# Patient Record
Sex: FEMALE | Race: BLACK OR AFRICAN AMERICAN | NOT HISPANIC OR LATINO | Employment: OTHER | URBAN - NONMETROPOLITAN AREA
[De-identification: names, ages, dates, MRNs, and addresses within clinical notes are randomized per-mention and may not be internally consistent; named-entity substitution may affect disease eponyms.]

---

## 2017-01-04 ENCOUNTER — HOSPITAL ENCOUNTER (OUTPATIENT)
Dept: OTHER | Facility: HOSPITAL | Age: 42
Setting detail: RADIATION/ONCOLOGY SERIES
Discharge: HOME | End: 2017-01-20
Attending: HOSPITALIST | Admitting: HOSPITALIST

## 2017-01-11 ENCOUNTER — OFFICE VISIT (OUTPATIENT)
Dept: FAMILY MEDICINE CLINIC | Facility: CLINIC | Age: 42
End: 2017-01-11

## 2017-01-11 VITALS
TEMPERATURE: 98.6 F | HEART RATE: 84 BPM | HEIGHT: 66 IN | DIASTOLIC BLOOD PRESSURE: 64 MMHG | BODY MASS INDEX: 43.87 KG/M2 | OXYGEN SATURATION: 97 % | SYSTOLIC BLOOD PRESSURE: 101 MMHG | WEIGHT: 273 LBS

## 2017-01-11 DIAGNOSIS — J06.9 ACUTE URI: ICD-10-CM

## 2017-01-11 DIAGNOSIS — R30.0 BURNING WITH URINATION: Primary | ICD-10-CM

## 2017-01-11 DIAGNOSIS — B37.0 CANDIDA, ORAL: ICD-10-CM

## 2017-01-11 DIAGNOSIS — R11.2 NAUSEA AND VOMITING, INTRACTABILITY OF VOMITING NOT SPECIFIED, UNSPECIFIED VOMITING TYPE: ICD-10-CM

## 2017-01-11 DIAGNOSIS — B37.31 CANDIDA VAGINITIS: ICD-10-CM

## 2017-01-11 LAB
BILIRUB BLD-MCNC: NORMAL MG/DL
CLARITY, POC: NORMAL
COLOR UR: NORMAL
GLUCOSE UR STRIP-MCNC: NEGATIVE MG/DL
KETONES UR QL: NORMAL
LEUKOCYTE EST, POC: NORMAL
NITRITE UR-MCNC: NEGATIVE MG/ML
PH UR: 6 [PH] (ref 5–8)
PROT UR STRIP-MCNC: NORMAL MG/DL
RBC # UR STRIP: NEGATIVE /UL
SP GR UR: 1.02 (ref 1–1.03)
UROBILINOGEN UR QL: NORMAL

## 2017-01-11 PROCEDURE — 81002 URINALYSIS NONAUTO W/O SCOPE: CPT | Performed by: NURSE PRACTITIONER

## 2017-01-11 PROCEDURE — 99214 OFFICE O/P EST MOD 30 MIN: CPT | Performed by: NURSE PRACTITIONER

## 2017-01-11 RX ORDER — ERGOCALCIFEROL 1.25 MG/1
50000 CAPSULE ORAL WEEKLY
COMMUNITY
End: 2019-02-25

## 2017-01-11 RX ORDER — CHOLECALCIFEROL (VITAMIN D3) 125 MCG
5 CAPSULE ORAL
COMMUNITY

## 2017-01-11 RX ORDER — AZITHROMYCIN 250 MG/1
TABLET, FILM COATED ORAL
Qty: 6 TABLET | Refills: 0 | Status: SHIPPED | OUTPATIENT
Start: 2017-01-11 | End: 2017-11-30

## 2017-01-11 RX ORDER — PROMETHAZINE HYDROCHLORIDE 25 MG/1
25 TABLET ORAL EVERY 6 HOURS PRN
Qty: 10 TABLET | Refills: 0 | Status: SHIPPED | OUTPATIENT
Start: 2017-01-11 | End: 2019-02-25

## 2017-01-11 RX ORDER — FLUCONAZOLE 150 MG/1
150 TABLET ORAL DAILY
Qty: 2 TABLET | Refills: 0 | Status: SHIPPED | OUTPATIENT
Start: 2017-01-11 | End: 2017-01-13

## 2017-01-11 NOTE — MR AVS SNAPSHOT
Jenn Good   1/11/2017 11:00 AM   Office Visit    Dept Phone:  [phone]   Encounter #:  [id number]    Provider:  JASON Vega   Department:  [facility] FAMILY [facility]IC[facility] [place]                Your Full Care Plan              Today's Medication Changes          These changes are accurate as of: 1/11/17 11:40 AM.  If you have any questions, ask your nurse or doctor.               New Medication(s)Ordered:     azithromycin 250 MG tablet   Commonly known as:  ZITHROMAX Z-TYSON   Take 2 tablets the first day, then 1 tablet daily for 4 days.   Started by:  JASON Vega       fluconazole 150 MG tablet   Commonly known as:  DIFLUCAN   Take 1 tablet by mouth Daily for 2 doses. One tab now repeat in 5 days   Started by:  JASON Vega       nystatin [id number] UNIT/ML suspension   Commonly known as:  MYCOSTATIN   Swish and swallow 5 mL 4 (Four) Times a Day for 7 days. Can swish and spit   Started by:  JASON Vega       promethazine 25 MG tablet   Commonly known as:  PHENERGAN   Take 1 tablet by mouth Every 6 (Six) Hours As Needed for nausea or vomiting.   Started by:  JASON Vega       terconazole 0.8 % vaginal cream   Commonly known as:  TERAZOL 3   Insert 1 applicator into the vagina Every Night for 3 days.   Started by:  JASON Vega            Where to Get Your Medications      These medications were sent to [facility] [address] [place] [address] [phone] [facility] [phone] [address] [address]     Phone:  [phone]     azithromycin 250 MG tablet    fluconazole 150 MG tablet    nystatin [id number] UNIT/ML suspension    promethazine 25 MG tablet    terconazole 0.8 % vaginal cream                  Your Updated Medication List          This list is accurate as of: 1/11/17 11:40 AM.  Always use your most recent med list.                amphetamine-dextroamphetamine XR  10 MG 24 hr capsule   Commonly known as:  ADDERALL XR       azithromycin 250 MG tablet   Commonly known as:  ZITHROMAX Z-TYSON   Take 2 tablets the first day, then 1 tablet daily for 4 days.       * baclofen 10 MG tablet   Commonly known as:  LIORESAL       * baclofen 20 MG tablet   Commonly known as:  LIORESAL       clotrimazole 1 % external solution   Commonly known as:  LOTRIMIN   Apply  topically 2 (Two) Times a Day.       diclofenac 75 MG EC tablet   Commonly known as:  VOLTAREN   Take 1 tablet by mouth 2 (Two) Times a Day.       diltiaZEM  MG 24 hr capsule   Commonly known as:  CARTIA XT   Take 1 capsule by mouth Daily.       doxepin 50 MG capsule   Commonly known as:  SINEquan       estradiol 2 MG tablet   Commonly known as:  ESTRACE   Take 1 tablet by mouth Daily.       fluconazole 150 MG tablet   Commonly known as:  DIFLUCAN   Take 1 tablet by mouth Daily for 2 doses. One tab now repeat in 5 days       melatonin 5 MG tablet tablet       miconazole 2 % vaginal cream   Commonly known as:  MICOTIN   Insert 1 applicator into the vagina Every Night.       natalizumab 300 MG/15ML injection   Commonly known as:  TYSABRI       nystatin [id number] UNIT/ML suspension   Commonly known as:  MYCOSTATIN   Swish and swallow 5 mL 4 (Four) Times a Day for 7 days. Can swish and spit       omeprazole 20 MG capsule   Commonly known as:  PRILOSEC   Take 1 capsule by mouth Daily.       pregabalin 100 MG capsule   Commonly known as:  LYRICA       promethazine 25 MG tablet   Commonly known as:  PHENERGAN   Take 1 tablet by mouth Every 6 (Six) Hours As Needed for nausea or vomiting.       SUMAtriptan 50 MG tablet   Commonly known as:  IMITREX       terconazole 0.8 % vaginal cream   Commonly known as:  TERAZOL 3   Insert 1 applicator into the vagina Every Night for 3 days.       tiZANidine 4 MG tablet   Commonly known as:  ZANAFLEX       traZODone 150 MG tablet   Commonly known as:  DESYREL   Take 1 tablet by mouth Every Night.        venlafaxine  MG 24 hr capsule   Commonly known as:  EFFEXOR-XR       vitamin D 18882 UNITS capsule capsule   Commonly known as:  ERGOCALCIFEROL       * Notice:  This list has 2 medication(s) that are the same as other medications prescribed for you. Read the directions carefully, and ask your doctor or other care provider to review them with you.            We Performed the Following     POC Urinalysis Dipstick     Urinalysis, Microscopic Only       You Were Diagnosed With        Codes Comments    Burning with urination    -  Primary ICD-10-CM: R30.0  ICD-9-CM: 788.1     Candida, oral     ICD-10-CM: B37.0  ICD-9-CM: 112.0     Candida vaginitis     ICD-10-CM: B37.3  ICD-9-CM: 112.1     Nausea and vomiting, intractability of vomiting not specified, unspecified vomiting type     ICD-10-CM: R11.2  ICD-9-CM: 787.01     Acute URI     ICD-10-CM: J06.9  ICD-9-CM: 465.9       Instructions     None    Patient Instructions History      Upcoming Appointments     Visit Type Date Time Department    SAME DAY 1/11/2017 11:00 AM [facility]    OFFICE VISIT 3/20/2017 10:40 AM [facility]      MyChart Signup     Our records indicate that you have declined [employer] signup. If you would like to sign up for [employer], please email [email or web address] or call [phone] to obtain an activation code.             Other Info from Your Visit           Your Appointments     Mar 20, 2017 10:40 AM CDT   Office Visit with JASON Curry   [facility] (--)    [address] [place] [address]  [facility] [address] [phone]           Arrive 15 minutes prior to appointment.              Allergies     Lisinopril  Cough      Reason for Visit     Urinary Tract Infection     Vomiting     Sinusitis     Headache           Vital Signs     Blood Pressure Pulse Temperature Height Weight Oxygen Saturation  "   101/64 84 98.6 °F (37 °C) 66\" (167.6 cm) 273 lb (124 kg) 97%    Body Mass Index Smoking Status                44.06 kg/m2 Never Smoker          Problems and Diagnoses Noted     Burning with urination    -  Primary    Candida, oral        Vaginal yeast infection        Nausea and vomiting        Acute upper respiratory infection            "

## 2017-01-11 NOTE — PROGRESS NOTES
Subjective   Jenn Good is a 41 y.o. female.     HPI Comments: She is here with sore mouth, N/V for over a week, sinus congestion and vag discomfort, feels like she might have a UTI.  Has had to take rounds of steroids for exacerbation of MS, still in process.      Urinary Tract Infection    Associated symptoms include nausea and vomiting.   Vomiting    This is a new problem. The current episode started 1 to 4 weeks ago. The problem occurs intermittently. The problem has been unchanged. The emesis has an appearance of bile and stomach contents. There has been no fever. Associated symptoms include abdominal pain, dizziness and headaches. Pertinent negatives include no chest pain or diarrhea. She has tried nothing for the symptoms. The treatment provided no relief.   Sinusitis   This is a new problem. The current episode started in the past 7 days. The problem has been gradually worsening since onset. There has been no fever. Her pain is at a severity of 0/10. The pain is mild. Associated symptoms include congestion, headaches, sinus pressure and a sore throat. Pertinent negatives include no neck pain or shortness of breath. (Mouth is sore with white patches) Past treatments include nothing. The treatment provided no relief.   Headache    Associated symptoms include abdominal pain, dizziness, nausea, sinus pressure, a sore throat and vomiting. Pertinent negatives include no neck pain.        The following portions of the patient's history were reviewed and updated as appropriate: allergies, current medications, past family history, past medical history, past social history, past surgical history and problem list.    Review of Systems   Constitutional: Positive for appetite change and fatigue.   HENT: Positive for congestion, mouth sores, postnasal drip, sinus pressure and sore throat.         Mouth is sore with white patches   Respiratory: Negative for chest tightness and shortness of breath.    Cardiovascular:  Negative for chest pain and palpitations.   Gastrointestinal: Positive for abdominal pain, nausea and vomiting. Negative for diarrhea.   Genitourinary: Positive for decreased urine volume and difficulty urinating.        Vaginal itching   Musculoskeletal: Negative for neck pain.   Skin: Negative.    Neurological: Positive for dizziness and headaches.       Objective   Physical Exam   Constitutional: She is oriented to person, place, and time. She appears well-developed and well-nourished.   HENT:   Head: Normocephalic and atraumatic.   Right Ear: Hearing, tympanic membrane, external ear and ear canal normal.   Left Ear: Hearing, tympanic membrane, external ear and ear canal normal.   Nose: Mucosal edema and rhinorrhea present. Right sinus exhibits maxillary sinus tenderness and frontal sinus tenderness. Left sinus exhibits maxillary sinus tenderness and frontal sinus tenderness.   Mouth/Throat: Uvula is midline. Mucous membranes are dry. Oropharyngeal exudate and posterior oropharyngeal erythema present.   Oral mucosa is very dry with white stuck on exudate tongue and throat   Eyes: Conjunctivae are normal.   Neck: Normal range of motion. Neck supple.   Cardiovascular: Normal rate and regular rhythm.    Pulmonary/Chest: Effort normal and breath sounds normal.   Abdominal: Soft. There is tenderness. There is no guarding.   Epigastric tenderness,    Genitourinary: Vaginal discharge found.   Genitourinary Comments: States noticed vag itching and a white discharge   Musculoskeletal: Normal range of motion.   Neurological: She is alert and oriented to person, place, and time.   Skin: Skin is warm and dry.   Psychiatric: She has a normal mood and affect.   Nursing note and vitals reviewed.      Assessment/Plan   Jenn was seen today for urinary tract infection, vomiting, sinusitis and headache.    Diagnoses and all orders for this visit:    Burning with urination  -     POC Urinalysis Dipstick  -     Urinalysis,  Microscopic Only    Candida, oral  -     nystatin (MYCOSTATIN) [id number] UNIT/ML suspension; Swish and swallow 5 mL 4 (Four) Times a Day for 7 days. Can swish and spit    Candida vaginitis  -     fluconazole (DIFLUCAN) 150 MG tablet; Take 1 tablet by mouth Daily for 2 doses. One tab now repeat in 5 days  -     terconazole (TERAZOL 3) 0.8 % vaginal cream; Insert 1 applicator into the vagina Every Night for 3 days.    Nausea and vomiting, intractability of vomiting not specified, unspecified vomiting type  -     promethazine (PHENERGAN) 25 MG tablet; Take 1 tablet by mouth Every 6 (Six) Hours As Needed for nausea or vomiting.    Acute URI  -     azithromycin (ZITHROMAX Z-TYSON) 250 MG tablet; Take 2 tablets the first day, then 1 tablet daily for 4 days.

## 2017-01-12 LAB
CONV BACTERIA IN URINE MICRO: ABNORMAL /HPF
CRYSTALS FLD MICRO: ABNORMAL /HPF
MUCOUS THREADS URNS QL MICRO: ABNORMAL
RBC #/AREA URNS HPF: ABNORMAL /HPF
SQUAMOUS SPT QL MICRO: ABNORMAL /HPF
WBC #/AREA URNS HPF: ABNORMAL /HPF

## 2017-02-11 PROBLEM — G35 MULTIPLE SCLEROSIS (HCC): Status: ACTIVE | Noted: 2017-02-11

## 2017-02-11 RX ORDER — ACETAMINOPHEN 500 MG
1000 TABLET ORAL AS NEEDED
Status: CANCELLED | OUTPATIENT
Start: 2017-02-13

## 2017-02-11 RX ORDER — ONDANSETRON 2 MG/ML
4 INJECTION INTRAMUSCULAR; INTRAVENOUS AS NEEDED
Status: CANCELLED
Start: 2017-02-13

## 2017-02-11 RX ORDER — SODIUM CHLORIDE 9 MG/ML
250 INJECTION, SOLUTION INTRAVENOUS ONCE
Status: CANCELLED | OUTPATIENT
Start: 2017-02-13

## 2017-02-11 RX ORDER — DIPHENHYDRAMINE HYDROCHLORIDE 50 MG/ML
25 INJECTION INTRAMUSCULAR; INTRAVENOUS AS NEEDED
Status: CANCELLED
Start: 2017-02-13

## 2017-02-13 ENCOUNTER — INFUSION (OUTPATIENT)
Dept: ONCOLOGY | Facility: HOSPITAL | Age: 42
End: 2017-02-13

## 2017-02-13 VITALS
SYSTOLIC BLOOD PRESSURE: 107 MMHG | RESPIRATION RATE: 20 BRPM | OXYGEN SATURATION: 99 % | TEMPERATURE: 97.1 F | DIASTOLIC BLOOD PRESSURE: 65 MMHG | HEART RATE: 73 BPM

## 2017-02-13 DIAGNOSIS — G35 MULTIPLE SCLEROSIS (HCC): Primary | ICD-10-CM

## 2017-02-13 LAB
ALBUMIN SERPL-MCNC: 3.5 G/DL (ref 3.4–4.8)
ALP SERPL-CCNC: 83 U/L (ref 38–126)
ALT SERPL W P-5'-P-CCNC: 16 U/L (ref 9–52)
AST SERPL-CCNC: 18 U/L (ref 14–36)
BILIRUB CONJ SERPL-MCNC: 0 MG/DL (ref 0–0.3)
BILIRUB INDIRECT SERPL-MCNC: 0 MG/DL (ref 0–1.1)
BILIRUB SERPL-MCNC: 0.3 MG/DL (ref 0.2–1.3)
PROT SERPL-MCNC: 6.1 G/DL (ref 6.3–8.6)

## 2017-02-13 PROCEDURE — 25010000002 NATALIZUMAB PER 1 MG: Performed by: PSYCHIATRY & NEUROLOGY

## 2017-02-13 PROCEDURE — 96413 CHEMO IV INFUSION 1 HR: CPT | Performed by: HOSPITALIST

## 2017-02-13 PROCEDURE — 36415 COLL VENOUS BLD VENIPUNCTURE: CPT | Performed by: HOSPITALIST

## 2017-02-13 RX ORDER — SODIUM CHLORIDE 9 MG/ML
250 INJECTION, SOLUTION INTRAVENOUS ONCE
Status: CANCELLED | OUTPATIENT
Start: 2017-03-13

## 2017-02-13 RX ORDER — DIPHENHYDRAMINE HYDROCHLORIDE 50 MG/ML
25 INJECTION INTRAMUSCULAR; INTRAVENOUS AS NEEDED
Status: CANCELLED
Start: 2017-02-13

## 2017-02-13 RX ORDER — DIPHENHYDRAMINE HYDROCHLORIDE 50 MG/ML
25 INJECTION INTRAMUSCULAR; INTRAVENOUS AS NEEDED
Start: 2018-02-01

## 2017-02-13 RX ORDER — ONDANSETRON 2 MG/ML
4 INJECTION INTRAMUSCULAR; INTRAVENOUS AS NEEDED
Status: CANCELLED
Start: 2017-02-13

## 2017-02-13 RX ORDER — ACETAMINOPHEN 500 MG
1000 TABLET ORAL AS NEEDED
OUTPATIENT
Start: 2018-02-01

## 2017-02-13 RX ORDER — ACETAMINOPHEN 500 MG
1000 TABLET ORAL AS NEEDED
Status: CANCELLED | OUTPATIENT
Start: 2017-02-13

## 2017-02-13 RX ORDER — ONDANSETRON 2 MG/ML
4 INJECTION INTRAMUSCULAR; INTRAVENOUS AS NEEDED
Start: 2018-02-01

## 2017-02-13 RX ORDER — SODIUM CHLORIDE 0.9 % (FLUSH) 0.9 %
SYRINGE (ML) INJECTION
Status: COMPLETED
Start: 2017-02-13 | End: 2017-02-13

## 2017-02-13 RX ORDER — SODIUM CHLORIDE 9 MG/ML
250 INJECTION, SOLUTION INTRAVENOUS ONCE
Status: COMPLETED | OUTPATIENT
Start: 2017-02-13 | End: 2017-02-13

## 2017-02-13 RX ADMIN — Medication 10 ML: at 09:30

## 2017-02-13 RX ADMIN — NATALIZUMAB 300 MG: 300 INJECTION INTRAVENOUS at 10:07

## 2017-02-13 RX ADMIN — SODIUM CHLORIDE 250 ML: 9 INJECTION, SOLUTION INTRAVENOUS at 09:38

## 2017-03-13 ENCOUNTER — INFUSION (OUTPATIENT)
Dept: ONCOLOGY | Facility: HOSPITAL | Age: 42
End: 2017-03-13

## 2017-03-13 VITALS
DIASTOLIC BLOOD PRESSURE: 89 MMHG | TEMPERATURE: 97.2 F | SYSTOLIC BLOOD PRESSURE: 155 MMHG | RESPIRATION RATE: 18 BRPM | HEART RATE: 86 BPM

## 2017-03-13 DIAGNOSIS — G35 MULTIPLE SCLEROSIS (HCC): Primary | ICD-10-CM

## 2017-03-13 PROCEDURE — 25010000002 NATALIZUMAB PER 1 MG: Performed by: PSYCHIATRY & NEUROLOGY

## 2017-03-13 PROCEDURE — 96413 CHEMO IV INFUSION 1 HR: CPT | Performed by: HOSPITALIST

## 2017-03-13 RX ORDER — ONDANSETRON 2 MG/ML
4 INJECTION INTRAMUSCULAR; INTRAVENOUS AS NEEDED
Status: CANCELLED
Start: 2017-04-10

## 2017-03-13 RX ORDER — SODIUM CHLORIDE 9 MG/ML
250 INJECTION, SOLUTION INTRAVENOUS ONCE
Status: CANCELLED | OUTPATIENT
Start: 2017-04-10

## 2017-03-13 RX ORDER — SODIUM CHLORIDE 9 MG/ML
250 INJECTION, SOLUTION INTRAVENOUS ONCE
Status: COMPLETED | OUTPATIENT
Start: 2017-03-13 | End: 2017-03-13

## 2017-03-13 RX ORDER — ACETAMINOPHEN 500 MG
1000 TABLET ORAL AS NEEDED
Status: CANCELLED
Start: 2017-04-10

## 2017-03-13 RX ORDER — DIPHENHYDRAMINE HYDROCHLORIDE 50 MG/ML
25 INJECTION INTRAMUSCULAR; INTRAVENOUS ONCE
Status: CANCELLED
Start: 2017-04-10 | End: 2017-04-10

## 2017-03-13 RX ADMIN — SODIUM CHLORIDE 250 ML: 9 INJECTION, SOLUTION INTRAVENOUS at 09:28

## 2017-03-13 RX ADMIN — NATALIZUMAB 300 MG: 300 INJECTION INTRAVENOUS at 09:45

## 2017-03-31 ENCOUNTER — OFFICE VISIT (OUTPATIENT)
Dept: FAMILY MEDICINE CLINIC | Facility: CLINIC | Age: 42
End: 2017-03-31

## 2017-03-31 VITALS
HEIGHT: 66 IN | OXYGEN SATURATION: 97 % | TEMPERATURE: 98 F | RESPIRATION RATE: 16 BRPM | WEIGHT: 262.4 LBS | HEART RATE: 79 BPM | DIASTOLIC BLOOD PRESSURE: 90 MMHG | SYSTOLIC BLOOD PRESSURE: 144 MMHG | BODY MASS INDEX: 42.17 KG/M2

## 2017-03-31 DIAGNOSIS — M54.41 LOW BACK PAIN WITH RIGHT-SIDED SCIATICA, UNSPECIFIED BACK PAIN LATERALITY, UNSPECIFIED CHRONICITY: Primary | ICD-10-CM

## 2017-03-31 PROCEDURE — 99213 OFFICE O/P EST LOW 20 MIN: CPT | Performed by: NURSE PRACTITIONER

## 2017-03-31 NOTE — PROGRESS NOTES
Subjective   Jenn Good is a 41 y.o. female.     HPI Comments: She cont to have back pain that runs into her right leg.  Has had physical therapy about 3 or 4 months ago and did not help.  Has seen the chiropractor and the benefits have been minimal.     Back Pain   This is a chronic problem. The current episode started more than 1 year ago. The problem occurs constantly. The problem has been waxing and waning since onset. The pain is present in the lumbar spine. The pain radiates to the right thigh. The pain is at a severity of 7/10. The pain is moderate. The pain is the same all the time. The symptoms are aggravated by standing. Stiffness is present in the morning. Associated symptoms include leg pain (right leg). Pertinent negatives include no abdominal pain, bladder incontinence, bowel incontinence, chest pain, dysuria, fever, headaches, numbness, paresis, paresthesias, pelvic pain, perianal numbness, tingling, weakness or weight loss. Risk factors include obesity. She has tried chiropractic manipulation and muscle relaxant for the symptoms. The treatment provided mild relief.        The following portions of the patient's history were reviewed and updated as appropriate: allergies, current medications, past family history, past medical history, past social history, past surgical history and problem list.    Review of Systems   Constitutional: Negative.  Negative for fever and weight loss.   HENT: Negative.    Respiratory: Negative.    Cardiovascular: Negative.  Negative for chest pain.   Gastrointestinal: Negative for abdominal pain and bowel incontinence.   Genitourinary: Negative for bladder incontinence, dysuria and pelvic pain.   Musculoskeletal: Positive for back pain.   Neurological: Negative.  Negative for tingling, weakness, numbness, headaches and paresthesias.   Psychiatric/Behavioral: Negative.        Objective   Physical Exam   Constitutional: She is oriented to person, place, and time. She  appears well-developed and well-nourished. No distress.   HENT:   Head: Normocephalic.   Eyes: Pupils are equal, round, and reactive to light.   Neck: Normal range of motion. Neck supple. No thyromegaly present.   Cardiovascular: Normal rate, regular rhythm and normal heart sounds.  Exam reveals no friction rub.    No murmur heard.  Pulmonary/Chest: Effort normal and breath sounds normal. No respiratory distress. She has no wheezes. She has no rales.   Abdominal: Soft.   Musculoskeletal:        Lumbar back: She exhibits decreased range of motion, pain and spasm.   Prev xray of lumbar spine is reviewed.  Minimal disc space narrowing is noted.   Neurological: She is alert and oriented to person, place, and time.   Skin: Skin is warm and dry.   Psychiatric: She has a normal mood and affect. Thought content normal.   Nursing note and vitals reviewed.      Assessment/Plan   Jenn was seen today for follow-up.    Diagnoses and all orders for this visit:    Low back pain with right-sided sciatica, unspecified back pain laterality, unspecified chronicity      She has had physical therapy and has not seen significant improvement.  Will try to get mri approved.  No other changes in meds at this time.

## 2017-04-10 ENCOUNTER — APPOINTMENT (OUTPATIENT)
Dept: ONCOLOGY | Facility: HOSPITAL | Age: 42
End: 2017-04-10

## 2017-04-11 ENCOUNTER — INFUSION (OUTPATIENT)
Dept: ONCOLOGY | Facility: HOSPITAL | Age: 42
End: 2017-04-11

## 2017-04-11 VITALS
RESPIRATION RATE: 16 BRPM | DIASTOLIC BLOOD PRESSURE: 64 MMHG | WEIGHT: 261.7 LBS | SYSTOLIC BLOOD PRESSURE: 128 MMHG | TEMPERATURE: 98 F | BODY MASS INDEX: 42.24 KG/M2 | HEART RATE: 84 BPM

## 2017-04-11 DIAGNOSIS — E67.3 HYPERVITAMINOSIS D: ICD-10-CM

## 2017-04-11 DIAGNOSIS — R21 RASH: ICD-10-CM

## 2017-04-11 DIAGNOSIS — K21.9 GASTROESOPHAGEAL REFLUX DISEASE WITHOUT ESOPHAGITIS: ICD-10-CM

## 2017-04-11 DIAGNOSIS — G35 MULTIPLE SCLEROSIS (HCC): Primary | ICD-10-CM

## 2017-04-11 DIAGNOSIS — Z79.899 DRUG THERAPY: ICD-10-CM

## 2017-04-11 DIAGNOSIS — Z79.890 HORMONE REPLACEMENT THERAPY: ICD-10-CM

## 2017-04-11 DIAGNOSIS — I10 ESSENTIAL HYPERTENSION: ICD-10-CM

## 2017-04-11 LAB
25(OH)D3 SERPL-MCNC: 55.2 NG/ML (ref 30–100)
ALBUMIN SERPL-MCNC: 3.6 G/DL (ref 3.4–4.8)
ALP SERPL-CCNC: 126 U/L (ref 38–126)
ALT SERPL W P-5'-P-CCNC: 23 U/L (ref 9–52)
AST SERPL-CCNC: 27 U/L (ref 14–36)
BASOPHILS # BLD AUTO: 0.04 10*3/MM3 (ref 0–0.2)
BASOPHILS NFR BLD AUTO: 0.4 % (ref 0–2)
BILIRUB CONJ SERPL-MCNC: 0 MG/DL (ref 0–0.3)
BILIRUB INDIRECT SERPL-MCNC: 0 MG/DL (ref 0–1.1)
BILIRUB SERPL-MCNC: 0.4 MG/DL (ref 0.2–1.3)
DEPRECATED RDW RBC AUTO: 46.2 FL (ref 36.4–46.3)
EOSINOPHIL # BLD AUTO: 0.24 10*3/MM3 (ref 0–0.7)
EOSINOPHIL NFR BLD AUTO: 2.2 % (ref 0–7)
ERYTHROCYTE [DISTWIDTH] IN BLOOD BY AUTOMATED COUNT: 16.8 % (ref 11.5–14.5)
HCT VFR BLD AUTO: 29.9 % (ref 35–45)
HGB BLD-MCNC: 9.6 G/DL (ref 12–15.5)
IMM GRANULOCYTES # BLD: 0.06 10*3/MM3 (ref 0–0.02)
IMM GRANULOCYTES NFR BLD: 0.5 % (ref 0–0.5)
LYMPHOCYTES # BLD AUTO: 3.52 10*3/MM3 (ref 0.6–4.2)
LYMPHOCYTES NFR BLD AUTO: 32.1 % (ref 10–50)
MCH RBC QN AUTO: 23.9 PG (ref 26.5–34)
MCHC RBC AUTO-ENTMCNC: 32.1 G/DL (ref 31.4–36)
MCV RBC AUTO: 74.4 FL (ref 80–98)
MONOCYTES # BLD AUTO: 0.58 10*3/MM3 (ref 0–0.9)
MONOCYTES NFR BLD AUTO: 5.3 % (ref 0–12)
NEUTROPHILS # BLD AUTO: 6.51 10*3/MM3 (ref 2–8.6)
NEUTROPHILS NFR BLD AUTO: 59.5 % (ref 37–80)
PLATELET # BLD AUTO: 295 10*3/MM3 (ref 150–450)
PMV BLD AUTO: 10 FL (ref 8–12)
PROT SERPL-MCNC: 6.2 G/DL (ref 6.3–8.6)
RBC # BLD AUTO: 4.02 10*6/MM3 (ref 3.77–5.16)
WBC NRBC COR # BLD: 10.95 10*3/MM3 (ref 3.2–9.8)

## 2017-04-11 PROCEDURE — 82306 VITAMIN D 25 HYDROXY: CPT | Performed by: HOSPITALIST

## 2017-04-11 PROCEDURE — 80076 HEPATIC FUNCTION PANEL: CPT | Performed by: HOSPITALIST

## 2017-04-11 PROCEDURE — 36415 COLL VENOUS BLD VENIPUNCTURE: CPT | Performed by: INTERNAL MEDICINE

## 2017-04-11 PROCEDURE — 96365 THER/PROPH/DIAG IV INF INIT: CPT | Performed by: INTERNAL MEDICINE

## 2017-04-11 PROCEDURE — 86711 JOHN CUNNINGHAM ANTIBODY: CPT | Performed by: HOSPITALIST

## 2017-04-11 PROCEDURE — 36415 COLL VENOUS BLD VENIPUNCTURE: CPT | Performed by: HOSPITALIST

## 2017-04-11 PROCEDURE — 25010000002 NATALIZUMAB PER 1 MG: Performed by: PSYCHIATRY & NEUROLOGY

## 2017-04-11 PROCEDURE — 85025 COMPLETE CBC W/AUTO DIFF WBC: CPT | Performed by: HOSPITALIST

## 2017-04-11 RX ORDER — DIPHENHYDRAMINE HYDROCHLORIDE 50 MG/ML
25 INJECTION INTRAMUSCULAR; INTRAVENOUS ONCE
Status: CANCELLED
Start: 2017-05-08 | End: 2017-05-08

## 2017-04-11 RX ORDER — ONDANSETRON 2 MG/ML
4 INJECTION INTRAMUSCULAR; INTRAVENOUS AS NEEDED
Status: CANCELLED
Start: 2017-05-08

## 2017-04-11 RX ORDER — SODIUM CHLORIDE 9 MG/ML
250 INJECTION, SOLUTION INTRAVENOUS ONCE
Status: COMPLETED | OUTPATIENT
Start: 2017-04-11 | End: 2017-04-11

## 2017-04-11 RX ORDER — ACETAMINOPHEN 500 MG
1000 TABLET ORAL AS NEEDED
Status: CANCELLED
Start: 2017-05-08

## 2017-04-11 RX ORDER — SODIUM CHLORIDE 9 MG/ML
250 INJECTION, SOLUTION INTRAVENOUS ONCE
Status: CANCELLED | OUTPATIENT
Start: 2017-05-08

## 2017-04-11 RX ADMIN — NATALIZUMAB 300 MG: 300 INJECTION INTRAVENOUS at 15:05

## 2017-04-11 RX ADMIN — SODIUM CHLORIDE 250 ML: 9 INJECTION, SOLUTION INTRAVENOUS at 14:35

## 2017-04-26 LAB — REF LAB TEST RESULTS: NORMAL

## 2017-05-02 ENCOUNTER — OFFICE VISIT (OUTPATIENT)
Dept: FAMILY MEDICINE CLINIC | Facility: CLINIC | Age: 42
End: 2017-05-02

## 2017-05-02 VITALS
HEIGHT: 66 IN | WEIGHT: 253.6 LBS | DIASTOLIC BLOOD PRESSURE: 70 MMHG | SYSTOLIC BLOOD PRESSURE: 128 MMHG | OXYGEN SATURATION: 97 % | HEART RATE: 91 BPM | RESPIRATION RATE: 16 BRPM | BODY MASS INDEX: 40.76 KG/M2 | TEMPERATURE: 96.5 F

## 2017-05-02 DIAGNOSIS — M54.5 CHRONIC LOW BACK PAIN, UNSPECIFIED BACK PAIN LATERALITY, WITH SCIATICA PRESENCE UNSPECIFIED: Primary | ICD-10-CM

## 2017-05-02 DIAGNOSIS — H61.21 IMPACTED CERUMEN OF RIGHT EAR: ICD-10-CM

## 2017-05-02 DIAGNOSIS — G89.29 CHRONIC LOW BACK PAIN, UNSPECIFIED BACK PAIN LATERALITY, WITH SCIATICA PRESENCE UNSPECIFIED: Primary | ICD-10-CM

## 2017-05-02 PROBLEM — M54.50 CHRONIC LOW BACK PAIN: Status: ACTIVE | Noted: 2017-05-02

## 2017-05-02 PROCEDURE — 99213 OFFICE O/P EST LOW 20 MIN: CPT | Performed by: NURSE PRACTITIONER

## 2017-05-02 PROCEDURE — 96372 THER/PROPH/DIAG INJ SC/IM: CPT | Performed by: NURSE PRACTITIONER

## 2017-05-02 RX ORDER — BETAMETHASONE SODIUM PHOSPHATE AND BETAMETHASONE ACETATE 3; 3 MG/ML; MG/ML
12 INJECTION, SUSPENSION INTRA-ARTICULAR; INTRALESIONAL; INTRAMUSCULAR; SOFT TISSUE EVERY 24 HOURS
Status: DISCONTINUED | OUTPATIENT
Start: 2017-05-02 | End: 2017-05-02

## 2017-05-02 RX ORDER — BETAMETHASONE SODIUM PHOSPHATE AND BETAMETHASONE ACETATE 3; 3 MG/ML; MG/ML
12 INJECTION, SUSPENSION INTRA-ARTICULAR; INTRALESIONAL; INTRAMUSCULAR; SOFT TISSUE EVERY 24 HOURS
Status: SHIPPED | OUTPATIENT
Start: 2017-05-02 | End: 2017-05-04

## 2017-05-02 RX ADMIN — BETAMETHASONE SODIUM PHOSPHATE AND BETAMETHASONE ACETATE 12 MG: 3; 3 INJECTION, SUSPENSION INTRA-ARTICULAR; INTRALESIONAL; INTRAMUSCULAR; SOFT TISSUE at 12:06

## 2017-05-09 ENCOUNTER — INFUSION (OUTPATIENT)
Dept: ONCOLOGY | Facility: HOSPITAL | Age: 42
End: 2017-05-09

## 2017-05-09 VITALS
RESPIRATION RATE: 18 BRPM | HEART RATE: 100 BPM | DIASTOLIC BLOOD PRESSURE: 61 MMHG | TEMPERATURE: 96.5 F | SYSTOLIC BLOOD PRESSURE: 111 MMHG

## 2017-05-09 DIAGNOSIS — G35 MULTIPLE SCLEROSIS (HCC): Primary | ICD-10-CM

## 2017-05-09 PROCEDURE — 25010000002 NATALIZUMAB PER 1 MG: Performed by: PSYCHIATRY & NEUROLOGY

## 2017-05-09 PROCEDURE — 96365 THER/PROPH/DIAG IV INF INIT: CPT | Performed by: HOSPITALIST

## 2017-05-09 RX ORDER — DIPHENHYDRAMINE HYDROCHLORIDE 50 MG/ML
25 INJECTION INTRAMUSCULAR; INTRAVENOUS ONCE
Status: CANCELLED
Start: 2017-06-06 | End: 2017-06-06

## 2017-05-09 RX ORDER — ONDANSETRON 2 MG/ML
4 INJECTION INTRAMUSCULAR; INTRAVENOUS AS NEEDED
Status: CANCELLED
Start: 2017-06-06

## 2017-05-09 RX ORDER — SODIUM CHLORIDE 9 MG/ML
250 INJECTION, SOLUTION INTRAVENOUS ONCE
Status: COMPLETED | OUTPATIENT
Start: 2017-05-09 | End: 2017-05-09

## 2017-05-09 RX ORDER — SODIUM CHLORIDE 9 MG/ML
250 INJECTION, SOLUTION INTRAVENOUS ONCE
Status: CANCELLED | OUTPATIENT
Start: 2017-06-06

## 2017-05-09 RX ORDER — ACETAMINOPHEN 500 MG
1000 TABLET ORAL AS NEEDED
Status: CANCELLED
Start: 2017-06-06

## 2017-05-09 RX ADMIN — SODIUM CHLORIDE 250 ML: 9 INJECTION, SOLUTION INTRAVENOUS at 15:04

## 2017-05-09 RX ADMIN — NATALIZUMAB 300 MG: 300 INJECTION INTRAVENOUS at 15:16

## 2017-06-06 ENCOUNTER — INFUSION (OUTPATIENT)
Dept: ONCOLOGY | Facility: HOSPITAL | Age: 42
End: 2017-06-06

## 2017-06-06 VITALS
HEART RATE: 86 BPM | DIASTOLIC BLOOD PRESSURE: 97 MMHG | SYSTOLIC BLOOD PRESSURE: 154 MMHG | TEMPERATURE: 97 F | RESPIRATION RATE: 18 BRPM

## 2017-06-06 DIAGNOSIS — G35 MULTIPLE SCLEROSIS (HCC): Primary | ICD-10-CM

## 2017-06-06 PROCEDURE — 96365 THER/PROPH/DIAG IV INF INIT: CPT | Performed by: HOSPITALIST

## 2017-06-06 PROCEDURE — 25010000002 NATALIZUMAB PER 1 MG: Performed by: PSYCHIATRY & NEUROLOGY

## 2017-06-06 RX ORDER — SODIUM CHLORIDE 9 MG/ML
250 INJECTION, SOLUTION INTRAVENOUS ONCE
Status: COMPLETED | OUTPATIENT
Start: 2017-06-06 | End: 2017-06-06

## 2017-06-06 RX ORDER — DIPHENHYDRAMINE HYDROCHLORIDE 50 MG/ML
25 INJECTION INTRAMUSCULAR; INTRAVENOUS ONCE
Status: CANCELLED
Start: 2017-06-06 | End: 2017-06-06

## 2017-06-06 RX ORDER — ACETAMINOPHEN 500 MG
1000 TABLET ORAL AS NEEDED
Status: CANCELLED
Start: 2017-06-06

## 2017-06-06 RX ORDER — ONDANSETRON 2 MG/ML
4 INJECTION INTRAMUSCULAR; INTRAVENOUS AS NEEDED
Status: CANCELLED
Start: 2017-06-06

## 2017-06-06 RX ORDER — SODIUM CHLORIDE 9 MG/ML
250 INJECTION, SOLUTION INTRAVENOUS ONCE
Status: CANCELLED | OUTPATIENT
Start: 2017-06-06

## 2017-06-06 RX ADMIN — NATALIZUMAB 300 MG: 300 INJECTION INTRAVENOUS at 14:08

## 2017-06-06 RX ADMIN — SODIUM CHLORIDE 250 ML: 9 INJECTION, SOLUTION INTRAVENOUS at 13:40

## 2017-06-21 RX ORDER — ACETAMINOPHEN 500 MG
1000 TABLET ORAL AS NEEDED
Status: CANCELLED
Start: 2017-07-04

## 2017-06-21 RX ORDER — SODIUM CHLORIDE 9 MG/ML
250 INJECTION, SOLUTION INTRAVENOUS ONCE
Status: CANCELLED | OUTPATIENT
Start: 2017-07-04

## 2017-06-21 RX ORDER — ONDANSETRON 2 MG/ML
4 INJECTION INTRAMUSCULAR; INTRAVENOUS AS NEEDED
Status: CANCELLED
Start: 2017-07-04

## 2017-06-21 RX ORDER — DIPHENHYDRAMINE HYDROCHLORIDE 50 MG/ML
25 INJECTION INTRAMUSCULAR; INTRAVENOUS ONCE
Status: CANCELLED
Start: 2017-07-04 | End: 2017-07-04

## 2017-07-06 ENCOUNTER — INFUSION (OUTPATIENT)
Dept: ONCOLOGY | Facility: HOSPITAL | Age: 42
End: 2017-07-06

## 2017-07-06 VITALS — SYSTOLIC BLOOD PRESSURE: 143 MMHG | DIASTOLIC BLOOD PRESSURE: 88 MMHG | HEART RATE: 86 BPM | TEMPERATURE: 97.1 F

## 2017-07-06 DIAGNOSIS — G35 MULTIPLE SCLEROSIS (HCC): Primary | ICD-10-CM

## 2017-07-06 PROCEDURE — 25010000002 NATALIZUMAB PER 1 MG: Performed by: PSYCHIATRY & NEUROLOGY

## 2017-07-06 PROCEDURE — 96365 THER/PROPH/DIAG IV INF INIT: CPT | Performed by: INTERNAL MEDICINE

## 2017-07-06 RX ORDER — SODIUM CHLORIDE 9 MG/ML
250 INJECTION, SOLUTION INTRAVENOUS ONCE
Status: CANCELLED | OUTPATIENT
Start: 2017-07-06

## 2017-07-06 RX ORDER — ONDANSETRON 2 MG/ML
4 INJECTION INTRAMUSCULAR; INTRAVENOUS AS NEEDED
Status: CANCELLED
Start: 2017-07-06

## 2017-07-06 RX ORDER — SODIUM CHLORIDE 9 MG/ML
250 INJECTION, SOLUTION INTRAVENOUS ONCE
Status: COMPLETED | OUTPATIENT
Start: 2017-07-06 | End: 2017-07-06

## 2017-07-06 RX ORDER — ACETAMINOPHEN 500 MG
1000 TABLET ORAL AS NEEDED
Status: CANCELLED
Start: 2017-07-06

## 2017-07-06 RX ORDER — DIPHENHYDRAMINE HYDROCHLORIDE 50 MG/ML
25 INJECTION INTRAMUSCULAR; INTRAVENOUS ONCE
Status: CANCELLED
Start: 2017-07-06 | End: 2017-07-06

## 2017-07-06 RX ADMIN — SODIUM CHLORIDE 250 ML: 9 INJECTION, SOLUTION INTRAVENOUS at 14:54

## 2017-07-06 RX ADMIN — NATALIZUMAB 300 MG: 300 INJECTION INTRAVENOUS at 14:54

## 2017-08-03 ENCOUNTER — INFUSION (OUTPATIENT)
Dept: ONCOLOGY | Facility: HOSPITAL | Age: 42
End: 2017-08-03

## 2017-08-03 VITALS
SYSTOLIC BLOOD PRESSURE: 176 MMHG | DIASTOLIC BLOOD PRESSURE: 86 MMHG | RESPIRATION RATE: 20 BRPM | HEART RATE: 80 BPM | TEMPERATURE: 98 F

## 2017-08-03 DIAGNOSIS — G35 MULTIPLE SCLEROSIS (HCC): Primary | ICD-10-CM

## 2017-08-03 LAB
ALBUMIN SERPL-MCNC: 3.9 G/DL (ref 3.4–4.8)
ALP SERPL-CCNC: 147 U/L (ref 38–126)
ALT SERPL W P-5'-P-CCNC: 52 U/L (ref 9–52)
AST SERPL-CCNC: 41 U/L (ref 14–36)
BILIRUB CONJ SERPL-MCNC: 0 MG/DL (ref 0–0.3)
BILIRUB INDIRECT SERPL-MCNC: 0.2 MG/DL (ref 0–1.1)
BILIRUB SERPL-MCNC: 0.3 MG/DL (ref 0.2–1.3)
PROT SERPL-MCNC: 6.8 G/DL (ref 6.3–8.6)

## 2017-08-03 PROCEDURE — 80076 HEPATIC FUNCTION PANEL: CPT

## 2017-08-03 PROCEDURE — 36415 COLL VENOUS BLD VENIPUNCTURE: CPT | Performed by: INTERNAL MEDICINE

## 2017-08-03 PROCEDURE — 25010000002 NATALIZUMAB PER 1 MG: Performed by: PSYCHIATRY & NEUROLOGY

## 2017-08-03 PROCEDURE — 96365 THER/PROPH/DIAG IV INF INIT: CPT | Performed by: INTERNAL MEDICINE

## 2017-08-03 RX ORDER — ONDANSETRON 2 MG/ML
4 INJECTION INTRAMUSCULAR; INTRAVENOUS AS NEEDED
Status: CANCELLED
Start: 2017-08-03

## 2017-08-03 RX ORDER — SODIUM CHLORIDE 9 MG/ML
250 INJECTION, SOLUTION INTRAVENOUS ONCE
Status: CANCELLED | OUTPATIENT
Start: 2017-08-03

## 2017-08-03 RX ORDER — DIPHENHYDRAMINE HYDROCHLORIDE 50 MG/ML
25 INJECTION INTRAMUSCULAR; INTRAVENOUS ONCE
Status: CANCELLED
Start: 2017-08-03 | End: 2017-08-03

## 2017-08-03 RX ORDER — ACETAMINOPHEN 500 MG
1000 TABLET ORAL AS NEEDED
Status: CANCELLED
Start: 2017-08-03

## 2017-08-03 RX ORDER — SODIUM CHLORIDE 9 MG/ML
250 INJECTION, SOLUTION INTRAVENOUS ONCE
Status: COMPLETED | OUTPATIENT
Start: 2017-08-03 | End: 2017-08-03

## 2017-08-03 RX ADMIN — NATALIZUMAB 300 MG: 300 INJECTION INTRAVENOUS at 10:12

## 2017-08-03 RX ADMIN — SODIUM CHLORIDE 250 ML: 9 INJECTION, SOLUTION INTRAVENOUS at 10:12

## 2017-08-31 ENCOUNTER — INFUSION (OUTPATIENT)
Dept: ONCOLOGY | Facility: HOSPITAL | Age: 42
End: 2017-08-31

## 2017-08-31 VITALS
RESPIRATION RATE: 16 BRPM | DIASTOLIC BLOOD PRESSURE: 80 MMHG | SYSTOLIC BLOOD PRESSURE: 151 MMHG | HEART RATE: 93 BPM | TEMPERATURE: 97.6 F

## 2017-08-31 DIAGNOSIS — G35 MULTIPLE SCLEROSIS (HCC): Primary | ICD-10-CM

## 2017-08-31 PROCEDURE — 96365 THER/PROPH/DIAG IV INF INIT: CPT | Performed by: INTERNAL MEDICINE

## 2017-08-31 PROCEDURE — 25010000002 NATALIZUMAB PER 1 MG: Performed by: PSYCHIATRY & NEUROLOGY

## 2017-08-31 RX ORDER — ONDANSETRON 2 MG/ML
4 INJECTION INTRAMUSCULAR; INTRAVENOUS AS NEEDED
Status: CANCELLED
Start: 2017-08-31

## 2017-08-31 RX ORDER — DIPHENHYDRAMINE HYDROCHLORIDE 50 MG/ML
25 INJECTION INTRAMUSCULAR; INTRAVENOUS ONCE
Status: CANCELLED
Start: 2017-08-31 | End: 2017-08-31

## 2017-08-31 RX ORDER — SODIUM CHLORIDE 9 MG/ML
250 INJECTION, SOLUTION INTRAVENOUS ONCE
Status: COMPLETED | OUTPATIENT
Start: 2017-08-31 | End: 2017-08-31

## 2017-08-31 RX ORDER — SODIUM CHLORIDE 9 MG/ML
250 INJECTION, SOLUTION INTRAVENOUS ONCE
Status: CANCELLED | OUTPATIENT
Start: 2017-08-31

## 2017-08-31 RX ORDER — ACETAMINOPHEN 500 MG
1000 TABLET ORAL AS NEEDED
Status: CANCELLED
Start: 2017-08-31

## 2017-08-31 RX ADMIN — SODIUM CHLORIDE 250 ML: 9 INJECTION, SOLUTION INTRAVENOUS at 10:15

## 2017-08-31 RX ADMIN — NATALIZUMAB 300 MG: 300 INJECTION INTRAVENOUS at 10:38

## 2017-09-19 DIAGNOSIS — I10 ESSENTIAL HYPERTENSION: ICD-10-CM

## 2017-09-19 DIAGNOSIS — Z79.890 HORMONE REPLACEMENT THERAPY: ICD-10-CM

## 2017-09-20 RX ORDER — DILTIAZEM HYDROCHLORIDE 240 MG/1
CAPSULE, EXTENDED RELEASE ORAL
Qty: 30 CAPSULE | Refills: 2 | Status: SHIPPED | OUTPATIENT
Start: 2017-09-20 | End: 2020-08-20 | Stop reason: SDUPTHER

## 2017-09-20 RX ORDER — ESTRADIOL 2 MG/1
TABLET ORAL
Qty: 30 TABLET | Refills: 2 | Status: SHIPPED | OUTPATIENT
Start: 2017-09-20 | End: 2020-08-20 | Stop reason: SDUPTHER

## 2017-09-28 ENCOUNTER — INFUSION (OUTPATIENT)
Dept: ONCOLOGY | Facility: HOSPITAL | Age: 42
End: 2017-09-28

## 2017-09-28 VITALS
RESPIRATION RATE: 18 BRPM | DIASTOLIC BLOOD PRESSURE: 96 MMHG | HEART RATE: 84 BPM | SYSTOLIC BLOOD PRESSURE: 169 MMHG | TEMPERATURE: 98 F

## 2017-09-28 DIAGNOSIS — G35 MULTIPLE SCLEROSIS (HCC): Primary | ICD-10-CM

## 2017-09-28 PROCEDURE — 25010000002 NATALIZUMAB PER 1 MG: Performed by: PSYCHIATRY & NEUROLOGY

## 2017-09-28 PROCEDURE — 96365 THER/PROPH/DIAG IV INF INIT: CPT | Performed by: HOSPITALIST

## 2017-09-28 RX ORDER — SODIUM CHLORIDE 9 MG/ML
250 INJECTION, SOLUTION INTRAVENOUS ONCE
Status: CANCELLED | OUTPATIENT
Start: 2017-09-28

## 2017-09-28 RX ORDER — DIPHENHYDRAMINE HYDROCHLORIDE 50 MG/ML
25 INJECTION INTRAMUSCULAR; INTRAVENOUS ONCE
Status: CANCELLED
Start: 2017-09-28 | End: 2017-09-28

## 2017-09-28 RX ORDER — ACETAMINOPHEN 500 MG
1000 TABLET ORAL AS NEEDED
Status: CANCELLED
Start: 2017-09-28

## 2017-09-28 RX ORDER — SODIUM CHLORIDE 9 MG/ML
250 INJECTION, SOLUTION INTRAVENOUS ONCE
Status: COMPLETED | OUTPATIENT
Start: 2017-09-28 | End: 2017-09-28

## 2017-09-28 RX ORDER — ONDANSETRON 2 MG/ML
4 INJECTION INTRAMUSCULAR; INTRAVENOUS AS NEEDED
Status: CANCELLED
Start: 2017-09-28

## 2017-09-28 RX ADMIN — SODIUM CHLORIDE 250 ML: 9 INJECTION, SOLUTION INTRAVENOUS at 10:50

## 2017-09-28 RX ADMIN — NATALIZUMAB 300 MG: 300 INJECTION INTRAVENOUS at 10:49

## 2017-10-26 ENCOUNTER — INFUSION (OUTPATIENT)
Dept: ONCOLOGY | Facility: HOSPITAL | Age: 42
End: 2017-10-26

## 2017-10-26 VITALS
SYSTOLIC BLOOD PRESSURE: 135 MMHG | DIASTOLIC BLOOD PRESSURE: 82 MMHG | TEMPERATURE: 98 F | HEART RATE: 86 BPM | RESPIRATION RATE: 18 BRPM

## 2017-10-26 DIAGNOSIS — E66.9 OBESITY, UNSPECIFIED CLASSIFICATION, UNSPECIFIED OBESITY TYPE, UNSPECIFIED WHETHER SERIOUS COMORBIDITY PRESENT: ICD-10-CM

## 2017-10-26 DIAGNOSIS — G35 MULTIPLE SCLEROSIS (HCC): Primary | ICD-10-CM

## 2017-10-26 DIAGNOSIS — Z79.899 DRUG THERAPY: ICD-10-CM

## 2017-10-26 DIAGNOSIS — M83.5 OTHER DRUG-INDUCED OSTEOMALACIA IN ADULTS: ICD-10-CM

## 2017-10-26 LAB
25(OH)D3 SERPL-MCNC: 73.3 NG/ML (ref 30–100)
ALBUMIN SERPL-MCNC: 3.9 G/DL (ref 3.4–4.8)
ALP SERPL-CCNC: 120 U/L (ref 38–126)
ALT SERPL W P-5'-P-CCNC: 29 U/L (ref 9–52)
AST SERPL-CCNC: 19 U/L (ref 14–36)
BASOPHILS # BLD AUTO: 0.06 10*3/MM3 (ref 0–0.2)
BASOPHILS NFR BLD AUTO: 0.6 % (ref 0–2)
BILIRUB CONJ SERPL-MCNC: 0 MG/DL (ref 0–0.3)
BILIRUB INDIRECT SERPL-MCNC: 0.1 MG/DL (ref 0–1.1)
BILIRUB SERPL-MCNC: 0.3 MG/DL (ref 0.2–1.3)
DEPRECATED RDW RBC AUTO: 48 FL (ref 36.4–46.3)
EOSINOPHIL # BLD AUTO: 0.3 10*3/MM3 (ref 0–0.7)
EOSINOPHIL NFR BLD AUTO: 3 % (ref 0–7)
ERYTHROCYTE [DISTWIDTH] IN BLOOD BY AUTOMATED COUNT: 17.2 % (ref 11.5–14.5)
HCT VFR BLD AUTO: 35.3 % (ref 35–45)
HGB BLD-MCNC: 10.8 G/DL (ref 12–15.5)
IMM GRANULOCYTES # BLD: 0.05 10*3/MM3 (ref 0–0.02)
IMM GRANULOCYTES NFR BLD: 0.5 % (ref 0–0.5)
LYMPHOCYTES # BLD AUTO: 3.75 10*3/MM3 (ref 0.6–4.2)
LYMPHOCYTES NFR BLD AUTO: 38 % (ref 10–50)
MCH RBC QN AUTO: 23.3 PG (ref 26.5–34)
MCHC RBC AUTO-ENTMCNC: 30.6 G/DL (ref 31.4–36)
MCV RBC AUTO: 76.1 FL (ref 80–98)
MONOCYTES # BLD AUTO: 0.67 10*3/MM3 (ref 0–0.9)
MONOCYTES NFR BLD AUTO: 6.8 % (ref 0–12)
NEUTROPHILS # BLD AUTO: 5.03 10*3/MM3 (ref 2–8.6)
NEUTROPHILS NFR BLD AUTO: 51.1 % (ref 37–80)
PLATELET # BLD AUTO: 316 10*3/MM3 (ref 150–450)
PMV BLD AUTO: 9.9 FL (ref 8–12)
PROT SERPL-MCNC: 7 G/DL (ref 6.3–8.6)
RBC # BLD AUTO: 4.64 10*6/MM3 (ref 3.77–5.16)
WBC NRBC COR # BLD: 9.86 10*3/MM3 (ref 3.2–9.8)

## 2017-10-26 PROCEDURE — 96365 THER/PROPH/DIAG IV INF INIT: CPT | Performed by: HOSPITALIST

## 2017-10-26 PROCEDURE — 36415 COLL VENOUS BLD VENIPUNCTURE: CPT | Performed by: HOSPITALIST

## 2017-10-26 PROCEDURE — 25010000002 NATALIZUMAB PER 1 MG: Performed by: PSYCHIATRY & NEUROLOGY

## 2017-10-26 PROCEDURE — 85025 COMPLETE CBC W/AUTO DIFF WBC: CPT | Performed by: HOSPITALIST

## 2017-10-26 PROCEDURE — 82306 VITAMIN D 25 HYDROXY: CPT | Performed by: HOSPITALIST

## 2017-10-26 PROCEDURE — 80076 HEPATIC FUNCTION PANEL: CPT | Performed by: HOSPITALIST

## 2017-10-26 RX ORDER — ONDANSETRON 2 MG/ML
4 INJECTION INTRAMUSCULAR; INTRAVENOUS AS NEEDED
Status: CANCELLED
Start: 2017-10-26

## 2017-10-26 RX ORDER — ACETAMINOPHEN 500 MG
1000 TABLET ORAL AS NEEDED
Status: CANCELLED
Start: 2017-10-26

## 2017-10-26 RX ORDER — SODIUM CHLORIDE 9 MG/ML
250 INJECTION, SOLUTION INTRAVENOUS ONCE
Status: COMPLETED | OUTPATIENT
Start: 2017-10-26 | End: 2017-10-26

## 2017-10-26 RX ORDER — DIPHENHYDRAMINE HYDROCHLORIDE 50 MG/ML
25 INJECTION INTRAMUSCULAR; INTRAVENOUS ONCE
Status: CANCELLED
Start: 2017-10-26 | End: 2017-10-26

## 2017-10-26 RX ORDER — SODIUM CHLORIDE 9 MG/ML
250 INJECTION, SOLUTION INTRAVENOUS ONCE
Status: CANCELLED | OUTPATIENT
Start: 2017-10-26

## 2017-10-26 RX ADMIN — SODIUM CHLORIDE 250 ML: 9 INJECTION, SOLUTION INTRAVENOUS at 09:07

## 2017-10-26 RX ADMIN — NATALIZUMAB 300 MG: 300 INJECTION INTRAVENOUS at 09:07

## 2017-11-30 ENCOUNTER — INFUSION (OUTPATIENT)
Dept: ONCOLOGY | Facility: HOSPITAL | Age: 42
End: 2017-11-30

## 2017-11-30 VITALS
RESPIRATION RATE: 18 BRPM | TEMPERATURE: 97.5 F | SYSTOLIC BLOOD PRESSURE: 169 MMHG | HEART RATE: 78 BPM | DIASTOLIC BLOOD PRESSURE: 86 MMHG

## 2017-11-30 DIAGNOSIS — G35 MULTIPLE SCLEROSIS (HCC): Primary | ICD-10-CM

## 2017-11-30 PROCEDURE — 96365 THER/PROPH/DIAG IV INF INIT: CPT | Performed by: NURSE PRACTITIONER

## 2017-11-30 PROCEDURE — 25010000002 NATALIZUMAB PER 1 MG: Performed by: PSYCHIATRY & NEUROLOGY

## 2017-11-30 RX ORDER — SODIUM CHLORIDE 9 MG/ML
250 INJECTION, SOLUTION INTRAVENOUS ONCE
Status: CANCELLED | OUTPATIENT
Start: 2017-11-30

## 2017-11-30 RX ORDER — ACETAMINOPHEN 500 MG
1000 TABLET ORAL AS NEEDED
Status: CANCELLED
Start: 2017-11-30

## 2017-11-30 RX ORDER — DIPHENHYDRAMINE HYDROCHLORIDE 50 MG/ML
25 INJECTION INTRAMUSCULAR; INTRAVENOUS ONCE
Status: CANCELLED
Start: 2017-11-30 | End: 2017-11-30

## 2017-11-30 RX ORDER — SODIUM CHLORIDE 9 MG/ML
250 INJECTION, SOLUTION INTRAVENOUS ONCE
Status: COMPLETED | OUTPATIENT
Start: 2017-11-30 | End: 2017-11-30

## 2017-11-30 RX ORDER — ONDANSETRON 2 MG/ML
4 INJECTION INTRAMUSCULAR; INTRAVENOUS AS NEEDED
Status: CANCELLED
Start: 2017-11-30

## 2017-11-30 RX ADMIN — NATALIZUMAB 300 MG: 300 INJECTION INTRAVENOUS at 09:30

## 2017-11-30 RX ADMIN — SODIUM CHLORIDE 250 ML: 9 INJECTION, SOLUTION INTRAVENOUS at 09:30

## 2017-12-28 ENCOUNTER — APPOINTMENT (OUTPATIENT)
Dept: ONCOLOGY | Facility: HOSPITAL | Age: 42
End: 2017-12-28

## 2017-12-29 ENCOUNTER — APPOINTMENT (OUTPATIENT)
Dept: ONCOLOGY | Facility: HOSPITAL | Age: 42
End: 2017-12-29

## 2018-01-04 ENCOUNTER — INFUSION (OUTPATIENT)
Dept: ONCOLOGY | Facility: HOSPITAL | Age: 43
End: 2018-01-04

## 2018-01-04 VITALS
HEART RATE: 78 BPM | SYSTOLIC BLOOD PRESSURE: 162 MMHG | RESPIRATION RATE: 18 BRPM | DIASTOLIC BLOOD PRESSURE: 85 MMHG | TEMPERATURE: 97.3 F

## 2018-01-04 DIAGNOSIS — G35 MULTIPLE SCLEROSIS (HCC): Primary | ICD-10-CM

## 2018-01-04 PROCEDURE — 25010000002 NATALIZUMAB PER 1 MG: Performed by: PSYCHIATRY & NEUROLOGY

## 2018-01-04 PROCEDURE — 96365 THER/PROPH/DIAG IV INF INIT: CPT | Performed by: NURSE PRACTITIONER

## 2018-01-04 RX ORDER — DIPHENHYDRAMINE HYDROCHLORIDE 50 MG/ML
25 INJECTION INTRAMUSCULAR; INTRAVENOUS ONCE
Status: CANCELLED
Start: 2018-02-01 | End: 2018-02-01

## 2018-01-04 RX ORDER — SODIUM CHLORIDE 9 MG/ML
250 INJECTION, SOLUTION INTRAVENOUS ONCE
Status: CANCELLED | OUTPATIENT
Start: 2018-02-01 | End: 2018-02-01

## 2018-01-04 RX ORDER — ACETAMINOPHEN 500 MG
1000 TABLET ORAL AS NEEDED
Status: CANCELLED
Start: 2018-02-01

## 2018-01-04 RX ORDER — ONDANSETRON 2 MG/ML
4 INJECTION INTRAMUSCULAR; INTRAVENOUS AS NEEDED
Status: CANCELLED
Start: 2018-02-01

## 2018-01-04 RX ORDER — SODIUM CHLORIDE 9 MG/ML
250 INJECTION, SOLUTION INTRAVENOUS ONCE
Status: COMPLETED | OUTPATIENT
Start: 2018-01-04 | End: 2018-01-04

## 2018-01-04 RX ADMIN — SODIUM CHLORIDE 250 ML: 9 INJECTION, SOLUTION INTRAVENOUS at 09:58

## 2018-01-04 RX ADMIN — NATALIZUMAB 300 MG: 300 INJECTION INTRAVENOUS at 10:10

## 2018-01-04 NOTE — PATIENT INSTRUCTIONS
Fall Prevention in the Home   Falls can cause injuries. They can happen to people of all ages. There are many things you can do to make your home safe and to help prevent falls.   WHAT CAN I DO ON THE OUTSIDE OF MY HOME?  · Regularly fix the edges of walkways and driveways and fix any cracks.  · Remove anything that might make you trip as you walk through a door, such as a raised step or threshold.  · Trim any bushes or trees on the path to your home.  · Use bright outdoor lighting.  · Clear any walking paths of anything that might make someone trip, such as rocks or tools.  · Regularly check to see if handrails are loose or broken. Make sure that both sides of any steps have handrails.  · Any raised decks and porches should have guardrails on the edges.  · Have any leaves, snow, or ice cleared regularly.  · Use sand or salt on walking paths during winter.  · Clean up any spills in your garage right away. This includes oil or grease spills.  WHAT CAN I DO IN THE BATHROOM?   · Use night lights.  · Install grab bars by the toilet and in the tub and shower. Do not use towel bars as grab bars.  · Use non-skid mats or decals in the tub or shower.  · If you need to sit down in the shower, use a plastic, non-slip stool.  · Keep the floor dry. Clean up any water that spills on the floor as soon as it happens.  · Remove soap buildup in the tub or shower regularly.  · Attach bath mats securely with double-sided non-slip rug tape.  · Do not have throw rugs and other things on the floor that can make you trip.  WHAT CAN I DO IN THE BEDROOM?  · Use night lights.  · Make sure that you have a light by your bed that is easy to reach.  · Do not use any sheets or blankets that are too big for your bed. They should not hang down onto the floor.  · Have a firm chair that has side arms. You can use this for support while you get dressed.  · Do not have throw rugs and other things on the floor that can make you trip.  WHAT CAN I DO IN  THE KITCHEN?  · Clean up any spills right away.  · Avoid walking on wet floors.  · Keep items that you use a lot in easy-to-reach places.  · If you need to reach something above you, use a strong step stool that has a grab bar.  · Keep electrical cords out of the way.  · Do not use floor polish or wax that makes floors slippery. If you must use wax, use non-skid floor wax.  · Do not have throw rugs and other things on the floor that can make you trip.  WHAT CAN I DO WITH MY STAIRS?  · Do not leave any items on the stairs.  · Make sure that there are handrails on both sides of the stairs and use them. Fix handrails that are broken or loose. Make sure that handrails are as long as the stairways.  · Check any carpeting to make sure that it is firmly attached to the stairs. Fix any carpet that is loose or worn.  · Avoid having throw rugs at the top or bottom of the stairs. If you do have throw rugs, attach them to the floor with carpet tape.  · Make sure that you have a light switch at the top of the stairs and the bottom of the stairs. If you do not have them, ask someone to add them for you.  WHAT ELSE CAN I DO TO HELP PREVENT FALLS?  · Wear shoes that:    Do not have high heels.    Have rubber bottoms.    Are comfortable and fit you well.    Are closed at the toe. Do not wear sandals.  · If you use a stepladder:    Make sure that it is fully opened. Do not climb a closed stepladder.    Make sure that both sides of the stepladder are locked into place.    Ask someone to hold it for you, if possible.  · Clearly pal and make sure that you can see:    Any grab bars or handrails.    First and last steps.    Where the edge of each step is.  · Use tools that help you move around (mobility aids) if they are needed. These include:    Canes.    Walkers.    Scooters.    Crutches.  · Turn on the lights when you go into a dark area. Replace any light bulbs as soon as they burn out.  · Set up your furniture so you have a clear  path. Avoid moving your furniture around.  · If any of your floors are uneven, fix them.  · If there are any pets around you, be aware of where they are.  · Review your medicines with your doctor. Some medicines can make you feel dizzy. This can increase your chance of falling.  Ask your doctor what other things that you can do to help prevent falls.     This information is not intended to replace advice given to you by your health care provider. Make sure you discuss any questions you have with your health care provider.     Document Released: 10/14/2010 Document Revised: 05/03/2016 Document Reviewed: 01/22/2016  [employer] Interactive Patient Education ©2017 [employer]  Fall Prevention in the Home   Falls can cause injuries and can affect people from all age groups. There are many simple things that you can do to make your home safe and to help prevent falls.  WHAT CAN I DO ON THE OUTSIDE OF MY HOME?  · Regularly repair the edges of walkways and driveways and fix any cracks.  · Remove high doorway thresholds.  · Trim any shrubbery on the main path into your home.  · Use bright outdoor lighting.  · Clear walkways of debris and clutter, including tools and rocks.  · Regularly check that handrails are securely fastened and in good repair. Both sides of any steps should have handrails.  · Install guardrails along the edges of any raised decks or porches.  · Have leaves, snow, and ice cleared regularly.  · Use sand or salt on walkways during winter months.  · In the garage, clean up any spills right away, including grease or oil spills.  WHAT CAN I DO IN THE BATHROOM?  · Use night lights.  · Install grab bars by the toilet and in the tub and shower. Do not use towel bars as grab bars.  · Use non-skid mats or decals on the floor of the tub or shower.  · If you need to sit down while you are in the shower, use a plastic, non-slip stool.  · Keep the floor dry. Immediately clean up any water that spills on the  floor.  · Remove soap buildup in the tub or shower on a regular basis.  · Attach bath mats securely with double-sided non-slip rug tape.  · Remove throw rugs and other tripping hazards from the floor.  WHAT CAN I DO IN THE BEDROOM?  · Use night lights.  · Make sure that a bedside light is easy to reach.  · Do not use oversized bedding that drapes onto the floor.  · Have a firm chair that has side arms to use for getting dressed.  · Remove throw rugs and other tripping hazards from the floor.  WHAT CAN I DO IN THE KITCHEN?   · Clean up any spills right away.  · Avoid walking on wet floors.  · Place frequently used items in easy-to-reach places.  · If you need to reach for something above you, use a sturdy step stool that has a grab bar.  · Keep electrical cables out of the way.  · Do not use floor polish or wax that makes floors slippery. If you have to use wax, make sure that it is non-skid floor wax.  · Remove throw rugs and other tripping hazards from the floor.  WHAT CAN I DO IN THE STAIRWAYS?  · Do not leave any items on the stairs.  · Make sure that there are handrails on both sides of the stairs. Fix handrails that are broken or loose. Make sure that handrails are as long as the stairways.  · Check any carpeting to make sure that it is firmly attached to the stairs. Fix any carpet that is loose or worn.  · Avoid having throw rugs at the top or bottom of stairways, or secure the rugs with carpet tape to prevent them from moving.  · Make sure that you have a light switch at the top of the stairs and the bottom of the stairs. If you do not have them, have them installed.  WHAT ARE SOME OTHER FALL PREVENTION TIPS?  · Wear closed-toe shoes that fit well and support your feet. Wear shoes that have rubber soles or low heels.  · When you use a stepladder, make sure that it is completely opened and that the sides are firmly locked. Have someone hold the ladder while you are using it. Do not climb a closed  stepladder.  · Add color or contrast paint or tape to grab bars and handrails in your home. Place contrasting color strips on the first and last steps.  · Use mobility aids as needed, such as canes, walkers, scooters, and crutches.  · Turn on lights if it is dark. Replace any light bulbs that burn out.  · Set up furniture so that there are clear paths. Keep the furniture in the same spot.  · Fix any uneven floor surfaces.  · Choose a carpet design that does not hide the edge of steps of a stairway.  · Be aware of any and all pets.  · Review your medicines with your healthcare provider. Some medicines can cause dizziness or changes in blood pressure, which increase your risk of falling.  Talk with your health care provider about other ways that you can decrease your risk of falls. This may include working with a physical therapist or  to improve your strength, balance, and endurance.     This information is not intended to replace advice given to you by your health care provider. Make sure you discuss any questions you have with your health care provider.     Document Released: 12/08/2003 Document Revised: 04/10/2017 Document Reviewed: 01/22/2016  [employer] Interactive Patient Education ©2017 [employer]

## 2018-02-01 ENCOUNTER — INFUSION (OUTPATIENT)
Dept: ONCOLOGY | Facility: HOSPITAL | Age: 43
End: 2018-02-01

## 2018-02-01 VITALS
SYSTOLIC BLOOD PRESSURE: 160 MMHG | HEART RATE: 86 BPM | DIASTOLIC BLOOD PRESSURE: 80 MMHG | TEMPERATURE: 98 F | RESPIRATION RATE: 18 BRPM

## 2018-02-01 DIAGNOSIS — Z79.899 DRUG THERAPY: ICD-10-CM

## 2018-02-01 DIAGNOSIS — M83.5 OTHER DRUG-INDUCED OSTEOMALACIA IN ADULTS: ICD-10-CM

## 2018-02-01 DIAGNOSIS — E66.9 OBESITY, UNSPECIFIED CLASSIFICATION, UNSPECIFIED OBESITY TYPE, UNSPECIFIED WHETHER SERIOUS COMORBIDITY PRESENT: ICD-10-CM

## 2018-02-01 DIAGNOSIS — G35 MULTIPLE SCLEROSIS (HCC): Primary | ICD-10-CM

## 2018-02-01 LAB
ALBUMIN SERPL-MCNC: 3.7 G/DL (ref 3.4–4.8)
ALP SERPL-CCNC: 144 U/L (ref 38–126)
ALT SERPL W P-5'-P-CCNC: 28 U/L (ref 9–52)
AST SERPL-CCNC: 31 U/L (ref 14–36)
BILIRUB CONJ SERPL-MCNC: 0 MG/DL (ref 0–0.3)
BILIRUB INDIRECT SERPL-MCNC: 0 MG/DL (ref 0–1.1)
BILIRUB SERPL-MCNC: <0.1 MG/DL (ref 0.2–1.3)
PROT SERPL-MCNC: 6.9 G/DL (ref 6.3–8.6)

## 2018-02-01 PROCEDURE — 25010000002 NATALIZUMAB PER 1 MG: Performed by: PSYCHIATRY & NEUROLOGY

## 2018-02-01 PROCEDURE — 80076 HEPATIC FUNCTION PANEL: CPT

## 2018-02-01 PROCEDURE — 96365 THER/PROPH/DIAG IV INF INIT: CPT | Performed by: NURSE PRACTITIONER

## 2018-02-01 RX ORDER — ONDANSETRON 2 MG/ML
4 INJECTION INTRAMUSCULAR; INTRAVENOUS AS NEEDED
Status: CANCELLED
Start: 2018-03-01

## 2018-02-01 RX ORDER — ACETAMINOPHEN 500 MG
1000 TABLET ORAL AS NEEDED
Status: CANCELLED
Start: 2018-03-01

## 2018-02-01 RX ORDER — SODIUM CHLORIDE 9 MG/ML
250 INJECTION, SOLUTION INTRAVENOUS ONCE
Status: CANCELLED | OUTPATIENT
Start: 2018-03-01 | End: 2018-03-01

## 2018-02-01 RX ORDER — DIPHENHYDRAMINE HYDROCHLORIDE 50 MG/ML
25 INJECTION INTRAMUSCULAR; INTRAVENOUS ONCE
Status: CANCELLED
Start: 2018-03-01 | End: 2018-03-01

## 2018-02-01 RX ORDER — SODIUM CHLORIDE 9 MG/ML
250 INJECTION, SOLUTION INTRAVENOUS ONCE
Status: COMPLETED | OUTPATIENT
Start: 2018-02-01 | End: 2018-02-01

## 2018-02-01 RX ADMIN — NATALIZUMAB 300 MG: 300 INJECTION INTRAVENOUS at 11:15

## 2018-02-01 RX ADMIN — SODIUM CHLORIDE 250 ML: 9 INJECTION, SOLUTION INTRAVENOUS at 11:15

## 2018-03-01 ENCOUNTER — INFUSION (OUTPATIENT)
Dept: ONCOLOGY | Facility: HOSPITAL | Age: 43
End: 2018-03-01

## 2018-03-01 VITALS
SYSTOLIC BLOOD PRESSURE: 124 MMHG | TEMPERATURE: 96.4 F | DIASTOLIC BLOOD PRESSURE: 65 MMHG | RESPIRATION RATE: 18 BRPM | HEART RATE: 72 BPM

## 2018-03-01 DIAGNOSIS — G35 MULTIPLE SCLEROSIS (HCC): Primary | ICD-10-CM

## 2018-03-01 PROCEDURE — 96365 THER/PROPH/DIAG IV INF INIT: CPT | Performed by: NURSE PRACTITIONER

## 2018-03-01 PROCEDURE — 25010000002 NATALIZUMAB PER 1 MG: Performed by: PSYCHIATRY & NEUROLOGY

## 2018-03-01 RX ORDER — SODIUM CHLORIDE 9 MG/ML
250 INJECTION, SOLUTION INTRAVENOUS ONCE
Status: COMPLETED | OUTPATIENT
Start: 2018-03-01 | End: 2018-03-01

## 2018-03-01 RX ORDER — DIPHENHYDRAMINE HYDROCHLORIDE 50 MG/ML
25 INJECTION INTRAMUSCULAR; INTRAVENOUS ONCE
Status: CANCELLED
Start: 2018-03-29 | End: 2018-03-29

## 2018-03-01 RX ORDER — ACETAMINOPHEN 500 MG
1000 TABLET ORAL AS NEEDED
Status: CANCELLED
Start: 2018-03-29

## 2018-03-01 RX ORDER — ONDANSETRON 2 MG/ML
4 INJECTION INTRAMUSCULAR; INTRAVENOUS AS NEEDED
Status: CANCELLED
Start: 2018-03-29

## 2018-03-01 RX ORDER — SODIUM CHLORIDE 9 MG/ML
250 INJECTION, SOLUTION INTRAVENOUS ONCE
Status: CANCELLED | OUTPATIENT
Start: 2018-03-29 | End: 2018-03-29

## 2018-03-01 RX ADMIN — NATALIZUMAB 300 MG: 300 INJECTION INTRAVENOUS at 10:56

## 2018-03-01 RX ADMIN — SODIUM CHLORIDE 250 ML: 9 INJECTION, SOLUTION INTRAVENOUS at 10:10

## 2018-03-29 ENCOUNTER — INFUSION (OUTPATIENT)
Dept: ONCOLOGY | Facility: HOSPITAL | Age: 43
End: 2018-03-29

## 2018-03-29 VITALS
SYSTOLIC BLOOD PRESSURE: 146 MMHG | DIASTOLIC BLOOD PRESSURE: 82 MMHG | HEART RATE: 84 BPM | TEMPERATURE: 97.8 F | RESPIRATION RATE: 18 BRPM

## 2018-03-29 DIAGNOSIS — G35 MULTIPLE SCLEROSIS (HCC): Primary | ICD-10-CM

## 2018-03-29 PROCEDURE — 96365 THER/PROPH/DIAG IV INF INIT: CPT | Performed by: NURSE PRACTITIONER

## 2018-03-29 PROCEDURE — 25010000002 NATALIZUMAB PER 1 MG: Performed by: PSYCHIATRY & NEUROLOGY

## 2018-03-29 RX ORDER — ONDANSETRON 2 MG/ML
4 INJECTION INTRAMUSCULAR; INTRAVENOUS AS NEEDED
Status: CANCELLED
Start: 2018-03-29

## 2018-03-29 RX ORDER — SODIUM CHLORIDE 9 MG/ML
250 INJECTION, SOLUTION INTRAVENOUS ONCE
Status: COMPLETED | OUTPATIENT
Start: 2018-03-29 | End: 2018-03-29

## 2018-03-29 RX ORDER — SODIUM CHLORIDE 9 MG/ML
250 INJECTION, SOLUTION INTRAVENOUS ONCE
Status: CANCELLED | OUTPATIENT
Start: 2018-03-29

## 2018-03-29 RX ORDER — ACETAMINOPHEN 500 MG
1000 TABLET ORAL AS NEEDED
Status: CANCELLED
Start: 2018-03-29

## 2018-03-29 RX ORDER — DIPHENHYDRAMINE HYDROCHLORIDE 50 MG/ML
25 INJECTION INTRAMUSCULAR; INTRAVENOUS ONCE
Status: CANCELLED
Start: 2018-03-29 | End: 2018-03-29

## 2018-03-29 RX ADMIN — NATALIZUMAB 300 MG: 300 INJECTION INTRAVENOUS at 10:47

## 2018-03-29 RX ADMIN — SODIUM CHLORIDE 250 ML: 9 INJECTION, SOLUTION INTRAVENOUS at 10:45

## 2018-04-13 RX ORDER — ACETAMINOPHEN 325 MG/1
650 TABLET ORAL EVERY 6 HOURS PRN
Status: CANCELLED
Start: 2018-04-26

## 2018-04-13 RX ORDER — ONDANSETRON 4 MG/1
4 TABLET, FILM COATED ORAL EVERY 6 HOURS PRN
Status: CANCELLED
Start: 2018-04-26

## 2018-04-13 RX ORDER — SODIUM CHLORIDE 9 MG/ML
250 INJECTION, SOLUTION INTRAVENOUS ONCE
Status: CANCELLED | OUTPATIENT
Start: 2018-04-13

## 2018-04-13 RX ORDER — DIPHENHYDRAMINE HYDROCHLORIDE 50 MG/ML
25 INJECTION INTRAMUSCULAR; INTRAVENOUS EVERY 6 HOURS PRN
Status: CANCELLED
Start: 2018-04-26

## 2018-04-26 ENCOUNTER — INFUSION (OUTPATIENT)
Dept: ONCOLOGY | Facility: HOSPITAL | Age: 43
End: 2018-04-26

## 2018-04-26 VITALS
TEMPERATURE: 97.2 F | DIASTOLIC BLOOD PRESSURE: 72 MMHG | SYSTOLIC BLOOD PRESSURE: 158 MMHG | HEART RATE: 82 BPM | RESPIRATION RATE: 18 BRPM

## 2018-04-26 DIAGNOSIS — M83.5 OTHER DRUG-INDUCED OSTEOMALACIA IN ADULTS: ICD-10-CM

## 2018-04-26 DIAGNOSIS — G35 MULTIPLE SCLEROSIS (HCC): Primary | ICD-10-CM

## 2018-04-26 DIAGNOSIS — Z79.899 DRUG THERAPY: ICD-10-CM

## 2018-04-26 DIAGNOSIS — E66.9 OBESITY, UNSPECIFIED CLASSIFICATION, UNSPECIFIED OBESITY TYPE, UNSPECIFIED WHETHER SERIOUS COMORBIDITY PRESENT: ICD-10-CM

## 2018-04-26 LAB
25(OH)D3 SERPL-MCNC: 44.4 NG/ML (ref 30–100)
ALBUMIN SERPL-MCNC: 3.6 G/DL (ref 3.4–4.8)
ALP SERPL-CCNC: 154 U/L (ref 38–126)
ALT SERPL W P-5'-P-CCNC: 30 U/L (ref 9–52)
AST SERPL-CCNC: 19 U/L (ref 14–36)
BASOPHILS # BLD AUTO: 0.07 10*3/MM3 (ref 0–0.2)
BASOPHILS NFR BLD AUTO: 0.8 % (ref 0–2)
BILIRUB CONJ SERPL-MCNC: 0 MG/DL (ref 0–0.3)
BILIRUB INDIRECT SERPL-MCNC: 0 MG/DL (ref 0–1.1)
BILIRUB SERPL-MCNC: <0.1 MG/DL (ref 0.2–1.3)
DEPRECATED RDW RBC AUTO: 49.7 FL (ref 36.4–46.3)
EOSINOPHIL # BLD AUTO: 0.29 10*3/MM3 (ref 0–0.7)
EOSINOPHIL NFR BLD AUTO: 3.3 % (ref 0–7)
ERYTHROCYTE [DISTWIDTH] IN BLOOD BY AUTOMATED COUNT: 18.1 % (ref 11.5–14.5)
HCT VFR BLD AUTO: 31.3 % (ref 35–45)
HGB BLD-MCNC: 9.9 G/DL (ref 12–15.5)
IMM GRANULOCYTES # BLD: 0.03 10*3/MM3 (ref 0–0.02)
IMM GRANULOCYTES NFR BLD: 0.3 % (ref 0–0.5)
LYMPHOCYTES # BLD AUTO: 3.62 10*3/MM3 (ref 0.6–4.2)
LYMPHOCYTES NFR BLD AUTO: 41.3 % (ref 10–50)
MCH RBC QN AUTO: 23.7 PG (ref 26.5–34)
MCHC RBC AUTO-ENTMCNC: 31.6 G/DL (ref 31.4–36)
MCV RBC AUTO: 75.1 FL (ref 80–98)
MONOCYTES # BLD AUTO: 0.77 10*3/MM3 (ref 0–0.9)
MONOCYTES NFR BLD AUTO: 8.8 % (ref 0–12)
NEUTROPHILS # BLD AUTO: 3.98 10*3/MM3 (ref 2–8.6)
NEUTROPHILS NFR BLD AUTO: 45.5 % (ref 37–80)
PLATELET # BLD AUTO: 315 10*3/MM3 (ref 150–450)
PMV BLD AUTO: 10.4 FL (ref 8–12)
PROT SERPL-MCNC: 6.3 G/DL (ref 6.3–8.6)
RBC # BLD AUTO: 4.17 10*6/MM3 (ref 3.77–5.16)
WBC NRBC COR # BLD: 8.76 10*3/MM3 (ref 3.2–9.8)

## 2018-04-26 PROCEDURE — 82306 VITAMIN D 25 HYDROXY: CPT | Performed by: PSYCHIATRY & NEUROLOGY

## 2018-04-26 PROCEDURE — 25010000002 NATALIZUMAB PER 1 MG: Performed by: NURSE PRACTITIONER

## 2018-04-26 PROCEDURE — 36415 COLL VENOUS BLD VENIPUNCTURE: CPT | Performed by: NURSE PRACTITIONER

## 2018-04-26 PROCEDURE — 85025 COMPLETE CBC W/AUTO DIFF WBC: CPT | Performed by: PSYCHIATRY & NEUROLOGY

## 2018-04-26 PROCEDURE — 80076 HEPATIC FUNCTION PANEL: CPT | Performed by: PSYCHIATRY & NEUROLOGY

## 2018-04-26 PROCEDURE — 96365 THER/PROPH/DIAG IV INF INIT: CPT | Performed by: NURSE PRACTITIONER

## 2018-04-26 RX ORDER — ONDANSETRON 4 MG/1
4 TABLET, FILM COATED ORAL EVERY 6 HOURS PRN
Status: CANCELLED
Start: 2018-05-24

## 2018-04-26 RX ORDER — SODIUM CHLORIDE 9 MG/ML
250 INJECTION, SOLUTION INTRAVENOUS ONCE
Status: CANCELLED | OUTPATIENT
Start: 2018-05-24 | End: 2018-05-24

## 2018-04-26 RX ORDER — SODIUM CHLORIDE 9 MG/ML
250 INJECTION, SOLUTION INTRAVENOUS ONCE
Status: COMPLETED | OUTPATIENT
Start: 2018-04-26 | End: 2018-04-26

## 2018-04-26 RX ORDER — ACETAMINOPHEN 325 MG/1
650 TABLET ORAL EVERY 6 HOURS PRN
Status: CANCELLED
Start: 2018-05-24

## 2018-04-26 RX ORDER — DIPHENHYDRAMINE HYDROCHLORIDE 50 MG/ML
25 INJECTION INTRAMUSCULAR; INTRAVENOUS EVERY 6 HOURS PRN
Status: CANCELLED
Start: 2018-05-24

## 2018-04-26 RX ADMIN — NATALIZUMAB 300 MG: 300 INJECTION INTRAVENOUS at 13:34

## 2018-04-26 RX ADMIN — SODIUM CHLORIDE 250 ML: 9 INJECTION, SOLUTION INTRAVENOUS at 13:18

## 2018-05-03 ENCOUNTER — TELEPHONE (OUTPATIENT)
Dept: ONCOLOGY | Facility: CLINIC | Age: 43
End: 2018-05-03

## 2018-05-24 ENCOUNTER — INFUSION (OUTPATIENT)
Dept: ONCOLOGY | Facility: HOSPITAL | Age: 43
End: 2018-05-24

## 2018-05-24 VITALS
SYSTOLIC BLOOD PRESSURE: 171 MMHG | TEMPERATURE: 97.2 F | RESPIRATION RATE: 20 BRPM | HEART RATE: 87 BPM | DIASTOLIC BLOOD PRESSURE: 89 MMHG

## 2018-05-24 DIAGNOSIS — G35 MULTIPLE SCLEROSIS (HCC): Primary | ICD-10-CM

## 2018-05-24 PROCEDURE — 25010000002 NATALIZUMAB PER 1 MG: Performed by: NURSE PRACTITIONER

## 2018-05-24 PROCEDURE — 96365 THER/PROPH/DIAG IV INF INIT: CPT | Performed by: NURSE PRACTITIONER

## 2018-05-24 RX ORDER — ACETAMINOPHEN 325 MG/1
650 TABLET ORAL EVERY 6 HOURS PRN
Status: CANCELLED
Start: 2018-05-24

## 2018-05-24 RX ORDER — SODIUM CHLORIDE 9 MG/ML
250 INJECTION, SOLUTION INTRAVENOUS ONCE
Status: COMPLETED | OUTPATIENT
Start: 2018-05-24 | End: 2018-05-24

## 2018-05-24 RX ORDER — DIPHENHYDRAMINE HYDROCHLORIDE 50 MG/ML
25 INJECTION INTRAMUSCULAR; INTRAVENOUS EVERY 6 HOURS PRN
Status: CANCELLED
Start: 2018-05-24

## 2018-05-24 RX ORDER — ONDANSETRON 4 MG/1
4 TABLET, FILM COATED ORAL EVERY 6 HOURS PRN
Status: CANCELLED
Start: 2018-05-24

## 2018-05-24 RX ORDER — SODIUM CHLORIDE 9 MG/ML
250 INJECTION, SOLUTION INTRAVENOUS ONCE
Status: CANCELLED | OUTPATIENT
Start: 2018-05-24

## 2018-05-24 RX ADMIN — NATALIZUMAB 300 MG: 300 INJECTION INTRAVENOUS at 14:05

## 2018-05-24 RX ADMIN — SODIUM CHLORIDE 250 ML: 9 INJECTION, SOLUTION INTRAVENOUS at 13:40

## 2018-06-19 RX ORDER — ACETAMINOPHEN 325 MG/1
650 TABLET ORAL ONCE
Status: CANCELLED | OUTPATIENT
Start: 2018-06-19

## 2018-06-19 RX ORDER — LORATADINE 10 MG/1
10 TABLET ORAL DAILY
Status: CANCELLED
Start: 2018-06-19

## 2018-06-19 RX ORDER — SODIUM CHLORIDE 9 MG/ML
250 INJECTION, SOLUTION INTRAVENOUS ONCE
Status: CANCELLED | OUTPATIENT
Start: 2018-06-19

## 2018-06-19 RX ORDER — DIPHENHYDRAMINE HYDROCHLORIDE 50 MG/ML
25 INJECTION INTRAMUSCULAR; INTRAVENOUS ONCE
Status: CANCELLED
Start: 2018-06-19 | End: 2018-06-19

## 2018-06-19 RX ORDER — ONDANSETRON 4 MG/1
8 TABLET, FILM COATED ORAL ONCE
Status: CANCELLED
Start: 2018-06-19 | End: 2018-06-19

## 2018-06-21 ENCOUNTER — INFUSION (OUTPATIENT)
Dept: ONCOLOGY | Facility: HOSPITAL | Age: 43
End: 2018-06-21

## 2018-06-21 VITALS
RESPIRATION RATE: 18 BRPM | DIASTOLIC BLOOD PRESSURE: 76 MMHG | SYSTOLIC BLOOD PRESSURE: 155 MMHG | TEMPERATURE: 97.8 F | HEART RATE: 82 BPM

## 2018-06-21 DIAGNOSIS — G35 MULTIPLE SCLEROSIS (HCC): Primary | ICD-10-CM

## 2018-06-21 PROCEDURE — 25010000002 NATALIZUMAB PER 1 MG: Performed by: NURSE PRACTITIONER

## 2018-06-21 PROCEDURE — 96365 THER/PROPH/DIAG IV INF INIT: CPT | Performed by: NURSE PRACTITIONER

## 2018-06-21 RX ORDER — SODIUM CHLORIDE 9 MG/ML
250 INJECTION, SOLUTION INTRAVENOUS ONCE
Status: COMPLETED | OUTPATIENT
Start: 2018-06-21 | End: 2018-06-21

## 2018-06-21 RX ORDER — ACETAMINOPHEN 325 MG/1
650 TABLET ORAL ONCE
Status: CANCELLED | OUTPATIENT
Start: 2018-06-21

## 2018-06-21 RX ORDER — SODIUM CHLORIDE 9 MG/ML
250 INJECTION, SOLUTION INTRAVENOUS ONCE
Status: CANCELLED | OUTPATIENT
Start: 2018-07-19 | End: 2018-07-19

## 2018-06-21 RX ORDER — ONDANSETRON 4 MG/1
8 TABLET, FILM COATED ORAL ONCE
Status: CANCELLED
Start: 2018-07-19 | End: 2018-07-19

## 2018-06-21 RX ORDER — DIPHENHYDRAMINE HYDROCHLORIDE 50 MG/ML
25 INJECTION INTRAMUSCULAR; INTRAVENOUS ONCE
Status: CANCELLED
Start: 2018-06-21 | End: 2018-06-21

## 2018-06-21 RX ORDER — LORATADINE 10 MG/1
10 TABLET ORAL DAILY
Status: CANCELLED
Start: 2018-07-19

## 2018-06-21 RX ADMIN — NATALIZUMAB 300 MG: 300 INJECTION INTRAVENOUS at 13:53

## 2018-06-21 RX ADMIN — SODIUM CHLORIDE 250 ML: 9 INJECTION, SOLUTION INTRAVENOUS at 13:45

## 2018-07-19 ENCOUNTER — INFUSION (OUTPATIENT)
Dept: ONCOLOGY | Facility: HOSPITAL | Age: 43
End: 2018-07-19

## 2018-07-19 VITALS
HEART RATE: 73 BPM | RESPIRATION RATE: 16 BRPM | DIASTOLIC BLOOD PRESSURE: 58 MMHG | TEMPERATURE: 97.8 F | SYSTOLIC BLOOD PRESSURE: 124 MMHG

## 2018-07-19 DIAGNOSIS — M83.5 OTHER DRUG-INDUCED OSTEOMALACIA IN ADULTS: ICD-10-CM

## 2018-07-19 DIAGNOSIS — G35 MULTIPLE SCLEROSIS (HCC): Primary | ICD-10-CM

## 2018-07-19 DIAGNOSIS — Z79.899 DRUG THERAPY: ICD-10-CM

## 2018-07-19 DIAGNOSIS — E66.9 OBESITY, UNSPECIFIED CLASSIFICATION, UNSPECIFIED OBESITY TYPE, UNSPECIFIED WHETHER SERIOUS COMORBIDITY PRESENT: ICD-10-CM

## 2018-07-19 LAB
ALBUMIN SERPL-MCNC: 4 G/DL (ref 3.4–4.8)
ALP SERPL-CCNC: 138 U/L (ref 38–126)
ALT SERPL W P-5'-P-CCNC: 37 U/L (ref 9–52)
AST SERPL-CCNC: 49 U/L (ref 14–36)
BILIRUB CONJ SERPL-MCNC: 0 MG/DL (ref 0–0.3)
BILIRUB INDIRECT SERPL-MCNC: 0 MG/DL (ref 0–1.1)
BILIRUB SERPL-MCNC: 0.4 MG/DL (ref 0.2–1.3)
PROT SERPL-MCNC: 7.2 G/DL (ref 6.3–8.6)

## 2018-07-19 PROCEDURE — 96365 THER/PROPH/DIAG IV INF INIT: CPT | Performed by: NURSE PRACTITIONER

## 2018-07-19 PROCEDURE — 80076 HEPATIC FUNCTION PANEL: CPT

## 2018-07-19 PROCEDURE — 25010000002 NATALIZUMAB PER 1 MG: Performed by: NURSE PRACTITIONER

## 2018-07-19 RX ORDER — SODIUM CHLORIDE 9 MG/ML
250 INJECTION, SOLUTION INTRAVENOUS ONCE
Status: CANCELLED | OUTPATIENT
Start: 2018-08-16 | End: 2018-08-16

## 2018-07-19 RX ORDER — SODIUM CHLORIDE 9 MG/ML
250 INJECTION, SOLUTION INTRAVENOUS ONCE
Status: COMPLETED | OUTPATIENT
Start: 2018-07-19 | End: 2018-07-19

## 2018-07-19 RX ORDER — ACETAMINOPHEN 325 MG/1
650 TABLET ORAL ONCE
Status: DISCONTINUED | OUTPATIENT
Start: 2018-07-19 | End: 2018-07-19 | Stop reason: HOSPADM

## 2018-07-19 RX ORDER — LORATADINE 10 MG/1
10 TABLET ORAL DAILY
Status: CANCELLED
Start: 2018-07-19

## 2018-07-19 RX ORDER — DIPHENHYDRAMINE HYDROCHLORIDE 50 MG/ML
25 INJECTION INTRAMUSCULAR; INTRAVENOUS ONCE
Status: CANCELLED
Start: 2018-08-16 | End: 2018-08-16

## 2018-07-19 RX ORDER — ONDANSETRON 4 MG/1
8 TABLET, FILM COATED ORAL ONCE
Status: CANCELLED
Start: 2018-08-16 | End: 2018-08-16

## 2018-07-19 RX ORDER — ACETAMINOPHEN 325 MG/1
650 TABLET ORAL ONCE
Status: CANCELLED | OUTPATIENT
Start: 2018-08-16 | End: 2018-08-16

## 2018-07-19 RX ORDER — CETIRIZINE HYDROCHLORIDE 5 MG/1
10 TABLET ORAL DAILY
Status: CANCELLED
Start: 2018-08-16

## 2018-07-19 RX ORDER — LORATADINE 10 MG/1
10 TABLET ORAL DAILY
Status: DISCONTINUED | OUTPATIENT
Start: 2018-07-19 | End: 2018-07-19 | Stop reason: HOSPADM

## 2018-07-19 RX ORDER — ONDANSETRON 4 MG/1
8 TABLET, FILM COATED ORAL ONCE
Status: DISCONTINUED | OUTPATIENT
Start: 2018-07-19 | End: 2018-07-19 | Stop reason: HOSPADM

## 2018-07-19 RX ADMIN — NATALIZUMAB 300 MG: 300 INJECTION INTRAVENOUS at 13:38

## 2018-07-19 RX ADMIN — SODIUM CHLORIDE 250 ML: 9 INJECTION, SOLUTION INTRAVENOUS at 13:38

## 2018-08-16 ENCOUNTER — INFUSION (OUTPATIENT)
Dept: ONCOLOGY | Facility: HOSPITAL | Age: 43
End: 2018-08-16

## 2018-08-16 VITALS
TEMPERATURE: 97 F | DIASTOLIC BLOOD PRESSURE: 87 MMHG | HEART RATE: 79 BPM | RESPIRATION RATE: 18 BRPM | SYSTOLIC BLOOD PRESSURE: 159 MMHG

## 2018-08-16 DIAGNOSIS — G35 MULTIPLE SCLEROSIS (HCC): Primary | ICD-10-CM

## 2018-08-16 PROCEDURE — 96365 THER/PROPH/DIAG IV INF INIT: CPT | Performed by: NURSE PRACTITIONER

## 2018-08-16 PROCEDURE — A9270 NON-COVERED ITEM OR SERVICE: HCPCS | Performed by: NURSE PRACTITIONER

## 2018-08-16 PROCEDURE — 63710000001 ACETAMINOPHEN 325 MG TABLET: Performed by: NURSE PRACTITIONER

## 2018-08-16 PROCEDURE — 25010000002 NATALIZUMAB PER 1 MG: Performed by: NURSE PRACTITIONER

## 2018-08-16 RX ORDER — ACETAMINOPHEN 325 MG/1
650 TABLET ORAL ONCE
Status: CANCELLED | OUTPATIENT
Start: 2018-08-16

## 2018-08-16 RX ORDER — ACETAMINOPHEN 325 MG/1
650 TABLET ORAL ONCE
Status: COMPLETED | OUTPATIENT
Start: 2018-08-16 | End: 2018-08-16

## 2018-08-16 RX ORDER — SODIUM CHLORIDE 9 MG/ML
250 INJECTION, SOLUTION INTRAVENOUS ONCE
Status: COMPLETED | OUTPATIENT
Start: 2018-08-16 | End: 2018-08-16

## 2018-08-16 RX ORDER — ONDANSETRON 4 MG/1
8 TABLET, FILM COATED ORAL ONCE
Status: CANCELLED
Start: 2018-08-16 | End: 2018-08-16

## 2018-08-16 RX ORDER — CETIRIZINE HYDROCHLORIDE 10 MG/1
10 TABLET ORAL DAILY
Status: CANCELLED
Start: 2018-08-16

## 2018-08-16 RX ORDER — DIPHENHYDRAMINE HYDROCHLORIDE 50 MG/ML
25 INJECTION INTRAMUSCULAR; INTRAVENOUS ONCE
Status: CANCELLED
Start: 2018-08-16 | End: 2018-08-16

## 2018-08-16 RX ORDER — SODIUM CHLORIDE 9 MG/ML
250 INJECTION, SOLUTION INTRAVENOUS ONCE
Status: CANCELLED | OUTPATIENT
Start: 2018-08-16

## 2018-08-16 RX ADMIN — NATALIZUMAB 300 MG: 300 INJECTION INTRAVENOUS at 13:13

## 2018-08-16 RX ADMIN — ACETAMINOPHEN 650 MG: 325 TABLET ORAL at 13:07

## 2018-08-16 RX ADMIN — SODIUM CHLORIDE 250 ML: 9 INJECTION, SOLUTION INTRAVENOUS at 13:07

## 2018-09-13 ENCOUNTER — INFUSION (OUTPATIENT)
Dept: ONCOLOGY | Facility: HOSPITAL | Age: 43
End: 2018-09-13

## 2018-09-13 VITALS
SYSTOLIC BLOOD PRESSURE: 164 MMHG | RESPIRATION RATE: 18 BRPM | TEMPERATURE: 97.6 F | DIASTOLIC BLOOD PRESSURE: 86 MMHG | HEART RATE: 76 BPM

## 2018-09-13 DIAGNOSIS — G35 MULTIPLE SCLEROSIS (HCC): Primary | ICD-10-CM

## 2018-09-13 PROCEDURE — 96365 THER/PROPH/DIAG IV INF INIT: CPT | Performed by: NURSE PRACTITIONER

## 2018-09-13 PROCEDURE — 25010000002 NATALIZUMAB PER 1 MG: Performed by: NURSE PRACTITIONER

## 2018-09-13 RX ORDER — DIPHENHYDRAMINE HYDROCHLORIDE 50 MG/ML
25 INJECTION INTRAMUSCULAR; INTRAVENOUS ONCE
Status: CANCELLED
Start: 2018-09-13 | End: 2018-09-13

## 2018-09-13 RX ORDER — CETIRIZINE HYDROCHLORIDE 10 MG/1
10 TABLET ORAL DAILY
Status: CANCELLED
Start: 2018-09-13

## 2018-09-13 RX ORDER — ONDANSETRON 4 MG/1
8 TABLET, FILM COATED ORAL ONCE
Status: CANCELLED
Start: 2018-09-13 | End: 2018-09-13

## 2018-09-13 RX ORDER — ACETAMINOPHEN 325 MG/1
650 TABLET ORAL ONCE
Status: CANCELLED | OUTPATIENT
Start: 2018-09-13

## 2018-09-13 RX ORDER — ACETAMINOPHEN 325 MG/1
650 TABLET ORAL ONCE
Status: DISCONTINUED | OUTPATIENT
Start: 2018-09-13 | End: 2018-09-13 | Stop reason: HOSPADM

## 2018-09-13 RX ORDER — SODIUM CHLORIDE 9 MG/ML
250 INJECTION, SOLUTION INTRAVENOUS ONCE
Status: CANCELLED | OUTPATIENT
Start: 2018-09-13

## 2018-09-13 RX ORDER — SODIUM CHLORIDE 9 MG/ML
250 INJECTION, SOLUTION INTRAVENOUS ONCE
Status: COMPLETED | OUTPATIENT
Start: 2018-09-13 | End: 2018-09-13

## 2018-09-13 RX ADMIN — SODIUM CHLORIDE 250 ML: 9 INJECTION, SOLUTION INTRAVENOUS at 13:37

## 2018-09-13 RX ADMIN — NATALIZUMAB 300 MG: 300 INJECTION INTRAVENOUS at 14:23

## 2018-10-11 ENCOUNTER — INFUSION (OUTPATIENT)
Dept: ONCOLOGY | Facility: HOSPITAL | Age: 43
End: 2018-10-11

## 2018-10-11 VITALS
HEART RATE: 70 BPM | DIASTOLIC BLOOD PRESSURE: 77 MMHG | TEMPERATURE: 96.4 F | RESPIRATION RATE: 18 BRPM | SYSTOLIC BLOOD PRESSURE: 145 MMHG

## 2018-10-11 DIAGNOSIS — G35 MULTIPLE SCLEROSIS (HCC): Primary | ICD-10-CM

## 2018-10-11 LAB
ALBUMIN SERPL-MCNC: 3.5 G/DL (ref 3.4–4.8)
ALP SERPL-CCNC: 142 U/L (ref 38–126)
ALT SERPL W P-5'-P-CCNC: 29 U/L (ref 9–52)
AST SERPL-CCNC: 23 U/L (ref 14–36)
BASOPHILS # BLD AUTO: 0.06 10*3/MM3 (ref 0–0.2)
BASOPHILS NFR BLD AUTO: 0.6 % (ref 0–2)
BILIRUB CONJ SERPL-MCNC: 0 MG/DL (ref 0–0.3)
BILIRUB INDIRECT SERPL-MCNC: 0 MG/DL (ref 0–1.1)
BILIRUB SERPL-MCNC: 0.3 MG/DL (ref 0.2–1.3)
DEPRECATED RDW RBC AUTO: 51.6 FL (ref 36.4–46.3)
EOSINOPHIL # BLD AUTO: 0.44 10*3/MM3 (ref 0–0.7)
EOSINOPHIL NFR BLD AUTO: 4.7 % (ref 0–7)
ERYTHROCYTE [DISTWIDTH] IN BLOOD BY AUTOMATED COUNT: 18.2 % (ref 11.5–14.5)
HCT VFR BLD AUTO: 33.2 % (ref 35–45)
HGB BLD-MCNC: 10.3 G/DL (ref 12–15.5)
IMM GRANULOCYTES # BLD: 0.02 10*3/MM3 (ref 0–0.02)
IMM GRANULOCYTES NFR BLD: 0.2 % (ref 0–0.5)
LYMPHOCYTES # BLD AUTO: 3.59 10*3/MM3 (ref 0.6–4.2)
LYMPHOCYTES NFR BLD AUTO: 38.6 % (ref 10–50)
MCH RBC QN AUTO: 23.8 PG (ref 26.5–34)
MCHC RBC AUTO-ENTMCNC: 31 G/DL (ref 31.4–36)
MCV RBC AUTO: 76.7 FL (ref 80–98)
MONOCYTES # BLD AUTO: 0.52 10*3/MM3 (ref 0–0.9)
MONOCYTES NFR BLD AUTO: 5.6 % (ref 0–12)
NEUTROPHILS # BLD AUTO: 4.67 10*3/MM3 (ref 2–8.6)
NEUTROPHILS NFR BLD AUTO: 50.3 % (ref 37–80)
PLATELET # BLD AUTO: 311 10*3/MM3 (ref 150–450)
PMV BLD AUTO: 10.2 FL (ref 8–12)
PROT SERPL-MCNC: 6.7 G/DL (ref 6.3–8.6)
RBC # BLD AUTO: 4.33 10*6/MM3 (ref 3.77–5.16)
WBC NRBC COR # BLD: 9.3 10*3/MM3 (ref 3.2–9.8)

## 2018-10-11 PROCEDURE — 25010000002 NATALIZUMAB PER 1 MG: Performed by: NURSE PRACTITIONER

## 2018-10-11 PROCEDURE — 80076 HEPATIC FUNCTION PANEL: CPT

## 2018-10-11 PROCEDURE — 85025 COMPLETE CBC W/AUTO DIFF WBC: CPT

## 2018-10-11 PROCEDURE — 96365 THER/PROPH/DIAG IV INF INIT: CPT | Performed by: INTERNAL MEDICINE

## 2018-10-11 RX ORDER — ACETAMINOPHEN 325 MG/1
650 TABLET ORAL ONCE
Status: CANCELLED | OUTPATIENT
Start: 2018-10-11

## 2018-10-11 RX ORDER — SODIUM CHLORIDE 9 MG/ML
250 INJECTION, SOLUTION INTRAVENOUS ONCE
Status: COMPLETED | OUTPATIENT
Start: 2018-10-11 | End: 2018-10-11

## 2018-10-11 RX ORDER — ONDANSETRON 4 MG/1
8 TABLET, FILM COATED ORAL ONCE
Status: DISCONTINUED | OUTPATIENT
Start: 2018-10-11 | End: 2018-10-11 | Stop reason: HOSPADM

## 2018-10-11 RX ORDER — SODIUM CHLORIDE 9 MG/ML
250 INJECTION, SOLUTION INTRAVENOUS ONCE
Status: CANCELLED | OUTPATIENT
Start: 2018-10-11

## 2018-10-11 RX ORDER — ACETAMINOPHEN 325 MG/1
650 TABLET ORAL ONCE
Status: DISCONTINUED | OUTPATIENT
Start: 2018-10-11 | End: 2018-10-11 | Stop reason: HOSPADM

## 2018-10-11 RX ORDER — CETIRIZINE HYDROCHLORIDE 10 MG/1
10 TABLET ORAL ONCE
Status: DISCONTINUED | OUTPATIENT
Start: 2018-10-11 | End: 2018-10-11 | Stop reason: HOSPADM

## 2018-10-11 RX ORDER — DIPHENHYDRAMINE HYDROCHLORIDE 50 MG/ML
25 INJECTION INTRAMUSCULAR; INTRAVENOUS ONCE
Status: CANCELLED
Start: 2018-10-11 | End: 2018-10-11

## 2018-10-11 RX ORDER — CETIRIZINE HYDROCHLORIDE 10 MG/1
10 TABLET ORAL DAILY
Status: CANCELLED
Start: 2018-10-11

## 2018-10-11 RX ORDER — ONDANSETRON 4 MG/1
8 TABLET, FILM COATED ORAL ONCE
Status: CANCELLED
Start: 2018-10-11 | End: 2018-10-11

## 2018-10-11 RX ADMIN — NATALIZUMAB 300 MG: 300 INJECTION INTRAVENOUS at 14:09

## 2018-10-11 RX ADMIN — SODIUM CHLORIDE 250 ML: 9 INJECTION, SOLUTION INTRAVENOUS at 14:10

## 2018-11-08 ENCOUNTER — INFUSION (OUTPATIENT)
Dept: ONCOLOGY | Facility: HOSPITAL | Age: 43
End: 2018-11-08

## 2018-11-08 VITALS
HEART RATE: 88 BPM | DIASTOLIC BLOOD PRESSURE: 53 MMHG | RESPIRATION RATE: 18 BRPM | SYSTOLIC BLOOD PRESSURE: 104 MMHG | TEMPERATURE: 97.2 F

## 2018-11-08 DIAGNOSIS — G35 MULTIPLE SCLEROSIS (HCC): Primary | ICD-10-CM

## 2018-11-08 PROCEDURE — 96365 THER/PROPH/DIAG IV INF INIT: CPT | Performed by: NURSE PRACTITIONER

## 2018-11-08 PROCEDURE — 25010000002 NATALIZUMAB PER 1 MG: Performed by: NURSE PRACTITIONER

## 2018-11-08 RX ORDER — DULOXETIN HYDROCHLORIDE 30 MG/1
30 CAPSULE, DELAYED RELEASE ORAL 2 TIMES DAILY
COMMUNITY
End: 2022-07-28

## 2018-11-08 RX ORDER — ACETAMINOPHEN 325 MG/1
650 TABLET ORAL ONCE
Status: COMPLETED | OUTPATIENT
Start: 2018-11-08 | End: 2018-11-08

## 2018-11-08 RX ORDER — ONDANSETRON 4 MG/1
8 TABLET, FILM COATED ORAL ONCE
Status: CANCELLED
Start: 2018-11-08 | End: 2018-11-08

## 2018-11-08 RX ORDER — ACETAMINOPHEN 325 MG/1
650 TABLET ORAL ONCE
Status: CANCELLED | OUTPATIENT
Start: 2018-11-08

## 2018-11-08 RX ORDER — CETIRIZINE HYDROCHLORIDE 10 MG/1
10 TABLET ORAL ONCE
Status: DISCONTINUED | OUTPATIENT
Start: 2018-11-08 | End: 2018-11-08 | Stop reason: HOSPADM

## 2018-11-08 RX ORDER — CETIRIZINE HYDROCHLORIDE 10 MG/1
10 TABLET ORAL DAILY
Status: CANCELLED
Start: 2018-11-08

## 2018-11-08 RX ORDER — ONDANSETRON 4 MG/1
8 TABLET, FILM COATED ORAL ONCE
Status: COMPLETED | OUTPATIENT
Start: 2018-11-08 | End: 2018-11-08

## 2018-11-08 RX ORDER — DIPHENHYDRAMINE HYDROCHLORIDE 50 MG/ML
25 INJECTION INTRAMUSCULAR; INTRAVENOUS ONCE
Status: CANCELLED
Start: 2018-11-08 | End: 2018-11-08

## 2018-11-08 RX ORDER — SODIUM CHLORIDE 9 MG/ML
250 INJECTION, SOLUTION INTRAVENOUS ONCE
Status: COMPLETED | OUTPATIENT
Start: 2018-11-08 | End: 2018-11-08

## 2018-11-08 RX ORDER — SODIUM CHLORIDE 9 MG/ML
250 INJECTION, SOLUTION INTRAVENOUS ONCE
Status: CANCELLED | OUTPATIENT
Start: 2018-11-08

## 2018-11-08 RX ADMIN — ONDANSETRON 8 MG: 4 TABLET, FILM COATED ORAL at 14:46

## 2018-11-08 RX ADMIN — SODIUM CHLORIDE 250 ML: 9 INJECTION, SOLUTION INTRAVENOUS at 14:47

## 2018-11-08 RX ADMIN — NATALIZUMAB 300 MG: 300 INJECTION INTRAVENOUS at 15:06

## 2018-11-08 RX ADMIN — ACETAMINOPHEN 650 MG: 325 TABLET ORAL at 14:46

## 2018-12-06 ENCOUNTER — INFUSION (OUTPATIENT)
Dept: ONCOLOGY | Facility: HOSPITAL | Age: 43
End: 2018-12-06

## 2018-12-06 VITALS
HEART RATE: 87 BPM | RESPIRATION RATE: 18 BRPM | DIASTOLIC BLOOD PRESSURE: 90 MMHG | SYSTOLIC BLOOD PRESSURE: 126 MMHG | TEMPERATURE: 98.7 F

## 2018-12-06 DIAGNOSIS — G35 MULTIPLE SCLEROSIS (HCC): Primary | ICD-10-CM

## 2018-12-06 PROCEDURE — 25010000002 NATALIZUMAB PER 1 MG: Performed by: NURSE PRACTITIONER

## 2018-12-06 PROCEDURE — 96365 THER/PROPH/DIAG IV INF INIT: CPT | Performed by: NURSE PRACTITIONER

## 2018-12-06 RX ORDER — SODIUM CHLORIDE 9 MG/ML
250 INJECTION, SOLUTION INTRAVENOUS ONCE
Status: CANCELLED | OUTPATIENT
Start: 2018-12-06

## 2018-12-06 RX ORDER — SODIUM CHLORIDE 9 MG/ML
250 INJECTION, SOLUTION INTRAVENOUS ONCE
Status: COMPLETED | OUTPATIENT
Start: 2018-12-06 | End: 2018-12-06

## 2018-12-06 RX ORDER — CETIRIZINE HYDROCHLORIDE 10 MG/1
10 TABLET ORAL DAILY
Status: CANCELLED
Start: 2018-12-06

## 2018-12-06 RX ORDER — DIPHENHYDRAMINE HYDROCHLORIDE 50 MG/ML
25 INJECTION INTRAMUSCULAR; INTRAVENOUS ONCE
Status: CANCELLED
Start: 2018-12-06 | End: 2018-12-06

## 2018-12-06 RX ORDER — DULOXETIN HYDROCHLORIDE 30 MG/1
CAPSULE, DELAYED RELEASE ORAL
COMMUNITY
Start: 2018-09-25 | End: 2019-02-25 | Stop reason: SDUPTHER

## 2018-12-06 RX ORDER — ACETAMINOPHEN 325 MG/1
650 TABLET ORAL ONCE
Status: CANCELLED | OUTPATIENT
Start: 2018-12-06

## 2018-12-06 RX ORDER — ONDANSETRON 4 MG/1
8 TABLET, FILM COATED ORAL ONCE
Status: CANCELLED
Start: 2018-12-06 | End: 2018-12-06

## 2018-12-06 RX ADMIN — SODIUM CHLORIDE 250 ML: 9 INJECTION, SOLUTION INTRAVENOUS at 15:11

## 2018-12-06 RX ADMIN — NATALIZUMAB 300 MG: 300 INJECTION INTRAVENOUS at 15:11

## 2019-01-03 ENCOUNTER — INFUSION (OUTPATIENT)
Dept: ONCOLOGY | Facility: HOSPITAL | Age: 44
End: 2019-01-03

## 2019-01-03 VITALS
SYSTOLIC BLOOD PRESSURE: 121 MMHG | TEMPERATURE: 97.6 F | HEART RATE: 86 BPM | RESPIRATION RATE: 18 BRPM | DIASTOLIC BLOOD PRESSURE: 70 MMHG

## 2019-01-03 DIAGNOSIS — G35 MULTIPLE SCLEROSIS (HCC): Primary | ICD-10-CM

## 2019-01-03 LAB
ALBUMIN SERPL-MCNC: 3.9 G/DL (ref 3.4–4.8)
ALP SERPL-CCNC: 103 U/L (ref 38–126)
ALT SERPL W P-5'-P-CCNC: 22 U/L (ref 9–52)
AST SERPL-CCNC: 58 U/L (ref 14–36)
BILIRUB CONJ SERPL-MCNC: 0 MG/DL (ref 0–0.3)
BILIRUB INDIRECT SERPL-MCNC: 0.5 MG/DL (ref 0–1.1)
BILIRUB SERPL-MCNC: 0.3 MG/DL (ref 0.2–1.3)
PROT SERPL-MCNC: 7 G/DL (ref 6.3–8.6)

## 2019-01-03 PROCEDURE — 80076 HEPATIC FUNCTION PANEL: CPT | Performed by: NURSE PRACTITIONER

## 2019-01-03 PROCEDURE — 96365 THER/PROPH/DIAG IV INF INIT: CPT | Performed by: NURSE PRACTITIONER

## 2019-01-03 PROCEDURE — 25010000002 NATALIZUMAB PER 1 MG: Performed by: NURSE PRACTITIONER

## 2019-01-03 RX ORDER — ONDANSETRON 4 MG/1
8 TABLET, FILM COATED ORAL ONCE
Status: CANCELLED
Start: 2019-01-03 | End: 2019-01-03

## 2019-01-03 RX ORDER — DIPHENHYDRAMINE HYDROCHLORIDE 50 MG/ML
25 INJECTION INTRAMUSCULAR; INTRAVENOUS ONCE
Status: CANCELLED
Start: 2019-01-03 | End: 2019-01-03

## 2019-01-03 RX ORDER — SODIUM CHLORIDE 9 MG/ML
250 INJECTION, SOLUTION INTRAVENOUS ONCE
Status: COMPLETED | OUTPATIENT
Start: 2019-01-03 | End: 2019-01-03

## 2019-01-03 RX ORDER — ACETAMINOPHEN 325 MG/1
650 TABLET ORAL ONCE
Status: CANCELLED | OUTPATIENT
Start: 2019-01-03

## 2019-01-03 RX ORDER — CETIRIZINE HYDROCHLORIDE 10 MG/1
10 TABLET ORAL DAILY
Status: CANCELLED
Start: 2019-01-03

## 2019-01-03 RX ORDER — SODIUM CHLORIDE 9 MG/ML
250 INJECTION, SOLUTION INTRAVENOUS ONCE
Status: CANCELLED | OUTPATIENT
Start: 2019-01-03

## 2019-01-03 RX ADMIN — SODIUM CHLORIDE 250 ML: 9 INJECTION, SOLUTION INTRAVENOUS at 11:28

## 2019-01-03 RX ADMIN — NATALIZUMAB 300 MG: 300 INJECTION INTRAVENOUS at 11:28

## 2019-02-07 ENCOUNTER — APPOINTMENT (OUTPATIENT)
Dept: ONCOLOGY | Facility: HOSPITAL | Age: 44
End: 2019-02-07

## 2019-02-14 ENCOUNTER — INFUSION (OUTPATIENT)
Dept: ONCOLOGY | Facility: HOSPITAL | Age: 44
End: 2019-02-14

## 2019-02-14 VITALS
TEMPERATURE: 97 F | HEART RATE: 70 BPM | RESPIRATION RATE: 18 BRPM | DIASTOLIC BLOOD PRESSURE: 60 MMHG | SYSTOLIC BLOOD PRESSURE: 119 MMHG

## 2019-02-14 DIAGNOSIS — G35 MULTIPLE SCLEROSIS (HCC): Primary | ICD-10-CM

## 2019-02-14 PROCEDURE — 25010000002 NATALIZUMAB PER 1 MG: Performed by: NURSE PRACTITIONER

## 2019-02-14 PROCEDURE — 96365 THER/PROPH/DIAG IV INF INIT: CPT | Performed by: NURSE PRACTITIONER

## 2019-02-14 RX ORDER — SODIUM CHLORIDE 9 MG/ML
250 INJECTION, SOLUTION INTRAVENOUS ONCE
Status: COMPLETED | OUTPATIENT
Start: 2019-02-14 | End: 2019-02-14

## 2019-02-14 RX ORDER — ONDANSETRON 4 MG/1
8 TABLET, FILM COATED ORAL ONCE
Status: CANCELLED
Start: 2019-02-14 | End: 2019-02-14

## 2019-02-14 RX ORDER — ACETAMINOPHEN 325 MG/1
650 TABLET ORAL ONCE
Status: CANCELLED | OUTPATIENT
Start: 2019-02-14

## 2019-02-14 RX ORDER — SODIUM CHLORIDE 9 MG/ML
250 INJECTION, SOLUTION INTRAVENOUS ONCE
Status: CANCELLED | OUTPATIENT
Start: 2019-02-14

## 2019-02-14 RX ORDER — DIPHENHYDRAMINE HYDROCHLORIDE 50 MG/ML
25 INJECTION INTRAMUSCULAR; INTRAVENOUS ONCE
Status: CANCELLED
Start: 2019-02-14 | End: 2019-02-14

## 2019-02-14 RX ORDER — CETIRIZINE HYDROCHLORIDE 10 MG/1
10 TABLET ORAL DAILY
Status: CANCELLED
Start: 2019-02-14

## 2019-02-14 RX ADMIN — SODIUM CHLORIDE 250 ML: 9 INJECTION, SOLUTION INTRAVENOUS at 10:13

## 2019-02-14 RX ADMIN — NATALIZUMAB 300 MG: 300 INJECTION INTRAVENOUS at 10:24

## 2019-02-25 ENCOUNTER — OFFICE VISIT (OUTPATIENT)
Dept: FAMILY MEDICINE CLINIC | Facility: CLINIC | Age: 44
End: 2019-02-25

## 2019-02-25 VITALS
OXYGEN SATURATION: 97 % | HEIGHT: 66 IN | TEMPERATURE: 96.1 F | DIASTOLIC BLOOD PRESSURE: 80 MMHG | WEIGHT: 250 LBS | HEART RATE: 76 BPM | SYSTOLIC BLOOD PRESSURE: 126 MMHG | BODY MASS INDEX: 40.18 KG/M2

## 2019-02-25 DIAGNOSIS — R51.9 SINUS HEADACHE: ICD-10-CM

## 2019-02-25 DIAGNOSIS — J01.90 ACUTE SINUSITIS, RECURRENCE NOT SPECIFIED, UNSPECIFIED LOCATION: Primary | ICD-10-CM

## 2019-02-25 PROCEDURE — 99214 OFFICE O/P EST MOD 30 MIN: CPT | Performed by: NURSE PRACTITIONER

## 2019-02-25 RX ORDER — AMOXICILLIN AND CLAVULANATE POTASSIUM 875; 125 MG/1; MG/1
1 TABLET, FILM COATED ORAL EVERY 12 HOURS SCHEDULED
Qty: 20 TABLET | Refills: 0 | Status: SHIPPED | OUTPATIENT
Start: 2019-02-25 | End: 2019-03-07

## 2019-02-25 RX ORDER — FLUCONAZOLE 150 MG/1
TABLET ORAL
Qty: 2 TABLET | Refills: 0 | Status: SHIPPED | OUTPATIENT
Start: 2019-02-25 | End: 2020-08-20 | Stop reason: SDUPTHER

## 2019-02-25 RX ORDER — FLUTICASONE PROPIONATE 50 MCG
2 SPRAY, SUSPENSION (ML) NASAL DAILY
Qty: 16 G | Refills: 0 | Status: SHIPPED | OUTPATIENT
Start: 2019-02-25 | End: 2021-12-27 | Stop reason: SDUPTHER

## 2019-02-25 NOTE — PROGRESS NOTES
"Subjective   Jenn Good is a 43 y.o. female.     [facility] Visit    PCP: Dr. Salazar    CC: \"sinus infection\"      Sinusitis   This is a new problem. Episode onset: x 2 weeks. The problem has been gradually worsening since onset. There has been no fever. Her pain is at a severity of 7/10 (headaches). Associated symptoms include congestion, ear pain ( right), headaches, sinus pressure ( worse on right side) and a sore throat ( scratchy). Pertinent negatives include no chills, coughing, diaphoresis, hoarse voice, neck pain, shortness of breath, sneezing or swollen glands. Treatments tried: otc sinus meds, migraine meds. The treatment provided no relief.        The following portions of the patient's history were reviewed and updated as appropriate: allergies, current medications, past medical history, past social history, past surgical history and problem list.    Review of Systems   Constitutional: Negative for appetite change, chills, diaphoresis and fever.   HENT: Positive for congestion, ear pain ( right), postnasal drip, rhinorrhea (thick, green), sinus pressure ( worse on right side) and sore throat ( scratchy). Negative for ear discharge, hoarse voice and sneezing.    Eyes: Negative for discharge and itching.   Respiratory: Negative for cough, chest tightness, shortness of breath and wheezing.    Cardiovascular: Negative for chest pain and leg swelling.   Gastrointestinal: Negative for abdominal pain, diarrhea and nausea.   Genitourinary: Negative for difficulty urinating.   Musculoskeletal: Positive for arthralgias (history of MS). Negative for neck pain.   Neurological: Positive for headache. Negative for dizziness.   Hematological: Negative for adenopathy.       Objective    /80 (BP Location: Left arm, Patient Position: Sitting, Cuff Size: Adult)   Pulse 76   Temp 96.1 °F (35.6 °C) (Tympanic)   Ht 167.6 cm (66\")   Wt 113 kg (250 lb)   SpO2 97%   BMI 40.35 kg/m²     Physical Exam "   Constitutional: She is oriented to person, place, and time. She appears well-developed and well-nourished. No distress.   HENT:   Head: Normocephalic and atraumatic.   Right Ear: Ear canal normal. Tympanic membrane is not erythematous ( very dull, diminished light reflex).   Left Ear: Tympanic membrane and ear canal normal.   Nose: Mucosal edema and congestion present. Right sinus exhibits maxillary sinus tenderness and frontal sinus tenderness. Left sinus exhibits no maxillary sinus tenderness and no frontal sinus tenderness.   Mouth/Throat: Uvula is midline and mucous membranes are normal. Posterior oropharyngeal erythema ( minimal erythema, +PND) present. No oropharyngeal exudate.   Eyes: Conjunctivae are normal. Right eye exhibits no discharge. Left eye exhibits no discharge.   Neck: Neck supple.   Cardiovascular: Normal rate and regular rhythm.   Pulmonary/Chest: Effort normal and breath sounds normal. She has no wheezes. She has no rales.   Lymphadenopathy:     She has no cervical adenopathy.   Neurological: She is alert and oriented to person, place, and time.   Nursing note and vitals reviewed.        Assessment/Plan   Jenn was seen today for headache, nasal congestion, facial pain and sore throat.    Diagnoses and all orders for this visit:    Acute sinusitis, recurrence not specified, unspecified location  -     amoxicillin-clavulanate (AUGMENTIN) 875-125 MG per tablet; Take 1 tablet by mouth Every 12 (Twelve) Hours for 10 days.  -     fluticasone (FLONASE) 50 MCG/ACT nasal spray; 2 sprays into the nostril(s) as directed by provider Daily.    Other orders  -     fluconazole (DIFLUCAN) 150 MG tablet; 1 tab po x 1 now, may repeat in 4 days prn yeast    Push fluids  Rest  Rx for Augmentin, Flonase  Tylenol or Motrin as needed for headaches  Cool mist humidifier at night  Continue with all other regular meds as prescribed    See PCP or RTC if symptoms persist/worsen

## 2019-02-25 NOTE — PATIENT INSTRUCTIONS

## 2019-03-14 ENCOUNTER — APPOINTMENT (OUTPATIENT)
Dept: ONCOLOGY | Facility: HOSPITAL | Age: 44
End: 2019-03-14

## 2019-03-14 RX ORDER — SODIUM CHLORIDE 9 MG/ML
250 INJECTION, SOLUTION INTRAVENOUS ONCE
Status: CANCELLED | OUTPATIENT
Start: 2019-03-14

## 2019-03-14 RX ORDER — ACETAMINOPHEN 325 MG/1
650 TABLET ORAL ONCE
Status: CANCELLED | OUTPATIENT
Start: 2019-03-14

## 2019-03-14 RX ORDER — CETIRIZINE HYDROCHLORIDE 5 MG/1
10 TABLET ORAL DAILY
Status: CANCELLED
Start: 2019-03-14

## 2019-03-14 RX ORDER — ONDANSETRON 4 MG/1
4 TABLET, FILM COATED ORAL ONCE
Status: CANCELLED
Start: 2019-03-14 | End: 2019-03-14

## 2019-03-14 RX ORDER — DIPHENHYDRAMINE HYDROCHLORIDE 50 MG/ML
25 INJECTION INTRAMUSCULAR; INTRAVENOUS ONCE
Status: CANCELLED
Start: 2019-03-14 | End: 2019-03-14

## 2019-03-21 ENCOUNTER — INFUSION (OUTPATIENT)
Dept: ONCOLOGY | Facility: HOSPITAL | Age: 44
End: 2019-03-21

## 2019-03-21 VITALS
RESPIRATION RATE: 18 BRPM | DIASTOLIC BLOOD PRESSURE: 80 MMHG | TEMPERATURE: 97.1 F | HEART RATE: 76 BPM | SYSTOLIC BLOOD PRESSURE: 147 MMHG

## 2019-03-21 DIAGNOSIS — G35 MULTIPLE SCLEROSIS (HCC): Primary | ICD-10-CM

## 2019-03-21 PROCEDURE — 25010000002 NATALIZUMAB PER 1 MG: Performed by: NURSE PRACTITIONER

## 2019-03-21 PROCEDURE — A9270 NON-COVERED ITEM OR SERVICE: HCPCS | Performed by: NURSE PRACTITIONER

## 2019-03-21 PROCEDURE — 63710000001 ACETAMINOPHEN 325 MG TABLET: Performed by: NURSE PRACTITIONER

## 2019-03-21 PROCEDURE — 96365 THER/PROPH/DIAG IV INF INIT: CPT | Performed by: NURSE PRACTITIONER

## 2019-03-21 RX ORDER — SODIUM CHLORIDE 9 MG/ML
250 INJECTION, SOLUTION INTRAVENOUS ONCE
Status: COMPLETED | OUTPATIENT
Start: 2019-03-21 | End: 2019-03-21

## 2019-03-21 RX ORDER — ACETAMINOPHEN 325 MG/1
650 TABLET ORAL ONCE
Status: CANCELLED | OUTPATIENT
Start: 2019-03-21

## 2019-03-21 RX ORDER — ACETAMINOPHEN 325 MG/1
650 TABLET ORAL ONCE
Status: COMPLETED | OUTPATIENT
Start: 2019-03-21 | End: 2019-03-21

## 2019-03-21 RX ORDER — ONDANSETRON 4 MG/1
4 TABLET, FILM COATED ORAL ONCE
Status: DISCONTINUED | OUTPATIENT
Start: 2019-03-21 | End: 2019-03-21 | Stop reason: HOSPADM

## 2019-03-21 RX ORDER — CETIRIZINE HYDROCHLORIDE 10 MG/1
10 TABLET ORAL DAILY
Status: DISCONTINUED | OUTPATIENT
Start: 2019-03-21 | End: 2019-03-21 | Stop reason: HOSPADM

## 2019-03-21 RX ORDER — ONDANSETRON 4 MG/1
4 TABLET, FILM COATED ORAL ONCE
Status: CANCELLED
Start: 2019-03-21 | End: 2019-03-21

## 2019-03-21 RX ORDER — DIPHENHYDRAMINE HYDROCHLORIDE 50 MG/ML
25 INJECTION INTRAMUSCULAR; INTRAVENOUS ONCE
Status: CANCELLED
Start: 2019-03-21 | End: 2019-03-21

## 2019-03-21 RX ORDER — CETIRIZINE HYDROCHLORIDE 10 MG/1
10 TABLET ORAL DAILY
Status: CANCELLED
Start: 2019-03-21

## 2019-03-21 RX ORDER — SODIUM CHLORIDE 9 MG/ML
250 INJECTION, SOLUTION INTRAVENOUS ONCE
Status: CANCELLED | OUTPATIENT
Start: 2019-03-21

## 2019-03-21 RX ADMIN — NATALIZUMAB 300 MG: 300 INJECTION INTRAVENOUS at 10:49

## 2019-03-21 RX ADMIN — SODIUM CHLORIDE 250 ML: 9 INJECTION, SOLUTION INTRAVENOUS at 10:28

## 2019-03-21 RX ADMIN — ACETAMINOPHEN 650 MG: 325 TABLET, FILM COATED ORAL at 10:28

## 2019-04-18 ENCOUNTER — INFUSION (OUTPATIENT)
Dept: ONCOLOGY | Facility: HOSPITAL | Age: 44
End: 2019-04-18

## 2019-04-18 VITALS
SYSTOLIC BLOOD PRESSURE: 123 MMHG | DIASTOLIC BLOOD PRESSURE: 67 MMHG | HEART RATE: 78 BPM | RESPIRATION RATE: 18 BRPM | TEMPERATURE: 96.4 F

## 2019-04-18 DIAGNOSIS — G35 MULTIPLE SCLEROSIS (HCC): Primary | ICD-10-CM

## 2019-04-18 PROCEDURE — 63710000001 ONDANSETRON PER 8 MG: Performed by: NURSE PRACTITIONER

## 2019-04-18 PROCEDURE — 25010000002 NATALIZUMAB PER 1 MG: Performed by: NURSE PRACTITIONER

## 2019-04-18 PROCEDURE — 63710000001 CETIRIZINE 10 MG TABLET: Performed by: NURSE PRACTITIONER

## 2019-04-18 PROCEDURE — A9270 NON-COVERED ITEM OR SERVICE: HCPCS | Performed by: NURSE PRACTITIONER

## 2019-04-18 PROCEDURE — 63710000001 ACETAMINOPHEN 325 MG TABLET: Performed by: NURSE PRACTITIONER

## 2019-04-18 PROCEDURE — 96365 THER/PROPH/DIAG IV INF INIT: CPT | Performed by: NURSE PRACTITIONER

## 2019-04-18 RX ORDER — SODIUM CHLORIDE 9 MG/ML
250 INJECTION, SOLUTION INTRAVENOUS ONCE
Status: COMPLETED | OUTPATIENT
Start: 2019-04-18 | End: 2019-04-18

## 2019-04-18 RX ORDER — POTASSIUM CHLORIDE 750 MG/1
10 TABLET, FILM COATED, EXTENDED RELEASE ORAL DAILY
COMMUNITY
End: 2019-07-12 | Stop reason: ALTCHOICE

## 2019-04-18 RX ORDER — ONDANSETRON 4 MG/1
4 TABLET, FILM COATED ORAL ONCE
Status: CANCELLED
Start: 2019-05-16 | End: 2019-04-18

## 2019-04-18 RX ORDER — SODIUM CHLORIDE 9 MG/ML
250 INJECTION, SOLUTION INTRAVENOUS ONCE
Status: CANCELLED | OUTPATIENT
Start: 2019-05-16

## 2019-04-18 RX ORDER — ONDANSETRON 4 MG/1
4 TABLET, FILM COATED ORAL ONCE
Status: COMPLETED | OUTPATIENT
Start: 2019-04-18 | End: 2019-04-18

## 2019-04-18 RX ORDER — ACETAMINOPHEN 325 MG/1
650 TABLET ORAL ONCE
Status: COMPLETED | OUTPATIENT
Start: 2019-04-18 | End: 2019-04-18

## 2019-04-18 RX ORDER — ACETAMINOPHEN 325 MG/1
650 TABLET ORAL ONCE
Status: CANCELLED | OUTPATIENT
Start: 2019-05-16

## 2019-04-18 RX ORDER — DIPHENHYDRAMINE HYDROCHLORIDE 50 MG/ML
25 INJECTION INTRAMUSCULAR; INTRAVENOUS ONCE
Status: CANCELLED
Start: 2019-05-16 | End: 2019-04-18

## 2019-04-18 RX ORDER — CETIRIZINE HYDROCHLORIDE 10 MG/1
10 TABLET ORAL DAILY
Status: CANCELLED
Start: 2019-04-18

## 2019-04-18 RX ORDER — CETIRIZINE HYDROCHLORIDE 10 MG/1
10 TABLET ORAL DAILY
Status: DISCONTINUED | OUTPATIENT
Start: 2019-04-18 | End: 2019-04-18 | Stop reason: HOSPADM

## 2019-04-18 RX ADMIN — NATALIZUMAB 300 MG: 300 INJECTION INTRAVENOUS at 13:42

## 2019-04-18 RX ADMIN — SODIUM CHLORIDE 250 ML: 9 INJECTION, SOLUTION INTRAVENOUS at 13:21

## 2019-04-18 RX ADMIN — ACETAMINOPHEN 650 MG: 325 TABLET, FILM COATED ORAL at 13:21

## 2019-04-18 RX ADMIN — CETIRIZINE HYDROCHLORIDE 10 MG: 10 TABLET, FILM COATED ORAL at 13:28

## 2019-04-18 RX ADMIN — ONDANSETRON 4 MG: 4 TABLET, FILM COATED ORAL at 13:21

## 2019-05-16 ENCOUNTER — INFUSION (OUTPATIENT)
Dept: ONCOLOGY | Facility: HOSPITAL | Age: 44
End: 2019-05-16

## 2019-05-16 VITALS
HEART RATE: 68 BPM | RESPIRATION RATE: 20 BRPM | SYSTOLIC BLOOD PRESSURE: 146 MMHG | TEMPERATURE: 97.8 F | DIASTOLIC BLOOD PRESSURE: 78 MMHG

## 2019-05-16 DIAGNOSIS — G35 MULTIPLE SCLEROSIS (HCC): Primary | ICD-10-CM

## 2019-05-16 PROCEDURE — 25010000002 NATALIZUMAB PER 1 MG: Performed by: NURSE PRACTITIONER

## 2019-05-16 PROCEDURE — 96365 THER/PROPH/DIAG IV INF INIT: CPT | Performed by: NURSE PRACTITIONER

## 2019-05-16 RX ORDER — CETIRIZINE HYDROCHLORIDE 10 MG/1
10 TABLET ORAL DAILY
Status: CANCELLED
Start: 2019-05-16

## 2019-05-16 RX ORDER — ONDANSETRON 4 MG/1
4 TABLET, FILM COATED ORAL ONCE
Status: DISCONTINUED | OUTPATIENT
Start: 2019-05-16 | End: 2019-05-16 | Stop reason: HOSPADM

## 2019-05-16 RX ORDER — SODIUM CHLORIDE 9 MG/ML
250 INJECTION, SOLUTION INTRAVENOUS ONCE
Status: COMPLETED | OUTPATIENT
Start: 2019-05-16 | End: 2019-05-16

## 2019-05-16 RX ORDER — CETIRIZINE HYDROCHLORIDE 10 MG/1
10 TABLET ORAL ONCE
Status: DISCONTINUED | OUTPATIENT
Start: 2019-05-16 | End: 2019-05-16 | Stop reason: HOSPADM

## 2019-05-16 RX ORDER — ACETAMINOPHEN 325 MG/1
650 TABLET ORAL ONCE
Status: CANCELLED | OUTPATIENT
Start: 2019-05-16

## 2019-05-16 RX ORDER — SODIUM CHLORIDE 9 MG/ML
250 INJECTION, SOLUTION INTRAVENOUS ONCE
Status: CANCELLED | OUTPATIENT
Start: 2019-05-16

## 2019-05-16 RX ORDER — ONDANSETRON 4 MG/1
4 TABLET, FILM COATED ORAL ONCE
Status: CANCELLED
Start: 2019-05-16 | End: 2019-05-16

## 2019-05-16 RX ORDER — DIPHENHYDRAMINE HYDROCHLORIDE 50 MG/ML
25 INJECTION INTRAMUSCULAR; INTRAVENOUS ONCE
Status: CANCELLED
Start: 2019-05-16 | End: 2019-05-16

## 2019-05-16 RX ORDER — ACETAMINOPHEN 325 MG/1
650 TABLET ORAL ONCE
Status: DISCONTINUED | OUTPATIENT
Start: 2019-05-16 | End: 2019-05-16 | Stop reason: HOSPADM

## 2019-05-16 RX ADMIN — NATALIZUMAB 300 MG: 300 INJECTION INTRAVENOUS at 13:46

## 2019-05-16 RX ADMIN — SODIUM CHLORIDE 250 ML: 9 INJECTION, SOLUTION INTRAVENOUS at 13:48

## 2019-05-21 ENCOUNTER — TELEPHONE (OUTPATIENT)
Dept: ONCOLOGY | Facility: CLINIC | Age: 44
End: 2019-05-21

## 2019-06-13 ENCOUNTER — APPOINTMENT (OUTPATIENT)
Dept: ONCOLOGY | Facility: HOSPITAL | Age: 44
End: 2019-06-13

## 2019-06-14 ENCOUNTER — INFUSION (OUTPATIENT)
Dept: ONCOLOGY | Facility: HOSPITAL | Age: 44
End: 2019-06-14

## 2019-06-14 VITALS
HEART RATE: 72 BPM | TEMPERATURE: 98.7 F | DIASTOLIC BLOOD PRESSURE: 87 MMHG | SYSTOLIC BLOOD PRESSURE: 160 MMHG | RESPIRATION RATE: 18 BRPM

## 2019-06-14 DIAGNOSIS — G35 MULTIPLE SCLEROSIS (HCC): Primary | ICD-10-CM

## 2019-06-14 PROCEDURE — A9270 NON-COVERED ITEM OR SERVICE: HCPCS | Performed by: NURSE PRACTITIONER

## 2019-06-14 PROCEDURE — 96365 THER/PROPH/DIAG IV INF INIT: CPT | Performed by: INTERNAL MEDICINE

## 2019-06-14 PROCEDURE — 63710000001 ACETAMINOPHEN 325 MG TABLET: Performed by: NURSE PRACTITIONER

## 2019-06-14 PROCEDURE — 25010000002 NATALIZUMAB PER 1 MG: Performed by: NURSE PRACTITIONER

## 2019-06-14 RX ORDER — DIPHENHYDRAMINE HYDROCHLORIDE 50 MG/ML
25 INJECTION INTRAMUSCULAR; INTRAVENOUS ONCE
Status: CANCELLED
Start: 2019-07-12 | End: 2019-06-14

## 2019-06-14 RX ORDER — CETIRIZINE HYDROCHLORIDE 10 MG/1
10 TABLET ORAL DAILY
Status: CANCELLED
Start: 2019-06-14

## 2019-06-14 RX ORDER — SODIUM CHLORIDE 9 MG/ML
250 INJECTION, SOLUTION INTRAVENOUS ONCE
Status: CANCELLED | OUTPATIENT
Start: 2019-07-12

## 2019-06-14 RX ORDER — SODIUM CHLORIDE 9 MG/ML
250 INJECTION, SOLUTION INTRAVENOUS ONCE
Status: COMPLETED | OUTPATIENT
Start: 2019-06-14 | End: 2019-06-14

## 2019-06-14 RX ORDER — ACETAMINOPHEN 325 MG/1
650 TABLET ORAL ONCE
Status: COMPLETED | OUTPATIENT
Start: 2019-06-14 | End: 2019-06-14

## 2019-06-14 RX ORDER — ONDANSETRON 4 MG/1
4 TABLET, FILM COATED ORAL ONCE
Status: CANCELLED
Start: 2019-07-12 | End: 2019-06-14

## 2019-06-14 RX ORDER — ACETAMINOPHEN 325 MG/1
650 TABLET ORAL ONCE
Status: CANCELLED | OUTPATIENT
Start: 2019-07-12

## 2019-06-14 RX ADMIN — NATALIZUMAB 300 MG: 300 INJECTION INTRAVENOUS at 09:39

## 2019-06-14 RX ADMIN — ACETAMINOPHEN 650 MG: 325 TABLET, FILM COATED ORAL at 09:22

## 2019-06-14 RX ADMIN — SODIUM CHLORIDE 250 ML: 9 INJECTION, SOLUTION INTRAVENOUS at 09:22

## 2019-07-12 ENCOUNTER — INFUSION (OUTPATIENT)
Dept: ONCOLOGY | Facility: HOSPITAL | Age: 44
End: 2019-07-12

## 2019-07-12 VITALS
TEMPERATURE: 97.4 F | SYSTOLIC BLOOD PRESSURE: 147 MMHG | DIASTOLIC BLOOD PRESSURE: 77 MMHG | RESPIRATION RATE: 18 BRPM | HEART RATE: 65 BPM

## 2019-07-12 DIAGNOSIS — G35 MULTIPLE SCLEROSIS (HCC): Primary | ICD-10-CM

## 2019-07-12 PROCEDURE — 25010000002 NATALIZUMAB PER 1 MG: Performed by: NURSE PRACTITIONER

## 2019-07-12 PROCEDURE — 96365 THER/PROPH/DIAG IV INF INIT: CPT | Performed by: NURSE PRACTITIONER

## 2019-07-12 RX ORDER — ONDANSETRON 4 MG/1
4 TABLET, FILM COATED ORAL ONCE
Status: CANCELLED
Start: 2019-07-12 | End: 2019-07-12

## 2019-07-12 RX ORDER — SODIUM CHLORIDE 9 MG/ML
250 INJECTION, SOLUTION INTRAVENOUS ONCE
Status: CANCELLED | OUTPATIENT
Start: 2019-07-12

## 2019-07-12 RX ORDER — SODIUM CHLORIDE 9 MG/ML
250 INJECTION, SOLUTION INTRAVENOUS ONCE
Status: COMPLETED | OUTPATIENT
Start: 2019-07-12 | End: 2019-07-12

## 2019-07-12 RX ORDER — ONDANSETRON 4 MG/1
4 TABLET, FILM COATED ORAL ONCE
Status: DISCONTINUED | OUTPATIENT
Start: 2019-07-12 | End: 2019-07-12 | Stop reason: HOSPADM

## 2019-07-12 RX ORDER — ACETAMINOPHEN 325 MG/1
650 TABLET ORAL ONCE
Status: DISCONTINUED | OUTPATIENT
Start: 2019-07-12 | End: 2019-07-12 | Stop reason: HOSPADM

## 2019-07-12 RX ORDER — CETIRIZINE HYDROCHLORIDE 10 MG/1
10 TABLET ORAL ONCE
Status: DISCONTINUED | OUTPATIENT
Start: 2019-07-12 | End: 2019-07-12 | Stop reason: HOSPADM

## 2019-07-12 RX ORDER — DIPHENHYDRAMINE HYDROCHLORIDE 50 MG/ML
25 INJECTION INTRAMUSCULAR; INTRAVENOUS ONCE
Status: CANCELLED
Start: 2019-07-12 | End: 2019-07-12

## 2019-07-12 RX ORDER — TELMISARTAN 40 MG/1
40 TABLET ORAL DAILY
Refills: 3 | COMMUNITY
Start: 2019-07-08

## 2019-07-12 RX ORDER — CETIRIZINE HYDROCHLORIDE 10 MG/1
10 TABLET ORAL DAILY
Status: CANCELLED
Start: 2019-07-12

## 2019-07-12 RX ORDER — ACETAMINOPHEN 325 MG/1
650 TABLET ORAL ONCE
Status: CANCELLED | OUTPATIENT
Start: 2019-07-12

## 2019-07-12 RX ADMIN — NATALIZUMAB 300 MG: 300 INJECTION INTRAVENOUS at 09:47

## 2019-07-12 RX ADMIN — SODIUM CHLORIDE 250 ML: 9 INJECTION, SOLUTION INTRAVENOUS at 09:44

## 2019-08-09 ENCOUNTER — INFUSION (OUTPATIENT)
Dept: ONCOLOGY | Facility: HOSPITAL | Age: 44
End: 2019-08-09

## 2019-08-09 VITALS
DIASTOLIC BLOOD PRESSURE: 73 MMHG | HEART RATE: 77 BPM | RESPIRATION RATE: 20 BRPM | SYSTOLIC BLOOD PRESSURE: 145 MMHG | TEMPERATURE: 97.8 F

## 2019-08-09 DIAGNOSIS — G35 MULTIPLE SCLEROSIS (HCC): Primary | ICD-10-CM

## 2019-08-09 PROCEDURE — 96365 THER/PROPH/DIAG IV INF INIT: CPT | Performed by: NURSE PRACTITIONER

## 2019-08-09 PROCEDURE — 25010000002 NATALIZUMAB PER 1 MG: Performed by: NURSE PRACTITIONER

## 2019-08-09 RX ORDER — SODIUM CHLORIDE 9 MG/ML
250 INJECTION, SOLUTION INTRAVENOUS ONCE
Status: CANCELLED | OUTPATIENT
Start: 2019-08-09

## 2019-08-09 RX ORDER — CETIRIZINE HYDROCHLORIDE 10 MG/1
10 TABLET ORAL DAILY
Status: CANCELLED
Start: 2019-08-09

## 2019-08-09 RX ORDER — ACETAMINOPHEN 325 MG/1
650 TABLET ORAL ONCE
Status: DISCONTINUED | OUTPATIENT
Start: 2019-08-09 | End: 2019-08-09 | Stop reason: HOSPADM

## 2019-08-09 RX ORDER — ONDANSETRON 4 MG/1
4 TABLET, FILM COATED ORAL ONCE
Status: DISCONTINUED | OUTPATIENT
Start: 2019-08-09 | End: 2019-08-09 | Stop reason: HOSPADM

## 2019-08-09 RX ORDER — CETIRIZINE HYDROCHLORIDE 10 MG/1
10 TABLET ORAL ONCE
Status: DISCONTINUED | OUTPATIENT
Start: 2019-08-09 | End: 2019-08-09 | Stop reason: HOSPADM

## 2019-08-09 RX ORDER — ONDANSETRON 4 MG/1
4 TABLET, FILM COATED ORAL ONCE
Status: CANCELLED
Start: 2019-08-09 | End: 2019-08-09

## 2019-08-09 RX ORDER — SODIUM CHLORIDE 9 MG/ML
250 INJECTION, SOLUTION INTRAVENOUS ONCE
Status: COMPLETED | OUTPATIENT
Start: 2019-08-09 | End: 2019-08-09

## 2019-08-09 RX ORDER — ACETAMINOPHEN 325 MG/1
650 TABLET ORAL ONCE
Status: CANCELLED | OUTPATIENT
Start: 2019-08-09

## 2019-08-09 RX ORDER — DIPHENHYDRAMINE HYDROCHLORIDE 50 MG/ML
25 INJECTION INTRAMUSCULAR; INTRAVENOUS ONCE
Status: CANCELLED
Start: 2019-08-09 | End: 2019-08-09

## 2019-08-09 RX ADMIN — NATALIZUMAB 300 MG: 300 INJECTION INTRAVENOUS at 10:31

## 2019-08-09 RX ADMIN — SODIUM CHLORIDE 250 ML: 9 INJECTION, SOLUTION INTRAVENOUS at 10:09

## 2019-08-29 RX ORDER — SODIUM CHLORIDE 9 MG/ML
250 INJECTION, SOLUTION INTRAVENOUS ONCE
Status: CANCELLED | OUTPATIENT
Start: 2019-08-29

## 2019-08-29 RX ORDER — ONDANSETRON 4 MG/1
4 TABLET, ORALLY DISINTEGRATING ORAL ONCE
Status: CANCELLED
Start: 2019-08-29 | End: 2019-08-29

## 2019-08-29 RX ORDER — ACETAMINOPHEN 325 MG/1
650 TABLET ORAL ONCE
Status: CANCELLED | OUTPATIENT
Start: 2019-08-29

## 2019-08-29 RX ORDER — LORATADINE 10 MG/1
10 TABLET ORAL DAILY
Status: CANCELLED
Start: 2019-08-29

## 2019-08-29 RX ORDER — DIPHENHYDRAMINE HCL 25 MG
25 CAPSULE ORAL ONCE
Status: CANCELLED | OUTPATIENT
Start: 2019-08-29

## 2019-09-06 ENCOUNTER — INFUSION (OUTPATIENT)
Dept: ONCOLOGY | Facility: HOSPITAL | Age: 44
End: 2019-09-06

## 2019-09-06 VITALS
DIASTOLIC BLOOD PRESSURE: 74 MMHG | HEART RATE: 72 BPM | SYSTOLIC BLOOD PRESSURE: 133 MMHG | TEMPERATURE: 96.8 F | RESPIRATION RATE: 18 BRPM

## 2019-09-06 DIAGNOSIS — G35 MULTIPLE SCLEROSIS (HCC): Primary | ICD-10-CM

## 2019-09-06 PROCEDURE — 96365 THER/PROPH/DIAG IV INF INIT: CPT | Performed by: NURSE PRACTITIONER

## 2019-09-06 PROCEDURE — 25010000002 NATALIZUMAB PER 1 MG: Performed by: NURSE PRACTITIONER

## 2019-09-06 RX ORDER — SODIUM CHLORIDE 9 MG/ML
250 INJECTION, SOLUTION INTRAVENOUS ONCE
Status: COMPLETED | OUTPATIENT
Start: 2019-09-06 | End: 2019-09-06

## 2019-09-06 RX ORDER — DIPHENHYDRAMINE HCL 25 MG
25 CAPSULE ORAL ONCE
Status: DISCONTINUED | OUTPATIENT
Start: 2019-09-06 | End: 2019-09-06 | Stop reason: HOSPADM

## 2019-09-06 RX ORDER — ACETAMINOPHEN 325 MG/1
650 TABLET ORAL ONCE
Status: CANCELLED | OUTPATIENT
Start: 2019-09-06

## 2019-09-06 RX ORDER — LORATADINE 10 MG/1
10 TABLET ORAL DAILY
Status: CANCELLED
Start: 2019-09-06

## 2019-09-06 RX ORDER — SODIUM CHLORIDE 9 MG/ML
250 INJECTION, SOLUTION INTRAVENOUS ONCE
Status: CANCELLED | OUTPATIENT
Start: 2019-09-06

## 2019-09-06 RX ORDER — ONDANSETRON 4 MG/1
4 TABLET, ORALLY DISINTEGRATING ORAL ONCE
Status: CANCELLED
Start: 2019-09-06 | End: 2019-09-06

## 2019-09-06 RX ORDER — ACETAMINOPHEN 325 MG/1
650 TABLET ORAL ONCE
Status: DISCONTINUED | OUTPATIENT
Start: 2019-09-06 | End: 2019-09-06 | Stop reason: HOSPADM

## 2019-09-06 RX ORDER — DIPHENHYDRAMINE HCL 25 MG
25 CAPSULE ORAL ONCE
Status: CANCELLED | OUTPATIENT
Start: 2019-09-06

## 2019-09-06 RX ADMIN — SODIUM CHLORIDE 250 ML: 9 INJECTION, SOLUTION INTRAVENOUS at 10:20

## 2019-09-06 RX ADMIN — NATALIZUMAB 300 MG: 300 INJECTION INTRAVENOUS at 10:20

## 2019-10-04 ENCOUNTER — INFUSION (OUTPATIENT)
Dept: ONCOLOGY | Facility: HOSPITAL | Age: 44
End: 2019-10-04

## 2019-10-04 VITALS
SYSTOLIC BLOOD PRESSURE: 136 MMHG | HEART RATE: 93 BPM | TEMPERATURE: 97.3 F | RESPIRATION RATE: 20 BRPM | DIASTOLIC BLOOD PRESSURE: 77 MMHG

## 2019-10-04 DIAGNOSIS — G35 MULTIPLE SCLEROSIS (HCC): Primary | ICD-10-CM

## 2019-10-04 LAB
ALBUMIN SERPL-MCNC: 3.7 G/DL (ref 3.5–5.2)
ALP SERPL-CCNC: 107 U/L (ref 39–117)
ALT SERPL W P-5'-P-CCNC: 11 U/L (ref 1–33)
AST SERPL-CCNC: 18 U/L (ref 1–32)
BASOPHILS # BLD AUTO: 0.09 10*3/MM3 (ref 0–0.2)
BASOPHILS NFR BLD AUTO: 1.1 % (ref 0–1.5)
BILIRUB CONJ SERPL-MCNC: <0.2 MG/DL (ref 0.2–0.3)
BILIRUB INDIRECT SERPL-MCNC: ABNORMAL MG/DL
BILIRUB SERPL-MCNC: 0.2 MG/DL (ref 0.2–1.2)
DEPRECATED RDW RBC AUTO: 51.2 FL (ref 37–54)
EOSINOPHIL # BLD AUTO: 0.31 10*3/MM3 (ref 0–0.4)
EOSINOPHIL NFR BLD AUTO: 3.7 % (ref 0.3–6.2)
ERYTHROCYTE [DISTWIDTH] IN BLOOD BY AUTOMATED COUNT: 18.2 % (ref 12.3–15.4)
HCT VFR BLD AUTO: 34.7 % (ref 34–46.6)
HGB BLD-MCNC: 10.9 G/DL (ref 12–15.9)
IMM GRANULOCYTES # BLD AUTO: 0.03 10*3/MM3 (ref 0–0.05)
IMM GRANULOCYTES NFR BLD AUTO: 0.4 % (ref 0–0.5)
LYMPHOCYTES # BLD AUTO: 3.61 10*3/MM3 (ref 0.7–3.1)
LYMPHOCYTES NFR BLD AUTO: 43.7 % (ref 19.6–45.3)
MCH RBC QN AUTO: 24.6 PG (ref 26.6–33)
MCHC RBC AUTO-ENTMCNC: 31.4 G/DL (ref 31.5–35.7)
MCV RBC AUTO: 78.3 FL (ref 79–97)
MONOCYTES # BLD AUTO: 0.57 10*3/MM3 (ref 0.1–0.9)
MONOCYTES NFR BLD AUTO: 6.9 % (ref 5–12)
NEUTROPHILS # BLD AUTO: 3.66 10*3/MM3 (ref 1.7–7)
NEUTROPHILS NFR BLD AUTO: 44.2 % (ref 42.7–76)
NRBC BLD AUTO-RTO: 1.2 /100 WBC (ref 0–0.2)
PLATELET # BLD AUTO: 306 10*3/MM3 (ref 140–450)
PMV BLD AUTO: 10.4 FL (ref 6–12)
PROT SERPL-MCNC: 7 G/DL (ref 6–8.5)
RBC # BLD AUTO: 4.43 10*6/MM3 (ref 3.77–5.28)
WBC NRBC COR # BLD: 8.27 10*3/MM3 (ref 3.4–10.8)

## 2019-10-04 PROCEDURE — 25010000002 NATALIZUMAB PER 1 MG: Performed by: NURSE PRACTITIONER

## 2019-10-04 PROCEDURE — 85025 COMPLETE CBC W/AUTO DIFF WBC: CPT

## 2019-10-04 PROCEDURE — 80076 HEPATIC FUNCTION PANEL: CPT

## 2019-10-04 PROCEDURE — 96365 THER/PROPH/DIAG IV INF INIT: CPT | Performed by: NURSE PRACTITIONER

## 2019-10-04 PROCEDURE — 36415 COLL VENOUS BLD VENIPUNCTURE: CPT | Performed by: NURSE PRACTITIONER

## 2019-10-04 RX ORDER — ONDANSETRON 4 MG/1
4 TABLET, ORALLY DISINTEGRATING ORAL ONCE
Status: CANCELLED
Start: 2019-10-04 | End: 2019-10-04

## 2019-10-04 RX ORDER — ONDANSETRON 4 MG/1
4 TABLET, ORALLY DISINTEGRATING ORAL ONCE
Status: DISCONTINUED | OUTPATIENT
Start: 2019-10-04 | End: 2019-10-04 | Stop reason: HOSPADM

## 2019-10-04 RX ORDER — LORATADINE 10 MG/1
10 TABLET ORAL DAILY
Status: CANCELLED
Start: 2019-10-04

## 2019-10-04 RX ORDER — DIPHENHYDRAMINE HCL 25 MG
25 CAPSULE ORAL ONCE
Status: CANCELLED | OUTPATIENT
Start: 2019-10-04

## 2019-10-04 RX ORDER — SODIUM CHLORIDE 9 MG/ML
250 INJECTION, SOLUTION INTRAVENOUS ONCE
Status: COMPLETED | OUTPATIENT
Start: 2019-10-04 | End: 2019-10-04

## 2019-10-04 RX ORDER — ACETAMINOPHEN 325 MG/1
650 TABLET ORAL ONCE
Status: DISCONTINUED | OUTPATIENT
Start: 2019-10-04 | End: 2019-10-04 | Stop reason: HOSPADM

## 2019-10-04 RX ORDER — ACETAMINOPHEN 325 MG/1
650 TABLET ORAL ONCE
Status: CANCELLED | OUTPATIENT
Start: 2019-11-01

## 2019-10-04 RX ORDER — SODIUM CHLORIDE 9 MG/ML
250 INJECTION, SOLUTION INTRAVENOUS ONCE
Status: CANCELLED | OUTPATIENT
Start: 2019-11-01

## 2019-10-04 RX ORDER — DIPHENHYDRAMINE HCL 25 MG
25 CAPSULE ORAL ONCE
Status: CANCELLED | OUTPATIENT
Start: 2019-11-01

## 2019-10-04 RX ORDER — LORATADINE 10 MG/1
10 TABLET ORAL ONCE
Status: DISCONTINUED | OUTPATIENT
Start: 2019-10-04 | End: 2019-10-04 | Stop reason: HOSPADM

## 2019-10-04 RX ADMIN — NATALIZUMAB 300 MG: 300 INJECTION INTRAVENOUS at 10:32

## 2019-10-04 RX ADMIN — SODIUM CHLORIDE 250 ML: 9 INJECTION, SOLUTION INTRAVENOUS at 10:32

## 2019-11-01 ENCOUNTER — INFUSION (OUTPATIENT)
Dept: ONCOLOGY | Facility: HOSPITAL | Age: 44
End: 2019-11-01

## 2019-11-01 VITALS
HEART RATE: 72 BPM | TEMPERATURE: 97 F | RESPIRATION RATE: 18 BRPM | SYSTOLIC BLOOD PRESSURE: 149 MMHG | DIASTOLIC BLOOD PRESSURE: 59 MMHG

## 2019-11-01 DIAGNOSIS — G35 MULTIPLE SCLEROSIS (HCC): Primary | ICD-10-CM

## 2019-11-01 PROCEDURE — 96365 THER/PROPH/DIAG IV INF INIT: CPT | Performed by: NURSE PRACTITIONER

## 2019-11-01 PROCEDURE — 25010000002 NATALIZUMAB PER 1 MG: Performed by: NURSE PRACTITIONER

## 2019-11-01 RX ORDER — LORATADINE 10 MG/1
10 TABLET ORAL DAILY
Status: CANCELLED
Start: 2019-11-01

## 2019-11-01 RX ORDER — SODIUM CHLORIDE 9 MG/ML
250 INJECTION, SOLUTION INTRAVENOUS ONCE
Status: CANCELLED | OUTPATIENT
Start: 2019-11-01

## 2019-11-01 RX ORDER — LORATADINE 10 MG/1
10 TABLET ORAL DAILY
Status: DISCONTINUED | OUTPATIENT
Start: 2019-11-01 | End: 2019-11-01 | Stop reason: HOSPADM

## 2019-11-01 RX ORDER — ACETAMINOPHEN 325 MG/1
650 TABLET ORAL ONCE
Status: COMPLETED | OUTPATIENT
Start: 2019-11-01 | End: 2019-11-01

## 2019-11-01 RX ORDER — DIPHENHYDRAMINE HCL 25 MG
25 CAPSULE ORAL ONCE
Status: CANCELLED | OUTPATIENT
Start: 2019-11-01

## 2019-11-01 RX ORDER — SODIUM CHLORIDE 9 MG/ML
250 INJECTION, SOLUTION INTRAVENOUS ONCE
Status: COMPLETED | OUTPATIENT
Start: 2019-11-01 | End: 2019-11-01

## 2019-11-01 RX ORDER — ONDANSETRON 4 MG/1
4 TABLET, ORALLY DISINTEGRATING ORAL ONCE
Status: CANCELLED
Start: 2019-11-01 | End: 2019-11-01

## 2019-11-01 RX ORDER — ONDANSETRON 4 MG/1
4 TABLET, ORALLY DISINTEGRATING ORAL ONCE
Status: DISCONTINUED | OUTPATIENT
Start: 2019-11-01 | End: 2019-11-01 | Stop reason: HOSPADM

## 2019-11-01 RX ORDER — ACETAMINOPHEN 325 MG/1
650 TABLET ORAL ONCE
Status: CANCELLED | OUTPATIENT
Start: 2019-11-01

## 2019-11-01 RX ADMIN — SODIUM CHLORIDE 250 ML: 9 INJECTION, SOLUTION INTRAVENOUS at 10:21

## 2019-11-01 RX ADMIN — ACETAMINOPHEN 650 MG: 325 TABLET, FILM COATED ORAL at 10:21

## 2019-11-01 RX ADMIN — NATALIZUMAB 300 MG: 300 INJECTION INTRAVENOUS at 10:46

## 2019-12-02 ENCOUNTER — APPOINTMENT (OUTPATIENT)
Dept: ONCOLOGY | Facility: HOSPITAL | Age: 44
End: 2019-12-02

## 2019-12-09 ENCOUNTER — INFUSION (OUTPATIENT)
Dept: ONCOLOGY | Facility: HOSPITAL | Age: 44
End: 2019-12-09

## 2019-12-09 ENCOUNTER — APPOINTMENT (OUTPATIENT)
Dept: ONCOLOGY | Facility: HOSPITAL | Age: 44
End: 2019-12-09

## 2019-12-09 VITALS
TEMPERATURE: 97.8 F | HEART RATE: 74 BPM | WEIGHT: 250 LBS | RESPIRATION RATE: 18 BRPM | SYSTOLIC BLOOD PRESSURE: 136 MMHG | BODY MASS INDEX: 40.35 KG/M2 | DIASTOLIC BLOOD PRESSURE: 86 MMHG

## 2019-12-09 DIAGNOSIS — G35 MULTIPLE SCLEROSIS (HCC): Primary | ICD-10-CM

## 2019-12-09 PROCEDURE — 96365 THER/PROPH/DIAG IV INF INIT: CPT | Performed by: NURSE PRACTITIONER

## 2019-12-09 PROCEDURE — 25010000002 NATALIZUMAB PER 1 MG: Performed by: NURSE PRACTITIONER

## 2019-12-09 RX ORDER — LORATADINE 10 MG/1
10 TABLET ORAL DAILY
Status: DISCONTINUED | OUTPATIENT
Start: 2019-12-09 | End: 2019-12-09

## 2019-12-09 RX ORDER — ACETAMINOPHEN 325 MG/1
650 TABLET ORAL ONCE
Status: COMPLETED | OUTPATIENT
Start: 2019-12-09 | End: 2019-12-09

## 2019-12-09 RX ORDER — DIPHENHYDRAMINE HCL 25 MG
25 CAPSULE ORAL ONCE
Status: CANCELLED | OUTPATIENT
Start: 2019-12-27

## 2019-12-09 RX ORDER — ONDANSETRON 4 MG/1
4 TABLET, ORALLY DISINTEGRATING ORAL ONCE
Status: DISCONTINUED | OUTPATIENT
Start: 2019-12-09 | End: 2019-12-09

## 2019-12-09 RX ORDER — ACETAMINOPHEN 325 MG/1
650 TABLET ORAL ONCE
Status: CANCELLED | OUTPATIENT
Start: 2019-12-27

## 2019-12-09 RX ORDER — ONDANSETRON 4 MG/1
4 TABLET, ORALLY DISINTEGRATING ORAL ONCE
Status: CANCELLED
Start: 2019-12-27

## 2019-12-09 RX ORDER — SODIUM CHLORIDE 9 MG/ML
250 INJECTION, SOLUTION INTRAVENOUS ONCE
Status: COMPLETED | OUTPATIENT
Start: 2019-12-09 | End: 2019-12-09

## 2019-12-09 RX ORDER — SODIUM CHLORIDE 9 MG/ML
250 INJECTION, SOLUTION INTRAVENOUS ONCE
Status: CANCELLED | OUTPATIENT
Start: 2019-12-27

## 2019-12-09 RX ORDER — LORATADINE 10 MG/1
10 TABLET ORAL DAILY
Status: CANCELLED
Start: 2019-12-27

## 2019-12-09 RX ADMIN — NATALIZUMAB 300 MG: 300 INJECTION INTRAVENOUS at 14:07

## 2019-12-09 RX ADMIN — SODIUM CHLORIDE 250 ML: 9 INJECTION, SOLUTION INTRAVENOUS at 13:43

## 2019-12-09 RX ADMIN — ACETAMINOPHEN 650 MG: 325 TABLET, FILM COATED ORAL at 13:43

## 2019-12-14 ENCOUNTER — HOSPITAL ENCOUNTER (EMERGENCY)
Facility: HOSPITAL | Age: 44
Discharge: HOME | End: 2019-12-14
Attending: FAMILY MEDICINE | Admitting: FAMILY MEDICINE

## 2019-12-14 VITALS
HEART RATE: 85 BPM | RESPIRATION RATE: 18 BRPM | OXYGEN SATURATION: 96 % | WEIGHT: 262.8 LBS | HEIGHT: 66 IN | DIASTOLIC BLOOD PRESSURE: 66 MMHG | SYSTOLIC BLOOD PRESSURE: 126 MMHG | BODY MASS INDEX: 42.23 KG/M2 | TEMPERATURE: 97.7 F

## 2019-12-14 DIAGNOSIS — K59.00 CONSTIPATION, UNSPECIFIED CONSTIPATION TYPE: Primary | ICD-10-CM

## 2019-12-14 DIAGNOSIS — K64.4 EXTERNAL BLEEDING HEMORRHOIDS: ICD-10-CM

## 2019-12-14 LAB
ALBUMIN SERPL-MCNC: 3.8 G/DL (ref 3.5–5.2)
ALBUMIN/GLOB SERPL: 1.2 G/DL
ALP SERPL-CCNC: 122 U/L (ref 39–117)
ALT SERPL W P-5'-P-CCNC: 22 U/L (ref 1–33)
ANION GAP SERPL CALCULATED.3IONS-SCNC: 10 MMOL/L (ref 5–15)
AST SERPL-CCNC: 28 U/L (ref 1–32)
BASOPHILS # BLD AUTO: 0.08 10*3/MM3 (ref 0–0.2)
BASOPHILS NFR BLD AUTO: 0.9 % (ref 0–1.5)
BILIRUB SERPL-MCNC: 0.2 MG/DL (ref 0.2–1.2)
BUN BLD-MCNC: 16 MG/DL (ref 6–20)
BUN/CREAT SERPL: 28.1 (ref 7–25)
CALCIUM SPEC-SCNC: 8.5 MG/DL (ref 8.6–10.5)
CHLORIDE SERPL-SCNC: 108 MMOL/L (ref 98–107)
CO2 SERPL-SCNC: 24 MMOL/L (ref 22–29)
CREAT BLD-MCNC: 0.57 MG/DL (ref 0.57–1)
DEPRECATED RDW RBC AUTO: 46.4 FL (ref 37–54)
EOSINOPHIL # BLD AUTO: 0.22 10*3/MM3 (ref 0–0.4)
EOSINOPHIL NFR BLD AUTO: 2.4 % (ref 0.3–6.2)
ERYTHROCYTE [DISTWIDTH] IN BLOOD BY AUTOMATED COUNT: 16.3 % (ref 12.3–15.4)
GFR SERPL CREATININE-BSD FRML MDRD: 141 ML/MIN/1.73
GLOBULIN UR ELPH-MCNC: 3.2 GM/DL
GLUCOSE BLD-MCNC: 105 MG/DL (ref 65–99)
HCT VFR BLD AUTO: 33.5 % (ref 34–46.6)
HGB BLD-MCNC: 10.3 G/DL (ref 12–15.9)
HOLD SPECIMEN: NORMAL
IMM GRANULOCYTES # BLD AUTO: 0.03 10*3/MM3 (ref 0–0.05)
IMM GRANULOCYTES NFR BLD AUTO: 0.3 % (ref 0–0.5)
LYMPHOCYTES # BLD AUTO: 3.1 10*3/MM3 (ref 0.7–3.1)
LYMPHOCYTES NFR BLD AUTO: 33.3 % (ref 19.6–45.3)
MCH RBC QN AUTO: 24.2 PG (ref 26.6–33)
MCHC RBC AUTO-ENTMCNC: 30.7 G/DL (ref 31.5–35.7)
MCV RBC AUTO: 78.8 FL (ref 79–97)
MONOCYTES # BLD AUTO: 0.62 10*3/MM3 (ref 0.1–0.9)
MONOCYTES NFR BLD AUTO: 6.7 % (ref 5–12)
NEUTROPHILS # BLD AUTO: 5.27 10*3/MM3 (ref 1.7–7)
NEUTROPHILS NFR BLD AUTO: 56.4 % (ref 42.7–76)
NRBC BLD AUTO-RTO: 0.4 /100 WBC (ref 0–0.2)
PLATELET # BLD AUTO: 293 10*3/MM3 (ref 140–450)
PMV BLD AUTO: 10.5 FL (ref 6–12)
POTASSIUM BLD-SCNC: 3.7 MMOL/L (ref 3.5–5.2)
PROT SERPL-MCNC: 7 G/DL (ref 6–8.5)
RBC # BLD AUTO: 4.25 10*6/MM3 (ref 3.77–5.28)
SODIUM BLD-SCNC: 142 MMOL/L (ref 136–145)
WBC NRBC COR # BLD: 9.32 10*3/MM3 (ref 3.4–10.8)
WHOLE BLOOD HOLD SPECIMEN: NORMAL

## 2019-12-14 PROCEDURE — 96375 TX/PRO/DX INJ NEW DRUG ADDON: CPT

## 2019-12-14 PROCEDURE — 80053 COMPREHEN METABOLIC PANEL: CPT | Performed by: NURSE PRACTITIONER

## 2019-12-14 PROCEDURE — 96374 THER/PROPH/DIAG INJ IV PUSH: CPT

## 2019-12-14 PROCEDURE — 25010000002 MORPHINE PER 10 MG: Performed by: FAMILY MEDICINE

## 2019-12-14 PROCEDURE — 25010000002 ONDANSETRON PER 1 MG: Performed by: FAMILY MEDICINE

## 2019-12-14 PROCEDURE — 99284 EMERGENCY DEPT VISIT MOD MDM: CPT

## 2019-12-14 PROCEDURE — 85025 COMPLETE CBC W/AUTO DIFF WBC: CPT | Performed by: NURSE PRACTITIONER

## 2019-12-14 RX ORDER — MORPHINE SULFATE 2 MG/ML
2 INJECTION, SOLUTION INTRAMUSCULAR; INTRAVENOUS ONCE
Status: COMPLETED | OUTPATIENT
Start: 2019-12-14 | End: 2019-12-14

## 2019-12-14 RX ORDER — HYDROCORTISONE ACETATE 25 MG/1
25 SUPPOSITORY RECTAL 2 TIMES DAILY PRN
Qty: 10 SUPPOSITORY | Refills: 0 | Status: SHIPPED | OUTPATIENT
Start: 2019-12-14 | End: 2019-12-19

## 2019-12-14 RX ORDER — DOCUSATE SODIUM 100 MG/1
100 CAPSULE, LIQUID FILLED ORAL 2 TIMES DAILY
Qty: 60 CAPSULE | Refills: 0 | Status: SHIPPED | OUTPATIENT
Start: 2019-12-14 | End: 2021-08-03

## 2019-12-14 RX ORDER — SODIUM CHLORIDE 0.9 % (FLUSH) 0.9 %
10 SYRINGE (ML) INJECTION AS NEEDED
Status: DISCONTINUED | OUTPATIENT
Start: 2019-12-14 | End: 2019-12-14 | Stop reason: HOSPADM

## 2019-12-14 RX ORDER — ONDANSETRON 2 MG/ML
4 INJECTION INTRAMUSCULAR; INTRAVENOUS ONCE
Status: COMPLETED | OUTPATIENT
Start: 2019-12-14 | End: 2019-12-14

## 2019-12-14 RX ADMIN — MORPHINE SULFATE 2 MG: 2 INJECTION, SOLUTION INTRAMUSCULAR; INTRAVENOUS at 15:41

## 2019-12-14 RX ADMIN — ONDANSETRON 4 MG: 2 INJECTION INTRAMUSCULAR; INTRAVENOUS at 15:41

## 2019-12-14 NOTE — ED NOTES
This RN at bedside during rectal exam with [place] nurse practitioner student.     Gabriela Starks, NIVIA  12/14/19 [id number]       Gabriela Starks, RN  12/14/19 [id number]

## 2019-12-14 NOTE — ED PROVIDER NOTES
Subjective   43 yo female presents to the ER with 1 day history of rectal bleeding and pain. Reports loose stool x 1 yesterday but recent constipation. Bleeding started today with her needing to change undergarments once. Denies past history of bleeding or colon problems. Pain 8/10 and constant. Relieved when she lays on her side.           Review of Systems   Constitutional: Negative for fatigue and fever.   HENT: Negative for congestion.    Respiratory: Negative for shortness of breath.    Cardiovascular: Negative for chest pain and palpitations.   Gastrointestinal: Positive for anal bleeding, constipation and rectal pain.   Genitourinary: Negative for difficulty urinating.   Musculoskeletal: Negative for back pain.        Multiple Sclerosis and receives infusion monthly at [facility]   Skin: Negative for wound.   Allergic/Immunologic: Negative for immunocompromised state.   Neurological: Negative for weakness.   Hematological: Negative for adenopathy.   Psychiatric/Behavioral: Negative for confusion.   All other systems reviewed and are negative.      Past Medical History:   Diagnosis Date   • Abdominal pain, right upper quadrant    • Acid reflux    • Acute maxillary sinusitis    • Acute otitis media, left    • Acute pharyngitis    • Acute sinusitis    • Acute upper respiratory infection    • Allergic rhinitis    • Anxiety    • Anxiety state    • Backache    • Breast tenderness    • Bronchitis    • Candidiasis of mouth    • Constipation    • Depression    • Dysuria    • Essential hypertension    • GERD (gastroesophageal reflux disease)    • Hypertension    • Impacted cerumen    • Insomnia    • Local infection of the skin and subcutaneous tissue, unspecified     R external ear      • Low back pain     with radiculopathy L buttock and posterior leg      • Malaise and fatigue    • Migraine with aura    • Migraines    • MS (multiple sclerosis) (CMS/HCC)    • Multiple sclerosis (CMS/HCC)    • Palpitations    • Tinea  corporis    • Vaginal discharge    • Vulvovaginitis     yeast infection       Allergies   Allergen Reactions   • Adhesive Tape Itching and Dermatitis     Can not use IV tape, tegaderm--needs paper tape   • Lisinopril Cough   • Tamiflu [Oseltamivir Phosphate] Rash       Past Surgical History:   Procedure Laterality Date   •  SECTION     • HYSTERECTOMY     • INJECTION OF MEDICATION  2016    Celestone (betamethasone) (1)      • INJECTION OF MEDICATION  05/10/2016    Rocephin (1)      • INJECTION OF MEDICATION  2015    Toradol (1)      • INJECTION OF MEDICATION  2015    Zofran (1)      • TUBAL ABDOMINAL LIGATION      Examination under anesthesia and laparoscopic tubal.   • UPPER GASTROINTESTINAL ENDOSCOPY  2015    Hiatal hernia. Gastritis. Unspecified gastric ulcer. Performed at [facility].       Family History   Problem Relation Age of Onset   • Cancer Maternal Grandmother    • Cancer Maternal Grandfather    • Cancer Paternal Grandmother    • Cancer Paternal Grandfather    • Breast cancer Other        Social History     Socioeconomic History   • Marital status: Single     Spouse name: Not on file   • Number of children: Not on file   • Years of education: Not on file   • Highest education level: Not on file   Tobacco Use   • Smoking status: Never Smoker   Substance and Sexual Activity   • Alcohol use: Yes     Comment: pt states she drinks about 2 a week   • Drug use: No           Objective   Physical Exam   Constitutional: She is oriented to person, place, and time. She appears well-developed.   Cardiovascular: Normal rate and regular rhythm.   Pulmonary/Chest: Effort normal and breath sounds normal.   Abdominal: Soft. Bowel sounds are normal. She exhibits no distension. There is no tenderness. There is no guarding.   Rectal digital exam performed. Patient placed in left semi-fowlers position. Bright red blood noted at anal opening. Small hemorrhoid noted at 9 o'clock. Formed  stool in rectal vault. Hemocult +   Genitourinary: Rectal exam shows guaiac negative stool.         Musculoskeletal: Normal range of motion.   Neurological: She is alert and oriented to person, place, and time.   Skin: Skin is warm and dry.   Psychiatric: She has a normal mood and affect.       Procedures           ED Course      Results for orders placed or performed during the hospital encounter of 12/14/19   Comprehensive Metabolic Panel   Result Value Ref Range    Glucose 105 (H) 65 - 99 mg/dL    BUN 16 6 - 20 mg/dL    Creatinine 0.57 0.57 - 1.00 mg/dL    Sodium 142 136 - 145 mmol/L    Potassium 3.7 3.5 - 5.2 mmol/L    Chloride 108 (H) 98 - 107 mmol/L    CO2 24.0 22.0 - 29.0 mmol/L    Calcium 8.5 (L) 8.6 - 10.5 mg/dL    Total Protein 7.0 6.0 - 8.5 g/dL    Albumin 3.80 3.50 - 5.20 g/dL    ALT (SGPT) 22 1 - 33 U/L    AST (SGOT) 28 1 - 32 U/L    Alkaline Phosphatase 122 (H) 39 - 117 U/L    Total Bilirubin 0.2 0.2 - 1.2 mg/dL    eGFR  African Amer 141 >60 mL/min/1.73    Globulin 3.2 gm/dL    A/G Ratio 1.2 g/dL    BUN/Creatinine Ratio 28.1 (H) 7.0 - 25.0    Anion Gap 10.0 5.0 - 15.0 mmol/L   CBC Auto Differential   Result Value Ref Range    WBC 9.32 3.40 - 10.80 10*3/mm3    RBC 4.25 3.77 - 5.28 10*6/mm3    Hemoglobin 10.3 (L) 12.0 - 15.9 g/dL    Hematocrit 33.5 (L) 34.0 - 46.6 %    MCV 78.8 (L) 79.0 - 97.0 fL    MCH 24.2 (L) 26.6 - 33.0 pg    MCHC 30.7 (L) 31.5 - 35.7 g/dL    RDW 16.3 (H) 12.3 - 15.4 %    RDW-SD 46.4 37.0 - 54.0 fl    MPV 10.5 6.0 - 12.0 fL    Platelets 293 140 - 450 10*3/mm3    Neutrophil % 56.4 42.7 - 76.0 %    Lymphocyte % 33.3 19.6 - 45.3 %    Monocyte % 6.7 5.0 - 12.0 %    Eosinophil % 2.4 0.3 - 6.2 %    Basophil % 0.9 0.0 - 1.5 %    Immature Grans % 0.3 0.0 - 0.5 %    Neutrophils, Absolute 5.27 1.70 - 7.00 10*3/mm3    Lymphocytes, Absolute 3.10 0.70 - 3.10 10*3/mm3    Monocytes, Absolute 0.62 0.10 - 0.90 10*3/mm3    Eosinophils, Absolute 0.22 0.00 - 0.40 10*3/mm3    Basophils, Absolute 0.08  0.00 - 0.20 10*3/mm3    Immature Grans, Absolute 0.03 0.00 - 0.05 10*3/mm3    nRBC 0.4 (H) 0.0 - 0.2 /100 WBC   Light Blue Top   Result Value Ref Range    Extra Tube hold for add-on    Gold Top - SST   Result Value Ref Range    Extra Tube Hold for add-ons.                    No data recorded                        MDM  Number of Diagnoses or Management Options  Constipation, unspecified constipation type: new and requires workup  External bleeding hemorrhoids: new and requires workup  Diagnosis management comments: Placed on colace and anusol. Follow up with pcp       Final diagnoses:   Constipation, unspecified constipation type   External bleeding hemorrhoids              Anupam Tena, APRN  12/14/19 1622       Anupam Tena, APRN  12/14/19 1620

## 2020-01-06 ENCOUNTER — INFUSION (OUTPATIENT)
Dept: ONCOLOGY | Facility: HOSPITAL | Age: 45
End: 2020-01-06

## 2020-01-06 VITALS
RESPIRATION RATE: 16 BRPM | TEMPERATURE: 97.5 F | HEART RATE: 80 BPM | DIASTOLIC BLOOD PRESSURE: 60 MMHG | SYSTOLIC BLOOD PRESSURE: 128 MMHG

## 2020-01-06 DIAGNOSIS — G35 MULTIPLE SCLEROSIS (HCC): Primary | ICD-10-CM

## 2020-01-06 PROCEDURE — A9270 NON-COVERED ITEM OR SERVICE: HCPCS | Performed by: NURSE PRACTITIONER

## 2020-01-06 PROCEDURE — 63710000001 ACETAMINOPHEN 325 MG TABLET: Performed by: NURSE PRACTITIONER

## 2020-01-06 PROCEDURE — 25010000002 NATALIZUMAB PER 1 MG: Performed by: NURSE PRACTITIONER

## 2020-01-06 PROCEDURE — 96365 THER/PROPH/DIAG IV INF INIT: CPT | Performed by: NURSE PRACTITIONER

## 2020-01-06 RX ORDER — LORATADINE 10 MG/1
10 TABLET ORAL DAILY
Status: DISCONTINUED | OUTPATIENT
Start: 2020-01-06 | End: 2020-01-06 | Stop reason: HOSPADM

## 2020-01-06 RX ORDER — DIPHENHYDRAMINE HCL 25 MG
25 CAPSULE ORAL ONCE
Status: CANCELLED | OUTPATIENT
Start: 2020-02-03

## 2020-01-06 RX ORDER — LORATADINE 10 MG/1
10 TABLET ORAL DAILY
Status: CANCELLED
Start: 2020-02-03

## 2020-01-06 RX ORDER — ACETAMINOPHEN 325 MG/1
650 TABLET ORAL ONCE
Status: CANCELLED | OUTPATIENT
Start: 2020-02-03

## 2020-01-06 RX ORDER — ACETAMINOPHEN 325 MG/1
650 TABLET ORAL ONCE
Status: COMPLETED | OUTPATIENT
Start: 2020-01-06 | End: 2020-01-06

## 2020-01-06 RX ORDER — SODIUM CHLORIDE 9 MG/ML
250 INJECTION, SOLUTION INTRAVENOUS ONCE
Status: COMPLETED | OUTPATIENT
Start: 2020-01-06 | End: 2020-01-06

## 2020-01-06 RX ORDER — SODIUM CHLORIDE 9 MG/ML
250 INJECTION, SOLUTION INTRAVENOUS ONCE
Status: CANCELLED | OUTPATIENT
Start: 2020-02-03

## 2020-01-06 RX ORDER — ONDANSETRON 4 MG/1
4 TABLET, ORALLY DISINTEGRATING ORAL ONCE
Status: DISCONTINUED | OUTPATIENT
Start: 2020-01-06 | End: 2020-01-06 | Stop reason: HOSPADM

## 2020-01-06 RX ORDER — ONDANSETRON 4 MG/1
4 TABLET, ORALLY DISINTEGRATING ORAL ONCE
Status: CANCELLED
Start: 2020-02-03

## 2020-01-06 RX ADMIN — ACETAMINOPHEN 650 MG: 325 TABLET, FILM COATED ORAL at 11:03

## 2020-01-06 RX ADMIN — NATALIZUMAB 300 MG: 300 INJECTION INTRAVENOUS at 11:12

## 2020-01-06 RX ADMIN — SODIUM CHLORIDE 250 ML: 9 INJECTION, SOLUTION INTRAVENOUS at 11:00

## 2020-02-03 ENCOUNTER — INFUSION (OUTPATIENT)
Dept: PEDIATRICS | Facility: HOSPITAL | Age: 45
End: 2020-02-03

## 2020-02-03 VITALS
DIASTOLIC BLOOD PRESSURE: 85 MMHG | SYSTOLIC BLOOD PRESSURE: 160 MMHG | TEMPERATURE: 98 F | RESPIRATION RATE: 18 BRPM | HEART RATE: 72 BPM

## 2020-02-03 DIAGNOSIS — G35 MULTIPLE SCLEROSIS (HCC): Primary | ICD-10-CM

## 2020-02-03 PROCEDURE — 96365 THER/PROPH/DIAG IV INF INIT: CPT | Performed by: NURSE PRACTITIONER

## 2020-02-03 PROCEDURE — 25010000002 NATALIZUMAB PER 1 MG: Performed by: NURSE PRACTITIONER

## 2020-02-03 PROCEDURE — 63710000001 ACETAMINOPHEN 325 MG TABLET: Performed by: NURSE PRACTITIONER

## 2020-02-03 PROCEDURE — A9270 NON-COVERED ITEM OR SERVICE: HCPCS | Performed by: NURSE PRACTITIONER

## 2020-02-03 RX ORDER — ACETAMINOPHEN 325 MG/1
650 TABLET ORAL ONCE
Status: COMPLETED | OUTPATIENT
Start: 2020-02-03 | End: 2020-02-03

## 2020-02-03 RX ORDER — ONDANSETRON 4 MG/1
4 TABLET, ORALLY DISINTEGRATING ORAL ONCE
Status: DISCONTINUED | OUTPATIENT
Start: 2020-02-03 | End: 2020-02-03 | Stop reason: HOSPADM

## 2020-02-03 RX ORDER — DIPHENHYDRAMINE HCL 25 MG
25 CAPSULE ORAL ONCE
Status: CANCELLED | OUTPATIENT
Start: 2020-03-02

## 2020-02-03 RX ORDER — ACETAMINOPHEN 325 MG/1
650 TABLET ORAL ONCE
Status: CANCELLED | OUTPATIENT
Start: 2020-03-02

## 2020-02-03 RX ORDER — ONDANSETRON 4 MG/1
4 TABLET, ORALLY DISINTEGRATING ORAL ONCE
Status: CANCELLED
Start: 2020-03-02

## 2020-02-03 RX ORDER — LORATADINE 10 MG/1
10 TABLET ORAL DAILY
Status: CANCELLED
Start: 2020-03-02

## 2020-02-03 RX ORDER — LORATADINE 10 MG/1
10 TABLET ORAL DAILY
Status: DISCONTINUED | OUTPATIENT
Start: 2020-02-03 | End: 2020-02-03 | Stop reason: RX

## 2020-02-03 RX ORDER — SODIUM CHLORIDE 9 MG/ML
250 INJECTION, SOLUTION INTRAVENOUS ONCE
Status: CANCELLED | OUTPATIENT
Start: 2020-03-02

## 2020-02-03 RX ORDER — SODIUM CHLORIDE 9 MG/ML
250 INJECTION, SOLUTION INTRAVENOUS ONCE
Status: COMPLETED | OUTPATIENT
Start: 2020-02-03 | End: 2020-02-03

## 2020-02-03 RX ADMIN — SODIUM CHLORIDE 250 ML: 9 INJECTION, SOLUTION INTRAVENOUS at 14:25

## 2020-02-03 RX ADMIN — NATALIZUMAB 300 MG: 300 INJECTION INTRAVENOUS at 14:47

## 2020-02-03 RX ADMIN — ACETAMINOPHEN 650 MG: 325 TABLET, FILM COATED ORAL at 14:25

## 2020-02-26 RX ORDER — DIPHENHYDRAMINE HYDROCHLORIDE 50 MG/ML
25 INJECTION INTRAMUSCULAR; INTRAVENOUS AS NEEDED
Status: CANCELLED
Start: 2020-03-02

## 2020-02-26 RX ORDER — IBUPROFEN 200 MG
400 TABLET ORAL AS NEEDED
Status: CANCELLED
Start: 2020-03-02

## 2020-02-26 RX ORDER — ONDANSETRON 2 MG/ML
4 INJECTION INTRAMUSCULAR; INTRAVENOUS AS NEEDED
Status: CANCELLED
Start: 2020-03-02

## 2020-03-02 ENCOUNTER — INFUSION (OUTPATIENT)
Dept: PEDIATRICS | Facility: HOSPITAL | Age: 45
End: 2020-03-02

## 2020-03-02 VITALS
HEART RATE: 78 BPM | DIASTOLIC BLOOD PRESSURE: 74 MMHG | TEMPERATURE: 98.2 F | SYSTOLIC BLOOD PRESSURE: 131 MMHG | RESPIRATION RATE: 18 BRPM

## 2020-03-02 DIAGNOSIS — G35 MULTIPLE SCLEROSIS (HCC): Primary | ICD-10-CM

## 2020-03-02 PROCEDURE — 63710000001 ACETAMINOPHEN 325 MG TABLET: Performed by: NURSE PRACTITIONER

## 2020-03-02 PROCEDURE — 25010000002 NATALIZUMAB PER 1 MG: Performed by: NURSE PRACTITIONER

## 2020-03-02 PROCEDURE — A9270 NON-COVERED ITEM OR SERVICE: HCPCS | Performed by: NURSE PRACTITIONER

## 2020-03-02 PROCEDURE — 96365 THER/PROPH/DIAG IV INF INIT: CPT | Performed by: NURSE PRACTITIONER

## 2020-03-02 RX ORDER — ONDANSETRON 2 MG/ML
4 INJECTION INTRAMUSCULAR; INTRAVENOUS AS NEEDED
Status: CANCELLED
Start: 2020-03-30

## 2020-03-02 RX ORDER — SODIUM CHLORIDE 9 MG/ML
250 INJECTION, SOLUTION INTRAVENOUS ONCE
Status: CANCELLED | OUTPATIENT
Start: 2020-03-30

## 2020-03-02 RX ORDER — LORATADINE 10 MG/1
10 TABLET ORAL DAILY
Status: CANCELLED
Start: 2020-03-30

## 2020-03-02 RX ORDER — DIPHENHYDRAMINE HCL 25 MG
25 CAPSULE ORAL ONCE
Status: CANCELLED | OUTPATIENT
Start: 2020-03-30

## 2020-03-02 RX ORDER — SODIUM CHLORIDE 9 MG/ML
250 INJECTION, SOLUTION INTRAVENOUS ONCE
Status: COMPLETED | OUTPATIENT
Start: 2020-03-02 | End: 2020-03-02

## 2020-03-02 RX ORDER — DIPHENHYDRAMINE HYDROCHLORIDE 50 MG/ML
25 INJECTION INTRAMUSCULAR; INTRAVENOUS AS NEEDED
Status: CANCELLED
Start: 2020-03-30

## 2020-03-02 RX ORDER — ONDANSETRON 4 MG/1
4 TABLET, ORALLY DISINTEGRATING ORAL ONCE
Status: DISCONTINUED | OUTPATIENT
Start: 2020-03-02 | End: 2020-03-02 | Stop reason: HOSPADM

## 2020-03-02 RX ORDER — ACETAMINOPHEN 325 MG/1
650 TABLET ORAL ONCE
Status: CANCELLED | OUTPATIENT
Start: 2020-03-30

## 2020-03-02 RX ORDER — LORATADINE 10 MG/1
10 TABLET ORAL DAILY
Status: DISCONTINUED | OUTPATIENT
Start: 2020-03-02 | End: 2020-03-02

## 2020-03-02 RX ORDER — ACETAMINOPHEN 325 MG/1
650 TABLET ORAL ONCE
Status: COMPLETED | OUTPATIENT
Start: 2020-03-02 | End: 2020-03-02

## 2020-03-02 RX ORDER — IBUPROFEN 400 MG/1
400 TABLET ORAL AS NEEDED
Status: CANCELLED
Start: 2020-03-30

## 2020-03-02 RX ORDER — ONDANSETRON 4 MG/1
4 TABLET, ORALLY DISINTEGRATING ORAL ONCE
Status: CANCELLED
Start: 2020-03-30

## 2020-03-02 RX ADMIN — SODIUM CHLORIDE 250 ML: 9 INJECTION, SOLUTION INTRAVENOUS at 10:35

## 2020-03-02 RX ADMIN — ACETAMINOPHEN 650 MG: 325 TABLET, FILM COATED ORAL at 10:35

## 2020-03-02 RX ADMIN — NATALIZUMAB 300 MG: 300 INJECTION INTRAVENOUS at 10:48

## 2020-03-30 ENCOUNTER — INFUSION (OUTPATIENT)
Dept: ONCOLOGY | Facility: HOSPITAL | Age: 45
End: 2020-03-30

## 2020-03-30 VITALS
RESPIRATION RATE: 18 BRPM | TEMPERATURE: 97.9 F | HEART RATE: 77 BPM | SYSTOLIC BLOOD PRESSURE: 153 MMHG | DIASTOLIC BLOOD PRESSURE: 84 MMHG

## 2020-03-30 DIAGNOSIS — G35 MULTIPLE SCLEROSIS (HCC): Primary | ICD-10-CM

## 2020-03-30 PROCEDURE — 63710000001 ACETAMINOPHEN 325 MG TABLET: Performed by: NURSE PRACTITIONER

## 2020-03-30 PROCEDURE — 96365 THER/PROPH/DIAG IV INF INIT: CPT

## 2020-03-30 PROCEDURE — 25010000002 NATALIZUMAB PER 1 MG: Performed by: NURSE PRACTITIONER

## 2020-03-30 PROCEDURE — A9270 NON-COVERED ITEM OR SERVICE: HCPCS | Performed by: NURSE PRACTITIONER

## 2020-03-30 RX ORDER — ACETAMINOPHEN 325 MG/1
650 TABLET ORAL ONCE
Status: COMPLETED | OUTPATIENT
Start: 2020-03-30 | End: 2020-03-30

## 2020-03-30 RX ORDER — ONDANSETRON 2 MG/ML
4 INJECTION INTRAMUSCULAR; INTRAVENOUS AS NEEDED
Status: CANCELLED
Start: 2020-04-27

## 2020-03-30 RX ORDER — SODIUM CHLORIDE 9 MG/ML
250 INJECTION, SOLUTION INTRAVENOUS ONCE
Status: CANCELLED | OUTPATIENT
Start: 2020-04-27

## 2020-03-30 RX ORDER — SODIUM CHLORIDE 9 MG/ML
250 INJECTION, SOLUTION INTRAVENOUS ONCE
Status: COMPLETED | OUTPATIENT
Start: 2020-03-30 | End: 2020-03-30

## 2020-03-30 RX ORDER — ONDANSETRON 4 MG/1
4 TABLET, ORALLY DISINTEGRATING ORAL ONCE
Status: DISCONTINUED | OUTPATIENT
Start: 2020-03-30 | End: 2020-03-30 | Stop reason: HOSPADM

## 2020-03-30 RX ORDER — DIPHENHYDRAMINE HYDROCHLORIDE 50 MG/ML
25 INJECTION INTRAMUSCULAR; INTRAVENOUS AS NEEDED
Status: CANCELLED
Start: 2020-04-27

## 2020-03-30 RX ORDER — DIPHENHYDRAMINE HCL 25 MG
25 CAPSULE ORAL ONCE
Status: CANCELLED | OUTPATIENT
Start: 2020-04-27

## 2020-03-30 RX ORDER — IBUPROFEN 400 MG/1
400 TABLET ORAL AS NEEDED
Status: CANCELLED
Start: 2020-04-27

## 2020-03-30 RX ORDER — ACETAMINOPHEN 325 MG/1
650 TABLET ORAL ONCE
Status: CANCELLED | OUTPATIENT
Start: 2020-04-27

## 2020-03-30 RX ORDER — ONDANSETRON 4 MG/1
4 TABLET, ORALLY DISINTEGRATING ORAL ONCE
Status: CANCELLED
Start: 2020-04-27

## 2020-03-30 RX ORDER — LORATADINE 10 MG/1
10 TABLET ORAL DAILY
Status: CANCELLED
Start: 2020-04-27

## 2020-03-30 RX ORDER — LORATADINE 10 MG/1
10 TABLET ORAL DAILY
Status: DISCONTINUED | OUTPATIENT
Start: 2020-03-30 | End: 2020-03-30

## 2020-03-30 RX ADMIN — NATALIZUMAB 300 MG: 300 INJECTION INTRAVENOUS at 13:39

## 2020-03-30 RX ADMIN — SODIUM CHLORIDE 250 ML: 9 INJECTION, SOLUTION INTRAVENOUS at 13:39

## 2020-03-30 RX ADMIN — ACETAMINOPHEN 650 MG: 325 TABLET, FILM COATED ORAL at 13:28

## 2020-04-27 ENCOUNTER — INFUSION (OUTPATIENT)
Dept: ONCOLOGY | Facility: HOSPITAL | Age: 45
End: 2020-04-27

## 2020-04-27 VITALS
HEART RATE: 72 BPM | RESPIRATION RATE: 18 BRPM | SYSTOLIC BLOOD PRESSURE: 136 MMHG | DIASTOLIC BLOOD PRESSURE: 67 MMHG | TEMPERATURE: 97.9 F

## 2020-04-27 DIAGNOSIS — G35 MULTIPLE SCLEROSIS (HCC): Primary | ICD-10-CM

## 2020-04-27 PROCEDURE — 96365 THER/PROPH/DIAG IV INF INIT: CPT | Performed by: NURSE PRACTITIONER

## 2020-04-27 PROCEDURE — 63710000001 ACETAMINOPHEN 325 MG TABLET: Performed by: NURSE PRACTITIONER

## 2020-04-27 PROCEDURE — A9270 NON-COVERED ITEM OR SERVICE: HCPCS | Performed by: NURSE PRACTITIONER

## 2020-04-27 PROCEDURE — 25010000002 NATALIZUMAB PER 1 MG: Performed by: NURSE PRACTITIONER

## 2020-04-27 RX ORDER — SODIUM CHLORIDE 9 MG/ML
250 INJECTION, SOLUTION INTRAVENOUS ONCE
Status: CANCELLED | OUTPATIENT
Start: 2020-05-25

## 2020-04-27 RX ORDER — ONDANSETRON 4 MG/1
4 TABLET, ORALLY DISINTEGRATING ORAL ONCE
Status: CANCELLED
Start: 2020-05-25

## 2020-04-27 RX ORDER — ONDANSETRON 2 MG/ML
4 INJECTION INTRAMUSCULAR; INTRAVENOUS AS NEEDED
Status: CANCELLED
Start: 2020-05-25

## 2020-04-27 RX ORDER — ACETAMINOPHEN 325 MG/1
650 TABLET ORAL ONCE
Status: COMPLETED | OUTPATIENT
Start: 2020-04-27 | End: 2020-04-27

## 2020-04-27 RX ORDER — IBUPROFEN 400 MG/1
400 TABLET ORAL AS NEEDED
Status: CANCELLED
Start: 2020-05-25

## 2020-04-27 RX ORDER — DIPHENHYDRAMINE HYDROCHLORIDE 50 MG/ML
25 INJECTION INTRAMUSCULAR; INTRAVENOUS AS NEEDED
Status: CANCELLED
Start: 2020-05-25

## 2020-04-27 RX ORDER — ACETAMINOPHEN 325 MG/1
650 TABLET ORAL ONCE
Status: CANCELLED | OUTPATIENT
Start: 2020-05-25

## 2020-04-27 RX ORDER — LORATADINE 10 MG/1
10 TABLET ORAL DAILY
Status: CANCELLED
Start: 2020-05-25

## 2020-04-27 RX ORDER — DIPHENHYDRAMINE HCL 25 MG
25 CAPSULE ORAL ONCE
Status: CANCELLED | OUTPATIENT
Start: 2020-05-25

## 2020-04-27 RX ORDER — SODIUM CHLORIDE 9 MG/ML
250 INJECTION, SOLUTION INTRAVENOUS ONCE
Status: COMPLETED | OUTPATIENT
Start: 2020-04-27 | End: 2020-04-27

## 2020-04-27 RX ADMIN — ACETAMINOPHEN 650 MG: 325 TABLET, FILM COATED ORAL at 10:40

## 2020-04-27 RX ADMIN — NATALIZUMAB 300 MG: 300 INJECTION INTRAVENOUS at 11:02

## 2020-04-27 RX ADMIN — SODIUM CHLORIDE 250 ML: 9 INJECTION, SOLUTION INTRAVENOUS at 10:39

## 2020-05-26 ENCOUNTER — INFUSION (OUTPATIENT)
Dept: ONCOLOGY | Facility: HOSPITAL | Age: 45
End: 2020-05-26

## 2020-05-26 VITALS
TEMPERATURE: 97.2 F | SYSTOLIC BLOOD PRESSURE: 140 MMHG | HEART RATE: 79 BPM | DIASTOLIC BLOOD PRESSURE: 72 MMHG | RESPIRATION RATE: 18 BRPM

## 2020-05-26 DIAGNOSIS — G35 MULTIPLE SCLEROSIS (HCC): Primary | ICD-10-CM

## 2020-05-26 PROCEDURE — 96365 THER/PROPH/DIAG IV INF INIT: CPT | Performed by: NURSE PRACTITIONER

## 2020-05-26 PROCEDURE — A9270 NON-COVERED ITEM OR SERVICE: HCPCS | Performed by: NURSE PRACTITIONER

## 2020-05-26 PROCEDURE — 63710000001 ACETAMINOPHEN 325 MG TABLET: Performed by: NURSE PRACTITIONER

## 2020-05-26 PROCEDURE — 25010000002 NATALIZUMAB PER 1 MG: Performed by: NURSE PRACTITIONER

## 2020-05-26 RX ORDER — DIPHENHYDRAMINE HYDROCHLORIDE 50 MG/ML
25 INJECTION INTRAMUSCULAR; INTRAVENOUS AS NEEDED
Status: CANCELLED
Start: 2020-06-22

## 2020-05-26 RX ORDER — LORATADINE 10 MG/1
10 TABLET ORAL DAILY
Status: CANCELLED
Start: 2020-06-22

## 2020-05-26 RX ORDER — ONDANSETRON 4 MG/1
4 TABLET, ORALLY DISINTEGRATING ORAL ONCE
Status: CANCELLED
Start: 2020-06-22

## 2020-05-26 RX ORDER — IBUPROFEN 400 MG/1
400 TABLET ORAL AS NEEDED
Status: CANCELLED
Start: 2020-06-22

## 2020-05-26 RX ORDER — SODIUM CHLORIDE 9 MG/ML
250 INJECTION, SOLUTION INTRAVENOUS ONCE
Status: COMPLETED | OUTPATIENT
Start: 2020-05-26 | End: 2020-05-26

## 2020-05-26 RX ORDER — DIPHENHYDRAMINE HCL 25 MG
25 CAPSULE ORAL ONCE
Status: CANCELLED | OUTPATIENT
Start: 2020-06-22

## 2020-05-26 RX ORDER — ACETAMINOPHEN 325 MG/1
650 TABLET ORAL ONCE
Status: CANCELLED | OUTPATIENT
Start: 2020-06-22

## 2020-05-26 RX ORDER — SODIUM CHLORIDE 9 MG/ML
250 INJECTION, SOLUTION INTRAVENOUS ONCE
Status: CANCELLED | OUTPATIENT
Start: 2020-06-22

## 2020-05-26 RX ORDER — LORATADINE 10 MG/1
10 TABLET ORAL DAILY
Status: DISCONTINUED | OUTPATIENT
Start: 2020-05-26 | End: 2020-05-26 | Stop reason: HOSPADM

## 2020-05-26 RX ORDER — ONDANSETRON 2 MG/ML
4 INJECTION INTRAMUSCULAR; INTRAVENOUS AS NEEDED
Status: CANCELLED
Start: 2020-06-22

## 2020-05-26 RX ORDER — ACETAMINOPHEN 325 MG/1
650 TABLET ORAL ONCE
Status: COMPLETED | OUTPATIENT
Start: 2020-05-26 | End: 2020-05-26

## 2020-05-26 RX ORDER — ONDANSETRON 4 MG/1
4 TABLET, ORALLY DISINTEGRATING ORAL ONCE
Status: DISCONTINUED | OUTPATIENT
Start: 2020-05-26 | End: 2020-05-26 | Stop reason: HOSPADM

## 2020-05-26 RX ADMIN — SODIUM CHLORIDE 250 ML: 9 INJECTION, SOLUTION INTRAVENOUS at 10:55

## 2020-05-26 RX ADMIN — ACETAMINOPHEN 650 MG: 325 TABLET, FILM COATED ORAL at 10:54

## 2020-05-26 RX ADMIN — NATALIZUMAB 300 MG: 300 INJECTION INTRAVENOUS at 11:06

## 2020-06-23 ENCOUNTER — INFUSION (OUTPATIENT)
Dept: ONCOLOGY | Facility: HOSPITAL | Age: 45
End: 2020-06-23

## 2020-06-23 VITALS
TEMPERATURE: 97.9 F | RESPIRATION RATE: 18 BRPM | HEART RATE: 84 BPM | DIASTOLIC BLOOD PRESSURE: 85 MMHG | SYSTOLIC BLOOD PRESSURE: 161 MMHG

## 2020-06-23 DIAGNOSIS — G35 MULTIPLE SCLEROSIS (HCC): Primary | ICD-10-CM

## 2020-06-23 PROCEDURE — 96365 THER/PROPH/DIAG IV INF INIT: CPT | Performed by: NURSE PRACTITIONER

## 2020-06-23 PROCEDURE — 25010000002 NATALIZUMAB PER 1 MG: Performed by: NURSE PRACTITIONER

## 2020-06-23 RX ORDER — DIPHENHYDRAMINE HCL 25 MG
25 CAPSULE ORAL ONCE
Status: DISCONTINUED | OUTPATIENT
Start: 2020-06-23 | End: 2020-06-23 | Stop reason: HOSPADM

## 2020-06-23 RX ORDER — ACETAMINOPHEN 325 MG/1
650 TABLET ORAL ONCE
Status: COMPLETED | OUTPATIENT
Start: 2020-06-23 | End: 2020-06-23

## 2020-06-23 RX ORDER — ACETAMINOPHEN 325 MG/1
650 TABLET ORAL ONCE
Status: CANCELLED | OUTPATIENT
Start: 2020-07-21

## 2020-06-23 RX ORDER — ONDANSETRON 2 MG/ML
4 INJECTION INTRAMUSCULAR; INTRAVENOUS AS NEEDED
Status: CANCELLED
Start: 2020-07-21

## 2020-06-23 RX ORDER — DIPHENHYDRAMINE HYDROCHLORIDE 50 MG/ML
25 INJECTION INTRAMUSCULAR; INTRAVENOUS AS NEEDED
Status: CANCELLED
Start: 2020-07-21

## 2020-06-23 RX ORDER — IBUPROFEN 400 MG/1
400 TABLET ORAL AS NEEDED
Status: CANCELLED
Start: 2020-07-21

## 2020-06-23 RX ORDER — DIPHENHYDRAMINE HCL 25 MG
25 CAPSULE ORAL ONCE
Status: CANCELLED | OUTPATIENT
Start: 2020-07-21

## 2020-06-23 RX ORDER — ONDANSETRON 4 MG/1
4 TABLET, ORALLY DISINTEGRATING ORAL ONCE
Status: CANCELLED
Start: 2020-07-21

## 2020-06-23 RX ORDER — SODIUM CHLORIDE 9 MG/ML
250 INJECTION, SOLUTION INTRAVENOUS ONCE
Status: COMPLETED | OUTPATIENT
Start: 2020-06-23 | End: 2020-06-23

## 2020-06-23 RX ORDER — LORATADINE 10 MG/1
10 TABLET ORAL DAILY
Status: DISCONTINUED | OUTPATIENT
Start: 2020-06-23 | End: 2020-06-23

## 2020-06-23 RX ORDER — LORATADINE 10 MG/1
10 TABLET ORAL DAILY
Status: CANCELLED
Start: 2020-07-21

## 2020-06-23 RX ORDER — ONDANSETRON 4 MG/1
4 TABLET, ORALLY DISINTEGRATING ORAL ONCE
Status: DISCONTINUED | OUTPATIENT
Start: 2020-06-23 | End: 2020-06-23 | Stop reason: HOSPADM

## 2020-06-23 RX ORDER — SODIUM CHLORIDE 9 MG/ML
250 INJECTION, SOLUTION INTRAVENOUS ONCE
Status: CANCELLED | OUTPATIENT
Start: 2020-07-21

## 2020-06-23 RX ADMIN — ACETAMINOPHEN 650 MG: 325 TABLET, FILM COATED ORAL at 11:17

## 2020-06-23 RX ADMIN — SODIUM CHLORIDE 250 ML: 9 INJECTION, SOLUTION INTRAVENOUS at 11:16

## 2020-06-23 RX ADMIN — NATALIZUMAB 300 MG: 300 INJECTION INTRAVENOUS at 11:36

## 2020-07-21 ENCOUNTER — INFUSION (OUTPATIENT)
Dept: ONCOLOGY | Facility: HOSPITAL | Age: 45
End: 2020-07-21

## 2020-07-21 VITALS
RESPIRATION RATE: 18 BRPM | SYSTOLIC BLOOD PRESSURE: 134 MMHG | HEART RATE: 78 BPM | TEMPERATURE: 96.9 F | DIASTOLIC BLOOD PRESSURE: 79 MMHG

## 2020-07-21 DIAGNOSIS — G35 MULTIPLE SCLEROSIS (HCC): Primary | ICD-10-CM

## 2020-07-21 PROCEDURE — 96365 THER/PROPH/DIAG IV INF INIT: CPT | Performed by: NURSE PRACTITIONER

## 2020-07-21 PROCEDURE — 25010000002 NATALIZUMAB PER 1 MG: Performed by: NURSE PRACTITIONER

## 2020-07-21 RX ORDER — ONDANSETRON 2 MG/ML
4 INJECTION INTRAMUSCULAR; INTRAVENOUS AS NEEDED
Status: CANCELLED
Start: 2020-08-18

## 2020-07-21 RX ORDER — ACETAMINOPHEN 325 MG/1
650 TABLET ORAL ONCE
Status: CANCELLED | OUTPATIENT
Start: 2020-08-18

## 2020-07-21 RX ORDER — LORATADINE 10 MG/1
10 TABLET ORAL DAILY
Status: DISCONTINUED | OUTPATIENT
Start: 2020-07-21 | End: 2020-07-21

## 2020-07-21 RX ORDER — ACETAMINOPHEN 325 MG/1
650 TABLET ORAL ONCE
Status: COMPLETED | OUTPATIENT
Start: 2020-07-21 | End: 2020-07-21

## 2020-07-21 RX ORDER — LORATADINE 10 MG/1
10 TABLET ORAL DAILY
Status: CANCELLED
Start: 2020-08-18

## 2020-07-21 RX ORDER — IBUPROFEN 400 MG/1
400 TABLET ORAL AS NEEDED
Status: CANCELLED
Start: 2020-08-18

## 2020-07-21 RX ORDER — ONDANSETRON 4 MG/1
4 TABLET, ORALLY DISINTEGRATING ORAL ONCE
Status: DISCONTINUED | OUTPATIENT
Start: 2020-07-21 | End: 2020-07-21 | Stop reason: HOSPADM

## 2020-07-21 RX ORDER — DIPHENHYDRAMINE HYDROCHLORIDE 50 MG/ML
25 INJECTION INTRAMUSCULAR; INTRAVENOUS AS NEEDED
Status: CANCELLED
Start: 2020-08-18

## 2020-07-21 RX ORDER — SODIUM CHLORIDE 9 MG/ML
250 INJECTION, SOLUTION INTRAVENOUS ONCE
Status: COMPLETED | OUTPATIENT
Start: 2020-07-21 | End: 2020-07-21

## 2020-07-21 RX ORDER — SODIUM CHLORIDE 9 MG/ML
250 INJECTION, SOLUTION INTRAVENOUS ONCE
Status: CANCELLED | OUTPATIENT
Start: 2020-08-18

## 2020-07-21 RX ORDER — DIPHENHYDRAMINE HCL 25 MG
25 CAPSULE ORAL ONCE
Status: CANCELLED | OUTPATIENT
Start: 2020-08-18

## 2020-07-21 RX ORDER — ONDANSETRON 4 MG/1
4 TABLET, ORALLY DISINTEGRATING ORAL ONCE
Status: CANCELLED
Start: 2020-08-18

## 2020-07-21 RX ADMIN — NATALIZUMAB 300 MG: 300 INJECTION INTRAVENOUS at 11:23

## 2020-07-21 RX ADMIN — SODIUM CHLORIDE 250 ML: 9 INJECTION, SOLUTION INTRAVENOUS at 11:11

## 2020-07-21 RX ADMIN — ACETAMINOPHEN 650 MG: 325 TABLET, FILM COATED ORAL at 11:11

## 2020-08-18 ENCOUNTER — APPOINTMENT (OUTPATIENT)
Dept: ONCOLOGY | Facility: HOSPITAL | Age: 45
End: 2020-08-18

## 2020-08-19 ENCOUNTER — INFUSION (OUTPATIENT)
Dept: ONCOLOGY | Facility: HOSPITAL | Age: 45
End: 2020-08-19

## 2020-08-19 VITALS
RESPIRATION RATE: 18 BRPM | SYSTOLIC BLOOD PRESSURE: 140 MMHG | TEMPERATURE: 97.6 F | HEART RATE: 68 BPM | DIASTOLIC BLOOD PRESSURE: 73 MMHG

## 2020-08-19 DIAGNOSIS — G35 MULTIPLE SCLEROSIS (HCC): Primary | ICD-10-CM

## 2020-08-19 PROCEDURE — 96365 THER/PROPH/DIAG IV INF INIT: CPT | Performed by: NURSE PRACTITIONER

## 2020-08-19 PROCEDURE — 25010000002 NATALIZUMAB PER 1 MG: Performed by: NURSE PRACTITIONER

## 2020-08-19 RX ORDER — DIPHENHYDRAMINE HCL 25 MG
25 CAPSULE ORAL ONCE
Status: CANCELLED | OUTPATIENT
Start: 2020-09-15

## 2020-08-19 RX ORDER — ONDANSETRON 4 MG/1
4 TABLET, ORALLY DISINTEGRATING ORAL ONCE
Status: DISCONTINUED | OUTPATIENT
Start: 2020-08-19 | End: 2020-08-19 | Stop reason: HOSPADM

## 2020-08-19 RX ORDER — SODIUM CHLORIDE 9 MG/ML
250 INJECTION, SOLUTION INTRAVENOUS ONCE
Status: COMPLETED | OUTPATIENT
Start: 2020-08-19 | End: 2020-08-19

## 2020-08-19 RX ORDER — LORATADINE 10 MG/1
10 TABLET ORAL DAILY
Status: DISCONTINUED | OUTPATIENT
Start: 2020-08-19 | End: 2020-08-19

## 2020-08-19 RX ORDER — LORATADINE 10 MG/1
10 TABLET ORAL DAILY
Status: CANCELLED
Start: 2020-09-15

## 2020-08-19 RX ORDER — ACETAMINOPHEN 325 MG/1
650 TABLET ORAL ONCE
Status: CANCELLED | OUTPATIENT
Start: 2020-09-15

## 2020-08-19 RX ORDER — DIPHENHYDRAMINE HYDROCHLORIDE 50 MG/ML
25 INJECTION INTRAMUSCULAR; INTRAVENOUS AS NEEDED
Status: CANCELLED
Start: 2020-09-15

## 2020-08-19 RX ORDER — IBUPROFEN 400 MG/1
400 TABLET ORAL AS NEEDED
Status: CANCELLED
Start: 2020-09-15

## 2020-08-19 RX ORDER — ONDANSETRON 4 MG/1
4 TABLET, ORALLY DISINTEGRATING ORAL ONCE
Status: CANCELLED
Start: 2020-09-15

## 2020-08-19 RX ORDER — ACETAMINOPHEN 325 MG/1
650 TABLET ORAL ONCE
Status: COMPLETED | OUTPATIENT
Start: 2020-08-19 | End: 2020-08-19

## 2020-08-19 RX ORDER — SODIUM CHLORIDE 9 MG/ML
250 INJECTION, SOLUTION INTRAVENOUS ONCE
Status: CANCELLED | OUTPATIENT
Start: 2020-09-15

## 2020-08-19 RX ORDER — ONDANSETRON 2 MG/ML
4 INJECTION INTRAMUSCULAR; INTRAVENOUS AS NEEDED
Status: CANCELLED
Start: 2020-09-15

## 2020-08-19 RX ADMIN — NATALIZUMAB 300 MG: 300 INJECTION INTRAVENOUS at 14:09

## 2020-08-19 RX ADMIN — SODIUM CHLORIDE 250 ML: 9 INJECTION, SOLUTION INTRAVENOUS at 14:07

## 2020-08-19 RX ADMIN — ACETAMINOPHEN 650 MG: 325 TABLET, FILM COATED ORAL at 14:07

## 2020-08-20 ENCOUNTER — OFFICE VISIT (OUTPATIENT)
Dept: FAMILY MEDICINE CLINIC | Facility: CLINIC | Age: 45
End: 2020-08-20

## 2020-08-20 ENCOUNTER — TELEPHONE (OUTPATIENT)
Dept: FAMILY MEDICINE CLINIC | Facility: CLINIC | Age: 45
End: 2020-08-20

## 2020-08-20 ENCOUNTER — LAB (OUTPATIENT)
Dept: LAB | Facility: HOSPITAL | Age: 45
End: 2020-08-20

## 2020-08-20 VITALS
RESPIRATION RATE: 19 BRPM | HEART RATE: 81 BPM | HEIGHT: 66 IN | DIASTOLIC BLOOD PRESSURE: 68 MMHG | TEMPERATURE: 97.4 F | BODY MASS INDEX: 42.11 KG/M2 | OXYGEN SATURATION: 97 % | WEIGHT: 262 LBS | SYSTOLIC BLOOD PRESSURE: 107 MMHG

## 2020-08-20 DIAGNOSIS — I10 ESSENTIAL HYPERTENSION: ICD-10-CM

## 2020-08-20 DIAGNOSIS — M79.672 BILATERAL FOOT PAIN: ICD-10-CM

## 2020-08-20 DIAGNOSIS — G35 MULTIPLE SCLEROSIS (HCC): Primary | ICD-10-CM

## 2020-08-20 DIAGNOSIS — Z13.29 SCREENING FOR THYROID DISORDER: ICD-10-CM

## 2020-08-20 DIAGNOSIS — Z12.31 ENCOUNTER FOR SCREENING MAMMOGRAM FOR MALIGNANT NEOPLASM OF BREAST: ICD-10-CM

## 2020-08-20 DIAGNOSIS — B37.9 YEAST INFECTION: ICD-10-CM

## 2020-08-20 DIAGNOSIS — M25.561 CHRONIC PAIN OF RIGHT KNEE: ICD-10-CM

## 2020-08-20 DIAGNOSIS — M79.671 BILATERAL FOOT PAIN: ICD-10-CM

## 2020-08-20 DIAGNOSIS — M54.41 LOW BACK PAIN WITH RIGHT-SIDED SCIATICA, UNSPECIFIED BACK PAIN LATERALITY, UNSPECIFIED CHRONICITY: ICD-10-CM

## 2020-08-20 DIAGNOSIS — Z13.0 SCREENING FOR DEFICIENCY ANEMIA: ICD-10-CM

## 2020-08-20 DIAGNOSIS — G89.29 CHRONIC PAIN OF RIGHT KNEE: ICD-10-CM

## 2020-08-20 DIAGNOSIS — Z79.890 HORMONE REPLACEMENT THERAPY: ICD-10-CM

## 2020-08-20 LAB
ALBUMIN SERPL-MCNC: 4.1 G/DL (ref 3.5–5.2)
ALBUMIN/GLOB SERPL: 1.3 G/DL
ALP SERPL-CCNC: 97 U/L (ref 39–117)
ALT SERPL W P-5'-P-CCNC: 12 U/L (ref 1–33)
ANION GAP SERPL CALCULATED.3IONS-SCNC: 12.7 MMOL/L (ref 5–15)
AST SERPL-CCNC: 20 U/L (ref 1–32)
BASOPHILS # BLD AUTO: 0.08 10*3/MM3 (ref 0–0.2)
BASOPHILS NFR BLD AUTO: 1.1 % (ref 0–1.5)
BILIRUB SERPL-MCNC: 0.3 MG/DL (ref 0–1.2)
BUN SERPL-MCNC: 13 MG/DL (ref 6–20)
BUN/CREAT SERPL: 13.3 (ref 7–25)
CALCIUM SPEC-SCNC: 9.9 MG/DL (ref 8.6–10.5)
CHLORIDE SERPL-SCNC: 110 MMOL/L (ref 98–107)
CO2 SERPL-SCNC: 20.3 MMOL/L (ref 22–29)
CREAT SERPL-MCNC: 0.98 MG/DL (ref 0.57–1)
DEPRECATED RDW RBC AUTO: 43.4 FL (ref 37–54)
EOSINOPHIL # BLD AUTO: 0.25 10*3/MM3 (ref 0–0.4)
EOSINOPHIL NFR BLD AUTO: 3.5 % (ref 0.3–6.2)
ERYTHROCYTE [DISTWIDTH] IN BLOOD BY AUTOMATED COUNT: 15.6 % (ref 12.3–15.4)
GFR SERPL CREATININE-BSD FRML MDRD: 75 ML/MIN/1.73
GLOBULIN UR ELPH-MCNC: 3.1 GM/DL
GLUCOSE SERPL-MCNC: 97 MG/DL (ref 65–99)
HCT VFR BLD AUTO: 35.6 % (ref 34–46.6)
HGB BLD-MCNC: 11.2 G/DL (ref 12–15.9)
IMM GRANULOCYTES # BLD AUTO: 0.02 10*3/MM3 (ref 0–0.05)
IMM GRANULOCYTES NFR BLD AUTO: 0.3 % (ref 0–0.5)
IRON 24H UR-MRATE: 47 MCG/DL (ref 37–145)
IRON SATN MFR SERPL: 13 % (ref 20–50)
LYMPHOCYTES # BLD AUTO: 2.3 10*3/MM3 (ref 0.7–3.1)
LYMPHOCYTES NFR BLD AUTO: 32.2 % (ref 19.6–45.3)
MCH RBC QN AUTO: 24.7 PG (ref 26.6–33)
MCHC RBC AUTO-ENTMCNC: 31.5 G/DL (ref 31.5–35.7)
MCV RBC AUTO: 78.6 FL (ref 79–97)
MONOCYTES # BLD AUTO: 0.59 10*3/MM3 (ref 0.1–0.9)
MONOCYTES NFR BLD AUTO: 8.3 % (ref 5–12)
NEUTROPHILS NFR BLD AUTO: 3.91 10*3/MM3 (ref 1.7–7)
NEUTROPHILS NFR BLD AUTO: 54.6 % (ref 42.7–76)
NRBC BLD AUTO-RTO: 0.7 /100 WBC (ref 0–0.2)
PLATELET # BLD AUTO: 304 10*3/MM3 (ref 140–450)
PMV BLD AUTO: 11.6 FL (ref 6–12)
POTASSIUM SERPL-SCNC: 4.1 MMOL/L (ref 3.5–5.2)
PROT SERPL-MCNC: 7.2 G/DL (ref 6–8.5)
RBC # BLD AUTO: 4.53 10*6/MM3 (ref 3.77–5.28)
SODIUM SERPL-SCNC: 143 MMOL/L (ref 136–145)
TIBC SERPL-MCNC: 374 MCG/DL (ref 298–536)
TRANSFERRIN SERPL-MCNC: 251 MG/DL (ref 200–360)
TSH SERPL DL<=0.05 MIU/L-ACNC: 1.3 UIU/ML (ref 0.27–4.2)
WBC # BLD AUTO: 7.15 10*3/MM3 (ref 3.4–10.8)

## 2020-08-20 PROCEDURE — 83540 ASSAY OF IRON: CPT | Performed by: NURSE PRACTITIONER

## 2020-08-20 PROCEDURE — 84443 ASSAY THYROID STIM HORMONE: CPT | Performed by: NURSE PRACTITIONER

## 2020-08-20 PROCEDURE — 84466 ASSAY OF TRANSFERRIN: CPT | Performed by: NURSE PRACTITIONER

## 2020-08-20 PROCEDURE — 85025 COMPLETE CBC W/AUTO DIFF WBC: CPT | Performed by: NURSE PRACTITIONER

## 2020-08-20 PROCEDURE — 99214 OFFICE O/P EST MOD 30 MIN: CPT | Performed by: NURSE PRACTITIONER

## 2020-08-20 PROCEDURE — 80053 COMPREHEN METABOLIC PANEL: CPT | Performed by: NURSE PRACTITIONER

## 2020-08-20 RX ORDER — ESTRADIOL 2 MG/1
2 TABLET ORAL DAILY
Qty: 30 TABLET | Refills: 5 | Status: SHIPPED | OUTPATIENT
Start: 2020-08-20 | End: 2021-03-05 | Stop reason: SINTOL

## 2020-08-20 RX ORDER — DILTIAZEM HYDROCHLORIDE 240 MG/1
240 CAPSULE, COATED, EXTENDED RELEASE ORAL DAILY
Qty: 30 CAPSULE | Refills: 5 | Status: SHIPPED | OUTPATIENT
Start: 2020-08-20 | End: 2021-09-27 | Stop reason: SDUPTHER

## 2020-08-20 RX ORDER — DICLOFENAC SODIUM 75 MG/1
75 TABLET, DELAYED RELEASE ORAL 2 TIMES DAILY
COMMUNITY
End: 2020-08-20 | Stop reason: SDUPTHER

## 2020-08-20 RX ORDER — HYDROCODONE BITARTRATE AND ACETAMINOPHEN 7.5; 325 MG/1; MG/1
1 TABLET ORAL EVERY 6 HOURS PRN
Qty: 30 TABLET | Refills: 0 | Status: SHIPPED | OUTPATIENT
Start: 2020-08-20 | End: 2020-09-03 | Stop reason: SDUPTHER

## 2020-08-20 RX ORDER — FLUCONAZOLE 150 MG/1
TABLET ORAL
Qty: 2 TABLET | Refills: 0 | Status: SHIPPED | OUTPATIENT
Start: 2020-08-20 | End: 2020-11-20

## 2020-08-20 RX ORDER — DICLOFENAC SODIUM 75 MG/1
75 TABLET, DELAYED RELEASE ORAL 2 TIMES DAILY
Qty: 60 TABLET | Refills: 5 | Status: SHIPPED | OUTPATIENT
Start: 2020-08-20 | End: 2022-06-28 | Stop reason: SINTOL

## 2020-08-20 NOTE — PATIENT INSTRUCTIONS
Calorie Counting for Weight Loss  Calories are units of energy. Your body needs a certain amount of calories from food to keep you going throughout the day. When you eat more calories than your body needs, your body stores the extra calories as fat. When you eat fewer calories than your body needs, your body burns fat to get the energy it needs.  Calorie counting means keeping track of how many calories you eat and drink each day. Calorie counting can be helpful if you need to lose weight. If you make sure to eat fewer calories than your body needs, you should lose weight. Ask your health care provider what a healthy weight is for you.  For calorie counting to work, you will need to eat the right number of calories in a day in order to lose a healthy amount of weight per week. A dietitian can help you determine how many calories you need in a day and will give you suggestions on how to reach your calorie goal.  · A healthy amount of weight to lose per week is usually 1-2 lb (0.5-0.9 kg). This usually means that your daily calorie intake should be reduced by 500-750 calories.  · Eating 1,200 - 1,500 calories per day can help most women lose weight.  · Eating 1,500 - 1,800 calories per day can help most men lose weight.  What is my plan?  My goal is to have __________ calories per day.  If I have this many calories per day, I should lose around __________ pounds per week.  What do I need to know about calorie counting?  In order to meet your daily calorie goal, you will need to:  · Find out how many calories are in each food you would like to eat. Try to do this before you eat.  · Decide how much of the food you plan to eat.  · Write down what you ate and how many calories it had. Doing this is called keeping a food log.  To successfully lose weight, it is important to balance calorie counting with a healthy lifestyle that includes regular activity. Aim for 150 minutes of moderate exercise (such as walking) or 75  minutes of vigorous exercise (such as running) each week.  Where do I find calorie information?    The number of calories in a food can be found on a Nutrition Facts label. If a food does not have a Nutrition Facts label, try to look up the calories online or ask your dietitian for help.  Remember that calories are listed per serving. If you choose to have more than one serving of a food, you will have to multiply the calories per serving by the amount of servings you plan to eat. For example, the label on a package of bread might say that a serving size is 1 slice and that there are 90 calories in a serving. If you eat 1 slice, you will have eaten 90 calories. If you eat 2 slices, you will have eaten 180 calories.  How do I keep a food log?  Immediately after each meal, record the following information in your food log:  · What you ate. Don't forget to include toppings, sauces, and other extras on the food.  · How much you ate. This can be measured in cups, ounces, or number of items.  · How many calories each food and drink had.  · The total number of calories in the meal.  Keep your food log near you, such as in a small notebook in your pocket, or use a mobile robert or website. Some programs will calculate calories for you and show you how many calories you have left for the day to meet your goal.  What are some calorie counting tips?    · Use your calories on foods and drinks that will fill you up and not leave you hungry:  ? Some examples of foods that fill you up are nuts and nut butters, vegetables, lean proteins, and high-fiber foods like whole grains. High-fiber foods are foods with more than 5 g fiber per serving.  ? Drinks such as sodas, specialty coffee drinks, alcohol, and juices have a lot of calories, yet do not fill you up.  · Eat nutritious foods and avoid empty calories. Empty calories are calories you get from foods or beverages that do not have many vitamins or protein, such as candy, sweets, and  "soda. It is better to have a nutritious high-calorie food (such as an avocado) than a food with few nutrients (such as a bag of chips).  · Know how many calories are in the foods you eat most often. This will help you calculate calorie counts faster.  · Pay attention to calories in drinks. Low-calorie drinks include water and unsweetened drinks.  · Pay attention to nutrition labels for \"low fat\" or \"fat free\" foods. These foods sometimes have the same amount of calories or more calories than the full fat versions. They also often have added sugar, starch, or salt, to make up for flavor that was removed with the fat.  · Find a way of tracking calories that works for you. Get creative. Try different apps or programs if writing down calories does not work for you.  What are some portion control tips?  · Know how many calories are in a serving. This will help you know how many servings of a certain food you can have.  · Use a measuring cup to measure serving sizes. You could also try weighing out portions on a kitchen scale. With time, you will be able to estimate serving sizes for some foods.  · Take some time to put servings of different foods on your favorite plates, bowls, and cups so you know what a serving looks like.  · Try not to eat straight from a bag or box. Doing this can lead to overeating. Put the amount you would like to eat in a cup or on a plate to make sure you are eating the right portion.  · Use smaller plates, glasses, and bowls to prevent overeating.  · Try not to multitask (for example, watch TV or use your computer) while eating. If it is time to eat, sit down at a table and enjoy your food. This will help you to know when you are full. It will also help you to be aware of what you are eating and how much you are eating.  What are tips for following this plan?  Reading food labels  · Check the calorie count compared to the serving size. The serving size may be smaller than what you are used to " "eating.  · Check the source of the calories. Make sure the food you are eating is high in vitamins and protein and low in saturated and trans fats.  Shopping  · Read nutrition labels while you shop. This will help you make healthy decisions before you decide to purchase your food.  · Make a grocery list and stick to it.  Cooking  · Try to cook your favorite foods in a healthier way. For example, try baking instead of frying.  · Use low-fat dairy products.  Meal planning  · Use more fruits and vegetables. Half of your plate should be fruits and vegetables.  · Include lean proteins like poultry and fish.  How do I count calories when eating out?  · Ask for smaller portion sizes.  · Consider sharing an entree and sides instead of getting your own entree.  · If you get your own entree, eat only half. Ask for a box at the beginning of your meal and put the rest of your entree in it so you are not tempted to eat it.  · If calories are listed on the menu, choose the lower calorie options.  · Choose dishes that include vegetables, fruits, whole grains, low-fat dairy products, and lean protein.  · Choose items that are boiled, broiled, grilled, or steamed. Stay away from items that are buttered, battered, fried, or served with cream sauce. Items labeled \"crispy\" are usually fried, unless stated otherwise.  · Choose water, low-fat milk, unsweetened iced tea, or other drinks without added sugar. If you want an alcoholic beverage, choose a lower calorie option such as a glass of wine or light beer.  · Ask for dressings, sauces, and syrups on the side. These are usually high in calories, so you should limit the amount you eat.  · If you want a salad, choose a garden salad and ask for grilled meats. Avoid extra toppings like salazar, cheese, or fried items. Ask for the dressing on the side, or ask for olive oil and vinegar or lemon to use as dressing.  · Estimate how many servings of a food you are given. For example, a serving of " cooked rice is ½ cup or about the size of half a baseball. Knowing serving sizes will help you be aware of how much food you are eating at restaurants. The list below tells you how big or small some common portion sizes are based on everyday objects:  ? 1 oz--4 stacked dice.  ? 3 oz--1 deck of cards.  ? 1 tsp--1 die.  ? 1 Tbsp--½ a ping-pong ball.  ? 2 Tbsp--1 ping-pong ball.  ? ½ cup--½ baseball.  ? 1 cup--1 baseball.  Summary  · Calorie counting means keeping track of how many calories you eat and drink each day. If you eat fewer calories than your body needs, you should lose weight.  · A healthy amount of weight to lose per week is usually 1-2 lb (0.5-0.9 kg). This usually means reducing your daily calorie intake by 500-750 calories.  · The number of calories in a food can be found on a Nutrition Facts label. If a food does not have a Nutrition Facts label, try to look up the calories online or ask your dietitian for help.  · Use your calories on foods and drinks that will fill you up, and not on foods and drinks that will leave you hungry.  · Use smaller plates, glasses, and bowls to prevent overeating.  This information is not intended to replace advice given to you by your health care provider. Make sure you discuss any questions you have with your health care provider.  Document Released: 12/18/2006 Document Revised: 09/06/2019 Document Reviewed: 11/17/2017  [employer] Patient Education © 2020 [employer]      Exercising to Lose Weight  Exercise is structured, repetitive physical activity to improve fitness and health. Getting regular exercise is important for everyone. It is especially important if you are overweight. Being overweight increases your risk of heart disease, stroke, diabetes, high blood pressure, and several types of cancer. Reducing your calorie intake and exercising can help you lose weight.  Exercise is usually categorized as moderate or vigorous intensity. To lose weight, most people need  to do a certain amount of moderate-intensity or vigorous-intensity exercise each week.  Moderate-intensity exercise    Moderate-intensity exercise is any activity that gets you moving enough to burn at least three times more energy (calories) than if you were sitting.  Examples of moderate exercise include:  · Walking a mile in 15 minutes.  · Doing light yard work.  · Biking at an easy pace.  Most people should get at least 150 minutes (2 hours and 30 minutes) a week of moderate-intensity exercise to maintain their body weight.  Vigorous-intensity exercise  Vigorous-intensity exercise is any activity that gets you moving enough to burn at least six times more calories than if you were sitting. When you exercise at this intensity, you should be working hard enough that you are not able to carry on a conversation.  Examples of vigorous exercise include:  · Running.  · Playing a team sport, such as football, basketball, and soccer.  · Jumping rope.  Most people should get at least 75 minutes (1 hour and 15 minutes) a week of vigorous-intensity exercise to maintain their body weight.  How can exercise affect me?  When you exercise enough to burn more calories than you eat, you lose weight. Exercise also reduces body fat and builds muscle. The more muscle you have, the more calories you burn. Exercise also:  · Improves mood.  · Reduces stress and tension.  · Improves your overall fitness, flexibility, and endurance.  · Increases bone strength.  The amount of exercise you need to lose weight depends on:  · Your age.  · The type of exercise.  · Any health conditions you have.  · Your overall physical ability.  Talk to your health care provider about how much exercise you need and what types of activities are safe for you.  What actions can I take to lose weight?  Nutrition    · Make changes to your diet as told by your health care provider or diet and nutrition specialist (dietitian). This may include:  ? Eating fewer  calories.  ? Eating more protein.  ? Eating less unhealthy fats.  ? Eating a diet that includes fresh fruits and vegetables, whole grains, low-fat dairy products, and lean protein.  ? Avoiding foods with added fat, salt, and sugar.  · Drink plenty of water while you exercise to prevent dehydration or heat stroke.  Activity  · Choose an activity that you enjoy and set realistic goals. Your health care provider can help you make an exercise plan that works for you.  · Exercise at a moderate or vigorous intensity most days of the week.  ? The intensity of exercise may vary from person to person. You can tell how intense a workout is for you by paying attention to your breathing and heartbeat. Most people will notice their breathing and heartbeat get faster with more intense exercise.  · Do resistance training twice each week, such as:  ? Push-ups.  ? Sit-ups.  ? Lifting weights.  ? Using resistance bands.  · Getting short amounts of exercise can be just as helpful as long structured periods of exercise. If you have trouble finding time to exercise, try to include exercise in your daily routine.  ? Get up, stretch, and walk around every 30 minutes throughout the day.  ? Go for a walk during your lunch break.  ? Park your car farther away from your destination.  ? If you take public transportation, get off one stop early and walk the rest of the way.  ? Make phone calls while standing up and walking around.  ? Take the stairs instead of elevators or escalators.  · Wear comfortable clothes and shoes with good support.  · Do not exercise so much that you hurt yourself, feel dizzy, or get very short of breath.  Where to find more information  · [place] Department of Health and Human Services: www.hhs.gov  · Centers for Disease Control and Prevention (CDC): www.cdc.gov  Contact a health care provider:  · Before starting a new exercise program.  · If you have questions or concerns about your weight.  · If you have a medical  problem that keeps you from exercising.  Get help right away if you have any of the following while exercising:  · Injury.  · Dizziness.  · Difficulty breathing or shortness of breath that does not go away when you stop exercising.  · Chest pain.  · Rapid heartbeat.  Summary  · Being overweight increases your risk of heart disease, stroke, diabetes, high blood pressure, and several types of cancer.  · Losing weight happens when you burn more calories than you eat.  · Reducing the amount of calories you eat in addition to getting regular moderate or vigorous exercise each week helps you lose weight.  This information is not intended to replace advice given to you by your health care provider. Make sure you discuss any questions you have with your health care provider.  Document Released: 01/20/2012 Document Revised: 12/31/2018 Document Reviewed: 12/31/2018  [employer] Patient Education © 2020 [employer]

## 2020-08-20 NOTE — PROGRESS NOTES
Subjective   Jenn Good is a 45 y.o. female.     Marybeth Frank age 45 in today to reestablish.  She has a history of MS and chronic kidney disease.  She does receive infusions which she states helps quite a bit.  But she has pain in her lower extremities particularly her knee.  She states that the pain is very severe and she takes NSAIDs on a very limited basis.  The pain is worse in the right leg than the left particularly in the knee area.  She had surgery on the right knee several years ago which sounds like she probably had a arthroscopy done.  The pain is worse at night to the point that she has difficulty sleeping.  She also has chronic yeast infections and does use Diflucan and Terazol for these.  Other history includes hypertension.    She says that she has chronic blisters on her feet possibly due to MS.  She thinks are getting better but she like to see a podiatrist.  She also needs to be scheduled for mammogram.  And she has not had any labs drawn in quite some time.    Multiple Sclerosis   This is a chronic problem. The current episode started more than 1 year ago. The problem occurs constantly. The problem has been waxing and waning. Associated symptoms include arthralgias and myalgias. Pertinent negatives include no abdominal pain, anorexia, change in bowel habit, chest pain, chills, congestion, coughing, diaphoresis, fatigue, fever, headaches, joint swelling, nausea, neck pain, numbness, rash, sore throat, swollen glands, urinary symptoms, vertigo, visual change, vomiting or weakness. The symptoms are aggravated by exertion and stress. Treatments tried: infusions given by her specialist. The treatment provided moderate relief.   Leg Pain    The incident occurred more than 1 week ago (Chronic worse on the right than the left). There was no injury mechanism. The pain is present in the left leg, left knee, right leg and right knee. The quality of the pain is described as aching. The pain is at a  severity of 8/10. The pain is severe. The pain has been constant since onset. Associated symptoms include muscle weakness. Pertinent negatives include no inability to bear weight, loss of motion, loss of sensation, numbness or tingling. She reports no foreign bodies present. The symptoms are aggravated by weight bearing and movement. She has tried NSAIDs and non-weight bearing (Hydrocodone) for the symptoms. The treatment provided significant relief.        The following portions of the patient's history were reviewed and updated as appropriate: allergies, current medications, past family history, past medical history, past social history, past surgical history and problem list.    Review of Systems   Constitutional: Negative.  Negative for chills, diaphoresis, fatigue and fever.   HENT: Negative.  Negative for congestion, sore throat and swollen glands.    Eyes: Negative.    Respiratory: Negative.  Negative for cough.    Cardiovascular: Negative.  Negative for chest pain.   Gastrointestinal: Negative.  Negative for abdominal pain, anorexia, change in bowel habit, nausea and vomiting.   Endocrine: Negative.    Genitourinary: Negative.    Musculoskeletal: Positive for arthralgias and myalgias. Negative for joint swelling and neck pain.   Skin: Negative.  Negative for rash.   Allergic/Immunologic: Negative.    Neurological: Negative.  Negative for vertigo, tingling, weakness and numbness.   Hematological: Negative.    Psychiatric/Behavioral: Negative.        Objective   Physical Exam   Constitutional: She is oriented to person, place, and time. She appears well-developed and well-nourished. No distress.   HENT:   Head: Normocephalic and atraumatic.   Mouth/Throat: No oropharyngeal exudate.   Eyes: Pupils are equal, round, and reactive to light.   Neck: Normal range of motion. Neck supple. No thyromegaly present.   Cardiovascular: Normal rate, regular rhythm and normal heart sounds. Exam reveals no friction rub.   No  murmur heard.  Pulmonary/Chest: Effort normal and breath sounds normal. No respiratory distress. She has no wheezes. She has no rales.   Abdominal: Soft.   Musculoskeletal:        Right knee: She exhibits decreased range of motion and swelling. Tenderness found.   X-rays of the right knee are reviewed and there might be a small amount of narrowing other than that it appears to be normal.   Neurological: She is alert and oriented to person, place, and time.   Skin: Skin is warm and dry.   Psychiatric: She has a normal mood and affect. Thought content normal.   Nursing note and vitals reviewed.        Assessment/Plan   Jenn was seen today for establish care, multiple sclerosis, chronic kidney disease and right leg pain.    Diagnoses and all orders for this visit:    Multiple sclerosis (CMS/HCC)  -     HYDROcodone-acetaminophen (NORCO) 7.5-325 MG per tablet; Take 1 tablet by mouth Every 6 (Six) Hours As Needed for Moderate Pain .    Essential hypertension  -     dilTIAZem CD (Cartia XT) 240 MG 24 hr capsule; Take 1 capsule by mouth Daily.  -     Comprehensive metabolic panel    Hormone replacement therapy  -     estradiol (ESTRACE) 2 MG tablet; Take 1 tablet by mouth Daily.    Low back pain with right-sided sciatica, unspecified back pain laterality, unspecified chronicity  -     diclofenac (VOLTAREN) 75 MG EC tablet; Take 1 tablet by mouth 2 (Two) Times a Day.    Yeast infection  -     fluconazole (Diflucan) 150 MG tablet; 1 tab po x 1 now, may repeat in 4 days prn yeast  -     terconazole (Terazol 7) 0.4 % vaginal cream; Insert 1 applicator into the vagina Every Night.    Chronic pain of right knee  -     XR Knee 1 or 2 View Right (In Office)  -     HYDROcodone-acetaminophen (NORCO) 7.5-325 MG per tablet; Take 1 tablet by mouth Every 6 (Six) Hours As Needed for Moderate Pain .    Screening for thyroid disorder  -     TSH    Screening for deficiency anemia  -     CBC & Differential  -     Iron and TIBC  -     CBC  Auto Differential    Bilateral foot pain  -     Ambulatory Referral to Podiatry    Encounter for screening mammogram for malignant neoplasm of breast   -     Mammo Screening Bilateral With CAD; Future      Is noted to be 42.3 which is considered obese.  Diet and exercise information will be provided to this patient.    Per report #[id number] is reviewed.

## 2020-08-26 DIAGNOSIS — G35 MULTIPLE SCLEROSIS (HCC): Primary | ICD-10-CM

## 2020-09-01 DIAGNOSIS — G35 MULTIPLE SCLEROSIS (HCC): ICD-10-CM

## 2020-09-01 DIAGNOSIS — G89.29 CHRONIC PAIN OF RIGHT KNEE: ICD-10-CM

## 2020-09-01 DIAGNOSIS — M25.561 CHRONIC PAIN OF RIGHT KNEE: ICD-10-CM

## 2020-09-01 NOTE — TELEPHONE ENCOUNTER
PATIENT IS REQUESTING REFILL FOR HYDROcodone-acetaminophen (NORCO) 7.5-325 MG per tablet. CONFIRMED PHARMACY AS [address] - [phone]  - [phone]   [phone]

## 2020-09-03 DIAGNOSIS — M25.561 CHRONIC PAIN OF RIGHT KNEE: ICD-10-CM

## 2020-09-03 DIAGNOSIS — G89.29 CHRONIC PAIN OF RIGHT KNEE: ICD-10-CM

## 2020-09-03 DIAGNOSIS — G35 MULTIPLE SCLEROSIS (HCC): ICD-10-CM

## 2020-09-03 RX ORDER — HYDROCODONE BITARTRATE AND ACETAMINOPHEN 7.5; 325 MG/1; MG/1
1 TABLET ORAL EVERY 8 HOURS PRN
Qty: 90 TABLET | Refills: 0 | Status: SHIPPED | OUTPATIENT
Start: 2020-09-03 | End: 2020-10-12 | Stop reason: SDUPTHER

## 2020-09-03 RX ORDER — HYDROCODONE BITARTRATE AND ACETAMINOPHEN 7.5; 325 MG/1; MG/1
1 TABLET ORAL EVERY 6 HOURS PRN
Qty: 30 TABLET | Refills: 0 | Status: CANCELLED | OUTPATIENT
Start: 2020-09-03

## 2020-09-03 NOTE — TELEPHONE ENCOUNTER
Spoke with pt and she stated that she has been taking 4 per day but she could get by with 3 per day.

## 2020-09-03 NOTE — TELEPHONE ENCOUNTER
Samra if you could call and ask her how many she is being to take, that would be helpful.  She is taking it for the pain of her MS, however I would like for her to keep it down to a day if possible.  Once you call her if you put the order back in for the Norco then I will go ahead and send it in.  Thanks Samra I appreciate it.

## 2020-09-16 ENCOUNTER — INFUSION (OUTPATIENT)
Dept: ONCOLOGY | Facility: HOSPITAL | Age: 45
End: 2020-09-16

## 2020-09-16 VITALS
HEART RATE: 85 BPM | SYSTOLIC BLOOD PRESSURE: 140 MMHG | RESPIRATION RATE: 18 BRPM | TEMPERATURE: 97.4 F | DIASTOLIC BLOOD PRESSURE: 75 MMHG

## 2020-09-16 DIAGNOSIS — G35 MULTIPLE SCLEROSIS (HCC): Primary | ICD-10-CM

## 2020-09-16 LAB
ALBUMIN SERPL-MCNC: 4.2 G/DL (ref 3.5–5.2)
ALP SERPL-CCNC: 97 U/L (ref 39–117)
ALT SERPL W P-5'-P-CCNC: 12 U/L (ref 1–33)
AST SERPL-CCNC: 20 U/L (ref 1–32)
BASOPHILS # BLD AUTO: 0.1 10*3/MM3 (ref 0–0.2)
BASOPHILS NFR BLD AUTO: 1.3 % (ref 0–1.5)
BILIRUB CONJ SERPL-MCNC: <0.2 MG/DL (ref 0–0.3)
BILIRUB INDIRECT SERPL-MCNC: NORMAL MG/DL
BILIRUB SERPL-MCNC: 0.3 MG/DL (ref 0–1.2)
DEPRECATED RDW RBC AUTO: 49.2 FL (ref 37–54)
EOSINOPHIL # BLD AUTO: 0.46 10*3/MM3 (ref 0–0.4)
EOSINOPHIL NFR BLD AUTO: 6 % (ref 0.3–6.2)
ERYTHROCYTE [DISTWIDTH] IN BLOOD BY AUTOMATED COUNT: 17.2 % (ref 12.3–15.4)
HCT VFR BLD AUTO: 37.6 % (ref 34–46.6)
HGB BLD-MCNC: 11.6 G/DL (ref 12–15.9)
IMM GRANULOCYTES # BLD AUTO: 0.03 10*3/MM3 (ref 0–0.05)
IMM GRANULOCYTES NFR BLD AUTO: 0.4 % (ref 0–0.5)
LYMPHOCYTES # BLD AUTO: 3.46 10*3/MM3 (ref 0.7–3.1)
LYMPHOCYTES NFR BLD AUTO: 44.8 % (ref 19.6–45.3)
MCH RBC QN AUTO: 24.9 PG (ref 26.6–33)
MCHC RBC AUTO-ENTMCNC: 30.9 G/DL (ref 31.5–35.7)
MCV RBC AUTO: 80.9 FL (ref 79–97)
MONOCYTES # BLD AUTO: 0.59 10*3/MM3 (ref 0.1–0.9)
MONOCYTES NFR BLD AUTO: 7.6 % (ref 5–12)
NEUTROPHILS NFR BLD AUTO: 3.09 10*3/MM3 (ref 1.7–7)
NEUTROPHILS NFR BLD AUTO: 39.9 % (ref 42.7–76)
NRBC BLD AUTO-RTO: 0.6 /100 WBC (ref 0–0.2)
PLATELET # BLD AUTO: 314 10*3/MM3 (ref 140–450)
PMV BLD AUTO: 10.5 FL (ref 6–12)
PROT SERPL-MCNC: 7.1 G/DL (ref 6–8.5)
RBC # BLD AUTO: 4.65 10*6/MM3 (ref 3.77–5.28)
WBC # BLD AUTO: 7.73 10*3/MM3 (ref 3.4–10.8)

## 2020-09-16 PROCEDURE — 85025 COMPLETE CBC W/AUTO DIFF WBC: CPT

## 2020-09-16 PROCEDURE — 25010000002 NATALIZUMAB PER 1 MG: Performed by: NURSE PRACTITIONER

## 2020-09-16 PROCEDURE — 80076 HEPATIC FUNCTION PANEL: CPT

## 2020-09-16 PROCEDURE — 96365 THER/PROPH/DIAG IV INF INIT: CPT | Performed by: NURSE PRACTITIONER

## 2020-09-16 RX ORDER — ONDANSETRON 2 MG/ML
4 INJECTION INTRAMUSCULAR; INTRAVENOUS AS NEEDED
Status: CANCELLED
Start: 2020-10-14

## 2020-09-16 RX ORDER — DIPHENHYDRAMINE HYDROCHLORIDE 50 MG/ML
25 INJECTION INTRAMUSCULAR; INTRAVENOUS AS NEEDED
Status: CANCELLED
Start: 2020-09-16

## 2020-09-16 RX ORDER — ONDANSETRON 4 MG/1
8 TABLET, ORALLY DISINTEGRATING ORAL ONCE
Status: DISCONTINUED | OUTPATIENT
Start: 2020-09-16 | End: 2020-09-16 | Stop reason: HOSPADM

## 2020-09-16 RX ORDER — ONDANSETRON 4 MG/1
8 TABLET, ORALLY DISINTEGRATING ORAL ONCE
Status: CANCELLED
Start: 2020-10-14

## 2020-09-16 RX ORDER — ONDANSETRON 8 MG/1
8 TABLET, ORALLY DISINTEGRATING ORAL ONCE
Status: CANCELLED
Start: 2020-09-16

## 2020-09-16 RX ORDER — LORATADINE 10 MG/1
10 TABLET ORAL DAILY
Status: DISCONTINUED | OUTPATIENT
Start: 2020-09-16 | End: 2020-09-16

## 2020-09-16 RX ORDER — SODIUM CHLORIDE 9 MG/ML
250 INJECTION, SOLUTION INTRAVENOUS ONCE
Status: CANCELLED | OUTPATIENT
Start: 2020-09-16

## 2020-09-16 RX ORDER — DIPHENHYDRAMINE HYDROCHLORIDE 50 MG/ML
25 INJECTION INTRAMUSCULAR; INTRAVENOUS AS NEEDED
Status: CANCELLED
Start: 2020-10-14

## 2020-09-16 RX ORDER — SODIUM CHLORIDE 9 MG/ML
250 INJECTION, SOLUTION INTRAVENOUS ONCE
Status: COMPLETED | OUTPATIENT
Start: 2020-09-16 | End: 2020-09-16

## 2020-09-16 RX ORDER — LORATADINE 10 MG/1
10 TABLET ORAL DAILY
Status: CANCELLED
Start: 2020-09-16

## 2020-09-16 RX ORDER — SODIUM CHLORIDE 9 MG/ML
250 INJECTION, SOLUTION INTRAVENOUS ONCE
Status: CANCELLED | OUTPATIENT
Start: 2020-10-14

## 2020-09-16 RX ORDER — ACETAMINOPHEN 325 MG/1
650 TABLET ORAL ONCE
Status: CANCELLED | OUTPATIENT
Start: 2020-09-16

## 2020-09-16 RX ORDER — ACETAMINOPHEN 325 MG/1
650 TABLET ORAL ONCE
Status: CANCELLED | OUTPATIENT
Start: 2020-10-14

## 2020-09-16 RX ORDER — ACETAMINOPHEN 325 MG/1
650 TABLET ORAL ONCE
Status: COMPLETED | OUTPATIENT
Start: 2020-09-16 | End: 2020-09-16

## 2020-09-16 RX ORDER — LORATADINE 10 MG/1
10 TABLET ORAL DAILY
Status: CANCELLED
Start: 2020-10-14

## 2020-09-16 RX ORDER — ONDANSETRON 2 MG/ML
4 INJECTION INTRAMUSCULAR; INTRAVENOUS AS NEEDED
Status: CANCELLED
Start: 2020-09-16

## 2020-09-16 RX ADMIN — NATALIZUMAB 300 MG: 300 INJECTION INTRAVENOUS at 13:42

## 2020-09-16 RX ADMIN — SODIUM CHLORIDE 250 ML: 9 INJECTION, SOLUTION INTRAVENOUS at 13:42

## 2020-09-16 RX ADMIN — ACETAMINOPHEN 650 MG: 325 TABLET, FILM COATED ORAL at 13:42

## 2020-09-21 ENCOUNTER — OFFICE VISIT (OUTPATIENT)
Dept: PODIATRY | Facility: CLINIC | Age: 45
End: 2020-09-21

## 2020-09-21 VITALS — OXYGEN SATURATION: 99 % | HEART RATE: 67 BPM | WEIGHT: 262 LBS | BODY MASS INDEX: 42.11 KG/M2 | HEIGHT: 66 IN

## 2020-09-21 DIAGNOSIS — M25.571 BILATERAL ANKLE PAIN, UNSPECIFIED CHRONICITY: Primary | ICD-10-CM

## 2020-09-21 DIAGNOSIS — M79.672 FOOT PAIN, BILATERAL: ICD-10-CM

## 2020-09-21 DIAGNOSIS — M21.41 PES PLANUS OF BOTH FEET: ICD-10-CM

## 2020-09-21 DIAGNOSIS — M79.671 FOOT PAIN, BILATERAL: ICD-10-CM

## 2020-09-21 DIAGNOSIS — M21.42 PES PLANUS OF BOTH FEET: ICD-10-CM

## 2020-09-21 DIAGNOSIS — M25.572 BILATERAL ANKLE PAIN, UNSPECIFIED CHRONICITY: Primary | ICD-10-CM

## 2020-09-21 PROCEDURE — 99203 OFFICE O/P NEW LOW 30 MIN: CPT | Performed by: PODIATRIST

## 2020-09-21 NOTE — PROGRESS NOTES
Jenn Good  1975  45 y.o. female     Patient came to clinic for concern of bilateral ankle pain and possible blisters on feet states her pain is 7/10    09/21/2020    Chief Complaint   Patient presents with   • Left Foot - Pain   • Right Foot - Pain       History of Present Illness    Jenn Good is a 45 y.o.female who presents to clinic today with chief complaint of foot and ankle pain.  Pain has been present off and on for the last several years.  She does carry a diagnosis of multiple sclerosis and attributes some of her pain to this.  Currently she rates her pain as a 7 out of 10.  Pain is aggravated with prolonged weightbearing.  Pain is relieved with rest.  There are no associated injuries.  She does relate to recently being on vacation and developing blisters on both of her feet after walking an extended period of time.  The blisters are healed at this time.    Past Medical History:   Diagnosis Date   • Abdominal pain, right upper quadrant    • Acid reflux    • Acute maxillary sinusitis    • Acute otitis media, left    • Acute pharyngitis    • Acute sinusitis    • Acute upper respiratory infection    • Allergic rhinitis    • Anxiety    • Anxiety state    • Backache    • Breast tenderness    • Bronchitis    • Callus    • Candidiasis of mouth    • Constipation    • Depression    • Dysuria    • Essential hypertension    • GERD (gastroesophageal reflux disease)    • High blood pressure    • Hypertension    • Impacted cerumen    • Ingrown toenail    • Insomnia    • Local infection of the skin and subcutaneous tissue, unspecified     R external ear      • Low back pain     with radiculopathy L buttock and posterior leg      • Malaise and fatigue    • Migraine with aura    • Migraines    • MS (multiple sclerosis) (CMS/HCC)    • Multiple sclerosis (CMS/HCC)    • Palpitations    • Tinea corporis    • Vaginal discharge    • Vulvovaginitis     yeast infection         Past Surgical History:   Procedure  Laterality Date   •  SECTION     • HYSTERECTOMY     • INJECTION OF MEDICATION  2016    Celestone (betamethasone) (1)      • INJECTION OF MEDICATION  05/10/2016    Rocephin (1)      • INJECTION OF MEDICATION  2015    Toradol (1)      • INJECTION OF MEDICATION  2015    Zofran (1)      • TUBAL ABDOMINAL LIGATION      Examination under anesthesia and laparoscopic tubal.   • UPPER GASTROINTESTINAL ENDOSCOPY  2015    Hiatal hernia. Gastritis. Unspecified gastric ulcer. Performed at [facility].         Family History   Problem Relation Age of Onset   • Cancer Maternal Grandmother    • Cancer Maternal Grandfather    • Cancer Paternal Grandmother    • Cancer Paternal Grandfather    • Breast cancer Other        Allergies   Allergen Reactions   • Adhesive Tape Itching and Dermatitis     Can not use IV tape, tegaderm--needs paper tape   • Lisinopril Cough   • Tamiflu [Oseltamivir Phosphate] Rash       Social History     Socioeconomic History   • Marital status: Single     Spouse name: Not on file   • Number of children: Not on file   • Years of education: Not on file   • Highest education level: Not on file   Tobacco Use   • Smoking status: Never Smoker   • Smokeless tobacco: Never Used   Substance and Sexual Activity   • Alcohol use: Yes     Comment: pt states she drinks about 2 a week   • Drug use: No   • Sexual activity: Defer         Current Outpatient Medications   Medication Sig Dispense Refill   • amphetamine-dextroamphetamine XR (ADDERALL XR) 10 MG 24 hr capsule Take 10 mg by mouth Every Morning.     • baclofen (LIORESAL) 20 MG tablet      • diclofenac (VOLTAREN) 75 MG EC tablet Take 1 tablet by mouth 2 (Two) Times a Day. 60 tablet 5   • dilTIAZem CD (Cartia XT) 240 MG 24 hr capsule Take 1 capsule by mouth Daily. 30 capsule 5   • docusate sodium (COLACE) 100 MG capsule Take 1 capsule by mouth 2 (Two) Times a Day. 60 capsule 0   • DULoxetine (CYMBALTA) 30 MG capsule Take 30 mg  "by mouth Daily.     • estradiol (ESTRACE) 2 MG tablet Take 1 tablet by mouth Daily. 30 tablet 5   • fluconazole (Diflucan) 150 MG tablet 1 tab po x 1 now, may repeat in 4 days prn yeast 2 tablet 0   • fluticasone (FLONASE) 50 MCG/ACT nasal spray 2 sprays into the nostril(s) as directed by provider Daily. 16 g 0   • HYDROcodone-acetaminophen (NORCO) 7.5-325 MG per tablet Take 1 tablet by mouth Every 8 (Eight) Hours As Needed for Moderate Pain . 90 tablet 0   • melatonin 5 MG tablet tablet Take 5 mg by mouth.     • natalizumab (TYSABRI) 300 MG/15ML injection Infuse  into a venous catheter.     • pregabalin (LYRICA) 100 MG capsule Take 100 mg by mouth 2 (Two) Times a Day.     • SUMAtriptan (IMITREX) 50 MG tablet      • telmisartan (MICARDIS) 40 MG tablet Take 40 mg by mouth Daily.  3   • terconazole (Terazol 7) 0.4 % vaginal cream Insert 1 applicator into the vagina Every Night. 45 g 3   • tiZANidine (ZANAFLEX) 4 MG tablet Take 4 mg by mouth At Night As Needed for muscle spasms.     • traZODone (DESYREL) 150 MG tablet Take 1 tablet by mouth Every Night. 30 tablet 5     No current facility-administered medications for this visit.        Review of Systems   Constitutional: Positive for fatigue.   Eyes: Positive for visual disturbance.   Respiratory: Negative.    Cardiovascular: Negative.    Gastrointestinal: Negative.    Endocrine: Negative.    Genitourinary: Negative.    Musculoskeletal: Positive for arthralgias and back pain.        Ankle pain    Skin: Negative.    Allergic/Immunologic: Negative.    Neurological: Positive for dizziness, numbness and headaches.   Hematological: Negative.    Psychiatric/Behavioral: Positive for confusion.        Depression          OBJECTIVE    Pulse 67   Ht 167.6 cm (66\")   Wt 119 kg (262 lb)   LMP  (LMP Unknown)   SpO2 99%   BMI 42.29 kg/m²       Physical Exam  Vitals signs reviewed.   Constitutional:       General: She is not in acute distress.     Appearance: She is " well-developed.   HENT:      Head: Normocephalic and atraumatic.      Nose: Nose normal.   Eyes:      Conjunctiva/sclera: Conjunctivae normal.      Pupils: Pupils are equal, round, and reactive to light.   Pulmonary:      Effort: Pulmonary effort is normal. No respiratory distress.      Breath sounds: No wheezing.   Musculoskeletal: Normal range of motion.         General: No deformity.   Skin:     General: Skin is warm and dry.      Capillary Refill: Capillary refill takes less than 2 seconds.   Neurological:      Mental Status: She is alert and oriented to person, place, and time.   Psychiatric:         Behavior: Behavior normal.         Thought Content: Thought content normal.       Lower Extremity    Cardiovascular:    DP/PT pulses palpable bilateral  CFT brisk  to all digits  Skin temp is warm to warm from proximal tibia to distal digits bilateral  No erythema or edema noted   Musculoskeletal:  Muscle strength is 5/5 for all muscle groups tested   ROM of the 1st MTP is WNL bilateral   ROM of the MTJ is WNL bilateral   ROM of the STJ is WNL bilateral   ROM of the ankle joint is  WNL bilateral   No pain on palpation noted bilateral  Pes planus bilateral  Dermatological:   Skin is warm, dry and intact bilateral  Webspaces 1-4 bilateral are clean, dry and intact.   No subcutaneous nodules or masses noted    Neurological:   Protective sensation intact   Sensation intact to light touch        Procedures        ASSESSMENT AND PLAN    Jenn was seen today for pain and pain.    Diagnoses and all orders for this visit:    Bilateral ankle pain, unspecified chronicity  -     XR ankle 3+ vw bilateral; Future    Foot pain, bilateral    Pes planus of both feet      - Comprehensive foot and ankle exam performed.   -Radiographs taken and reviewed.  No acute osseous or articular abnormalities.  -Etiology and treatment of adult acquired flatfoot discussed in detail with the patient.  Rx for custom orthotics.  Advised patient on  shoe gear  - All questions were answered to the patients satisfaction.  - RTC as needed          This document has been electronically signed by Jovanny Garcia DPM on September 27, 2020 12:52 CDT     9/27/2020  12:52 CDT

## 2020-09-24 LAB
CONV INDEX VALUE: 0.35
INTERPRETATION: ABNORMAL
INTERPRETATION: ABNORMAL
JCV ANTIBODY BY INHIBITION: POSITIVE
JCV ANTIBODY: ABNORMAL

## 2020-10-01 ENCOUNTER — RESULTS ENCOUNTER (OUTPATIENT)
Dept: ONCOLOGY | Facility: HOSPITAL | Age: 45
End: 2020-10-01

## 2020-10-01 DIAGNOSIS — G35 MULTIPLE SCLEROSIS (HCC): ICD-10-CM

## 2020-10-05 ENCOUNTER — TRANSCRIBE ORDERS (OUTPATIENT)
Dept: PODIATRY | Facility: CLINIC | Age: 45
End: 2020-10-05

## 2020-10-05 DIAGNOSIS — M72.2 PLANTAR FASCIITIS, BILATERAL: Primary | ICD-10-CM

## 2020-10-12 DIAGNOSIS — G89.29 CHRONIC PAIN OF RIGHT KNEE: ICD-10-CM

## 2020-10-12 DIAGNOSIS — G35 MULTIPLE SCLEROSIS (HCC): ICD-10-CM

## 2020-10-12 DIAGNOSIS — M25.561 CHRONIC PAIN OF RIGHT KNEE: ICD-10-CM

## 2020-10-12 RX ORDER — HYDROCODONE BITARTRATE AND ACETAMINOPHEN 7.5; 325 MG/1; MG/1
1 TABLET ORAL EVERY 8 HOURS PRN
Qty: 90 TABLET | Refills: 0 | Status: SHIPPED | OUTPATIENT
Start: 2020-10-12 | End: 2020-11-16 | Stop reason: SDUPTHER

## 2020-10-12 NOTE — TELEPHONE ENCOUNTER
Caller: Jenn Good    Relationship: Self    Best call back number: [phone]    Medication needed:   Requested Prescriptions     Pending Prescriptions Disp Refills   • HYDROcodone-acetaminophen (NORCO) 7.5-325 MG per tablet 90 tablet 0     Sig: Take 1 tablet by mouth Every 8 (Eight) Hours As Needed for Moderate Pain .       When do you need the refill by: 10/13/2020    What details did the patient provide when requesting the medication:      Does the patient have less than a 3 day supply:  [x] Yes  [] No    What is the patient's preferred pharmacy: [facility] [address] [place] [address] [phone] [facility] [phone]

## 2020-10-14 ENCOUNTER — INFUSION (OUTPATIENT)
Dept: ONCOLOGY | Facility: HOSPITAL | Age: 45
End: 2020-10-14

## 2020-10-14 VITALS — HEART RATE: 81 BPM | SYSTOLIC BLOOD PRESSURE: 139 MMHG | TEMPERATURE: 97.5 F | DIASTOLIC BLOOD PRESSURE: 78 MMHG

## 2020-10-14 DIAGNOSIS — G35 MULTIPLE SCLEROSIS (HCC): Primary | ICD-10-CM

## 2020-10-14 DIAGNOSIS — Z51.81 ENCOUNTER FOR THERAPEUTIC DRUG MONITORING: ICD-10-CM

## 2020-10-14 LAB
ALBUMIN SERPL-MCNC: 4.1 G/DL (ref 3.5–5.2)
ALP SERPL-CCNC: 109 U/L (ref 39–117)
ALT SERPL W P-5'-P-CCNC: 14 U/L (ref 1–33)
AST SERPL-CCNC: 19 U/L (ref 1–32)
BASOPHILS # BLD AUTO: 0.09 10*3/MM3 (ref 0–0.2)
BASOPHILS NFR BLD AUTO: 1.2 % (ref 0–1.5)
BILIRUB CONJ SERPL-MCNC: <0.2 MG/DL (ref 0–0.3)
BILIRUB INDIRECT SERPL-MCNC: NORMAL MG/DL
BILIRUB SERPL-MCNC: 0.4 MG/DL (ref 0–1.2)
DEPRECATED RDW RBC AUTO: 51.5 FL (ref 37–54)
EOSINOPHIL # BLD AUTO: 0.25 10*3/MM3 (ref 0–0.4)
EOSINOPHIL NFR BLD AUTO: 3.3 % (ref 0.3–6.2)
ERYTHROCYTE [DISTWIDTH] IN BLOOD BY AUTOMATED COUNT: 17.5 % (ref 12.3–15.4)
HCT VFR BLD AUTO: 35.2 % (ref 34–46.6)
HGB BLD-MCNC: 11.1 G/DL (ref 12–15.9)
IMM GRANULOCYTES # BLD AUTO: 0.01 10*3/MM3 (ref 0–0.05)
IMM GRANULOCYTES NFR BLD AUTO: 0.1 % (ref 0–0.5)
LYMPHOCYTES # BLD AUTO: 3.31 10*3/MM3 (ref 0.7–3.1)
LYMPHOCYTES NFR BLD AUTO: 43.7 % (ref 19.6–45.3)
MCH RBC QN AUTO: 25.3 PG (ref 26.6–33)
MCHC RBC AUTO-ENTMCNC: 31.5 G/DL (ref 31.5–35.7)
MCV RBC AUTO: 80.4 FL (ref 79–97)
MONOCYTES # BLD AUTO: 0.57 10*3/MM3 (ref 0.1–0.9)
MONOCYTES NFR BLD AUTO: 7.5 % (ref 5–12)
NEUTROPHILS NFR BLD AUTO: 3.34 10*3/MM3 (ref 1.7–7)
NEUTROPHILS NFR BLD AUTO: 44.2 % (ref 42.7–76)
NRBC BLD AUTO-RTO: 0.4 /100 WBC (ref 0–0.2)
PLATELET # BLD AUTO: 285 10*3/MM3 (ref 140–450)
PMV BLD AUTO: 10.5 FL (ref 6–12)
PROT SERPL-MCNC: 6.9 G/DL (ref 6–8.5)
RBC # BLD AUTO: 4.38 10*6/MM3 (ref 3.77–5.28)
WBC # BLD AUTO: 7.57 10*3/MM3 (ref 3.4–10.8)

## 2020-10-14 PROCEDURE — 85025 COMPLETE CBC W/AUTO DIFF WBC: CPT | Performed by: NURSE PRACTITIONER

## 2020-10-14 PROCEDURE — 25010000002 NATALIZUMAB PER 1 MG: Performed by: NURSE PRACTITIONER

## 2020-10-14 PROCEDURE — 80076 HEPATIC FUNCTION PANEL: CPT | Performed by: NURSE PRACTITIONER

## 2020-10-14 PROCEDURE — 96365 THER/PROPH/DIAG IV INF INIT: CPT | Performed by: NURSE PRACTITIONER

## 2020-10-14 RX ORDER — DIPHENHYDRAMINE HYDROCHLORIDE 50 MG/ML
25 INJECTION INTRAMUSCULAR; INTRAVENOUS AS NEEDED
Status: CANCELLED
Start: 2020-11-11

## 2020-10-14 RX ORDER — SODIUM CHLORIDE 9 MG/ML
250 INJECTION, SOLUTION INTRAVENOUS ONCE
Status: CANCELLED | OUTPATIENT
Start: 2020-11-11

## 2020-10-14 RX ORDER — ACETAMINOPHEN 325 MG/1
650 TABLET ORAL ONCE
Status: CANCELLED | OUTPATIENT
Start: 2020-11-11

## 2020-10-14 RX ORDER — LORATADINE 10 MG/1
10 TABLET ORAL DAILY
Status: CANCELLED
Start: 2020-11-11

## 2020-10-14 RX ORDER — ACETAMINOPHEN 325 MG/1
650 TABLET ORAL ONCE
Status: DISCONTINUED | OUTPATIENT
Start: 2020-10-14 | End: 2020-10-14 | Stop reason: HOSPADM

## 2020-10-14 RX ORDER — ONDANSETRON 4 MG/1
8 TABLET, ORALLY DISINTEGRATING ORAL ONCE
Status: CANCELLED
Start: 2020-11-11

## 2020-10-14 RX ORDER — ONDANSETRON 4 MG/1
8 TABLET, ORALLY DISINTEGRATING ORAL ONCE
Status: DISCONTINUED | OUTPATIENT
Start: 2020-10-14 | End: 2020-10-14

## 2020-10-14 RX ORDER — SODIUM CHLORIDE 9 MG/ML
250 INJECTION, SOLUTION INTRAVENOUS ONCE
Status: COMPLETED | OUTPATIENT
Start: 2020-10-14 | End: 2020-10-14

## 2020-10-14 RX ORDER — LORATADINE 10 MG/1
10 TABLET ORAL DAILY
Status: DISCONTINUED | OUTPATIENT
Start: 2020-10-14 | End: 2020-10-14

## 2020-10-14 RX ORDER — ONDANSETRON 2 MG/ML
4 INJECTION INTRAMUSCULAR; INTRAVENOUS AS NEEDED
Status: CANCELLED
Start: 2020-11-11

## 2020-10-14 RX ADMIN — NATALIZUMAB 300 MG: 300 INJECTION INTRAVENOUS at 13:47

## 2020-10-14 RX ADMIN — SODIUM CHLORIDE 250 ML: 9 INJECTION, SOLUTION INTRAVENOUS at 13:47

## 2020-10-19 ENCOUNTER — HOSPITAL ENCOUNTER (OUTPATIENT)
Dept: PHYSICAL THERAPY | Facility: HOSPITAL | Age: 45
Setting detail: THERAPIES SERIES
Discharge: HOME | End: 2020-10-19

## 2020-10-19 DIAGNOSIS — M72.2 PLANTAR FASCIITIS: Primary | ICD-10-CM

## 2020-10-19 PROCEDURE — 97161 PT EVAL LOW COMPLEX 20 MIN: CPT | Performed by: PHYSICAL THERAPIST

## 2020-10-23 DIAGNOSIS — Z12.31 ENCOUNTER FOR SCREENING MAMMOGRAM FOR MALIGNANT NEOPLASM OF BREAST: ICD-10-CM

## 2020-10-23 LAB
CONV INDEX VALUE: 0.36
INTERPRETATION: NORMAL
INTERPRETATION: NORMAL
JCV ANTIBODY BY INHIBITION: NEGATIVE
JCV ANTIBODY: NORMAL

## 2020-11-11 ENCOUNTER — INFUSION (OUTPATIENT)
Dept: ONCOLOGY | Facility: HOSPITAL | Age: 45
End: 2020-11-11

## 2020-11-11 VITALS
RESPIRATION RATE: 16 BRPM | SYSTOLIC BLOOD PRESSURE: 135 MMHG | TEMPERATURE: 96.5 F | HEART RATE: 75 BPM | DIASTOLIC BLOOD PRESSURE: 79 MMHG

## 2020-11-11 DIAGNOSIS — G35 MULTIPLE SCLEROSIS (HCC): Primary | ICD-10-CM

## 2020-11-11 PROCEDURE — 96365 THER/PROPH/DIAG IV INF INIT: CPT | Performed by: INTERNAL MEDICINE

## 2020-11-11 PROCEDURE — 25010000002 NATALIZUMAB PER 1 MG: Performed by: NURSE PRACTITIONER

## 2020-11-11 RX ORDER — LORATADINE 10 MG/1
10 TABLET ORAL DAILY
Status: DISCONTINUED | OUTPATIENT
Start: 2020-11-11 | End: 2020-11-11 | Stop reason: HOSPADM

## 2020-11-11 RX ORDER — ONDANSETRON 2 MG/ML
4 INJECTION INTRAMUSCULAR; INTRAVENOUS AS NEEDED
Status: CANCELLED
Start: 2020-12-09

## 2020-11-11 RX ORDER — DIPHENHYDRAMINE HYDROCHLORIDE 50 MG/ML
25 INJECTION INTRAMUSCULAR; INTRAVENOUS AS NEEDED
Status: CANCELLED
Start: 2020-12-09

## 2020-11-11 RX ORDER — ONDANSETRON 4 MG/1
8 TABLET, ORALLY DISINTEGRATING ORAL ONCE
Status: CANCELLED
Start: 2020-12-09

## 2020-11-11 RX ORDER — ACETAMINOPHEN 325 MG/1
650 TABLET ORAL ONCE
Status: CANCELLED | OUTPATIENT
Start: 2020-12-09

## 2020-11-11 RX ORDER — LORATADINE 10 MG/1
10 TABLET ORAL DAILY
Status: CANCELLED
Start: 2020-12-09

## 2020-11-11 RX ORDER — ACETAMINOPHEN 325 MG/1
650 TABLET ORAL ONCE
Status: COMPLETED | OUTPATIENT
Start: 2020-11-11 | End: 2020-11-11

## 2020-11-11 RX ORDER — SODIUM CHLORIDE 9 MG/ML
250 INJECTION, SOLUTION INTRAVENOUS ONCE
Status: COMPLETED | OUTPATIENT
Start: 2020-11-11 | End: 2020-11-11

## 2020-11-11 RX ORDER — SODIUM CHLORIDE 9 MG/ML
250 INJECTION, SOLUTION INTRAVENOUS ONCE
Status: CANCELLED | OUTPATIENT
Start: 2020-12-09

## 2020-11-11 RX ORDER — ONDANSETRON 4 MG/1
8 TABLET, ORALLY DISINTEGRATING ORAL ONCE
Status: DISCONTINUED | OUTPATIENT
Start: 2020-11-11 | End: 2020-11-11 | Stop reason: HOSPADM

## 2020-11-11 RX ADMIN — ACETAMINOPHEN 650 MG: 325 TABLET, FILM COATED ORAL at 13:56

## 2020-11-11 RX ADMIN — NATALIZUMAB 300 MG: 300 INJECTION INTRAVENOUS at 14:08

## 2020-11-11 RX ADMIN — SODIUM CHLORIDE 250 ML: 9 INJECTION, SOLUTION INTRAVENOUS at 13:56

## 2020-11-13 ENCOUNTER — TELEPHONE (OUTPATIENT)
Dept: FAMILY MEDICINE CLINIC | Facility: CLINIC | Age: 45
End: 2020-11-13

## 2020-11-16 DIAGNOSIS — M25.561 CHRONIC PAIN OF RIGHT KNEE: ICD-10-CM

## 2020-11-16 DIAGNOSIS — G35 MULTIPLE SCLEROSIS (HCC): ICD-10-CM

## 2020-11-16 DIAGNOSIS — G89.29 CHRONIC PAIN OF RIGHT KNEE: ICD-10-CM

## 2020-11-16 RX ORDER — HYDROCODONE BITARTRATE AND ACETAMINOPHEN 7.5; 325 MG/1; MG/1
1 TABLET ORAL EVERY 8 HOURS PRN
Qty: 90 TABLET | Refills: 0 | Status: SHIPPED | OUTPATIENT
Start: 2020-11-16 | End: 2020-12-14 | Stop reason: SDUPTHER

## 2020-11-20 ENCOUNTER — OFFICE VISIT (OUTPATIENT)
Dept: FAMILY MEDICINE CLINIC | Facility: CLINIC | Age: 45
End: 2020-11-20

## 2020-11-20 VITALS
OXYGEN SATURATION: 98 % | BODY MASS INDEX: 39.7 KG/M2 | HEIGHT: 66 IN | HEART RATE: 73 BPM | RESPIRATION RATE: 19 BRPM | WEIGHT: 247 LBS | DIASTOLIC BLOOD PRESSURE: 80 MMHG | TEMPERATURE: 97.1 F | SYSTOLIC BLOOD PRESSURE: 123 MMHG

## 2020-11-20 DIAGNOSIS — G89.29 OTHER CHRONIC PAIN: ICD-10-CM

## 2020-11-20 DIAGNOSIS — I10 ESSENTIAL HYPERTENSION: Primary | ICD-10-CM

## 2020-11-20 DIAGNOSIS — G35 MULTIPLE SCLEROSIS (HCC): ICD-10-CM

## 2020-11-20 PROCEDURE — 99214 OFFICE O/P EST MOD 30 MIN: CPT | Performed by: NURSE PRACTITIONER

## 2020-11-20 RX ORDER — PREGABALIN 150 MG/1
150 CAPSULE ORAL 3 TIMES DAILY
COMMUNITY
Start: 2020-10-27 | End: 2020-11-27

## 2020-11-20 NOTE — PATIENT INSTRUCTIONS
Calorie Counting for Weight Loss  Calories are units of energy. Your body needs a certain amount of calories from food to keep you going throughout the day. When you eat more calories than your body needs, your body stores the extra calories as fat. When you eat fewer calories than your body needs, your body burns fat to get the energy it needs.  Calorie counting means keeping track of how many calories you eat and drink each day. Calorie counting can be helpful if you need to lose weight. If you make sure to eat fewer calories than your body needs, you should lose weight. Ask your health care provider what a healthy weight is for you.  For calorie counting to work, you will need to eat the right number of calories in a day in order to lose a healthy amount of weight per week. A dietitian can help you determine how many calories you need in a day and will give you suggestions on how to reach your calorie goal.  · A healthy amount of weight to lose per week is usually 1-2 lb (0.5-0.9 kg). This usually means that your daily calorie intake should be reduced by 500-750 calories.  · Eating 1,200 - 1,500 calories per day can help most women lose weight.  · Eating 1,500 - 1,800 calories per day can help most men lose weight.  What is my plan?  My goal is to have __________ calories per day.  If I have this many calories per day, I should lose around __________ pounds per week.  What do I need to know about calorie counting?  In order to meet your daily calorie goal, you will need to:  · Find out how many calories are in each food you would like to eat. Try to do this before you eat.  · Decide how much of the food you plan to eat.  · Write down what you ate and how many calories it had. Doing this is called keeping a food log.  To successfully lose weight, it is important to balance calorie counting with a healthy lifestyle that includes regular activity. Aim for 150 minutes of moderate exercise (such as walking) or 75  minutes of vigorous exercise (such as running) each week.  Where do I find calorie information?    The number of calories in a food can be found on a Nutrition Facts label. If a food does not have a Nutrition Facts label, try to look up the calories online or ask your dietitian for help.  Remember that calories are listed per serving. If you choose to have more than one serving of a food, you will have to multiply the calories per serving by the amount of servings you plan to eat. For example, the label on a package of bread might say that a serving size is 1 slice and that there are 90 calories in a serving. If you eat 1 slice, you will have eaten 90 calories. If you eat 2 slices, you will have eaten 180 calories.  How do I keep a food log?  Immediately after each meal, record the following information in your food log:  · What you ate. Don't forget to include toppings, sauces, and other extras on the food.  · How much you ate. This can be measured in cups, ounces, or number of items.  · How many calories each food and drink had.  · The total number of calories in the meal.  Keep your food log near you, such as in a small notebook in your pocket, or use a mobile robert or website. Some programs will calculate calories for you and show you how many calories you have left for the day to meet your goal.  What are some calorie counting tips?    · Use your calories on foods and drinks that will fill you up and not leave you hungry:  ? Some examples of foods that fill you up are nuts and nut butters, vegetables, lean proteins, and high-fiber foods like whole grains. High-fiber foods are foods with more than 5 g fiber per serving.  ? Drinks such as sodas, specialty coffee drinks, alcohol, and juices have a lot of calories, yet do not fill you up.  · Eat nutritious foods and avoid empty calories. Empty calories are calories you get from foods or beverages that do not have many vitamins or protein, such as candy, sweets, and  "soda. It is better to have a nutritious high-calorie food (such as an avocado) than a food with few nutrients (such as a bag of chips).  · Know how many calories are in the foods you eat most often. This will help you calculate calorie counts faster.  · Pay attention to calories in drinks. Low-calorie drinks include water and unsweetened drinks.  · Pay attention to nutrition labels for \"low fat\" or \"fat free\" foods. These foods sometimes have the same amount of calories or more calories than the full fat versions. They also often have added sugar, starch, or salt, to make up for flavor that was removed with the fat.  · Find a way of tracking calories that works for you. Get creative. Try different apps or programs if writing down calories does not work for you.  What are some portion control tips?  · Know how many calories are in a serving. This will help you know how many servings of a certain food you can have.  · Use a measuring cup to measure serving sizes. You could also try weighing out portions on a kitchen scale. With time, you will be able to estimate serving sizes for some foods.  · Take some time to put servings of different foods on your favorite plates, bowls, and cups so you know what a serving looks like.  · Try not to eat straight from a bag or box. Doing this can lead to overeating. Put the amount you would like to eat in a cup or on a plate to make sure you are eating the right portion.  · Use smaller plates, glasses, and bowls to prevent overeating.  · Try not to multitask (for example, watch TV or use your computer) while eating. If it is time to eat, sit down at a table and enjoy your food. This will help you to know when you are full. It will also help you to be aware of what you are eating and how much you are eating.  What are tips for following this plan?  Reading food labels  · Check the calorie count compared to the serving size. The serving size may be smaller than what you are used to " "eating.  · Check the source of the calories. Make sure the food you are eating is high in vitamins and protein and low in saturated and trans fats.  Shopping  · Read nutrition labels while you shop. This will help you make healthy decisions before you decide to purchase your food.  · Make a grocery list and stick to it.  Cooking  · Try to cook your favorite foods in a healthier way. For example, try baking instead of frying.  · Use low-fat dairy products.  Meal planning  · Use more fruits and vegetables. Half of your plate should be fruits and vegetables.  · Include lean proteins like poultry and fish.  How do I count calories when eating out?  · Ask for smaller portion sizes.  · Consider sharing an entree and sides instead of getting your own entree.  · If you get your own entree, eat only half. Ask for a box at the beginning of your meal and put the rest of your entree in it so you are not tempted to eat it.  · If calories are listed on the menu, choose the lower calorie options.  · Choose dishes that include vegetables, fruits, whole grains, low-fat dairy products, and lean protein.  · Choose items that are boiled, broiled, grilled, or steamed. Stay away from items that are buttered, battered, fried, or served with cream sauce. Items labeled \"crispy\" are usually fried, unless stated otherwise.  · Choose water, low-fat milk, unsweetened iced tea, or other drinks without added sugar. If you want an alcoholic beverage, choose a lower calorie option such as a glass of wine or light beer.  · Ask for dressings, sauces, and syrups on the side. These are usually high in calories, so you should limit the amount you eat.  · If you want a salad, choose a garden salad and ask for grilled meats. Avoid extra toppings like salazar, cheese, or fried items. Ask for the dressing on the side, or ask for olive oil and vinegar or lemon to use as dressing.  · Estimate how many servings of a food you are given. For example, a serving of " cooked rice is ½ cup or about the size of half a baseball. Knowing serving sizes will help you be aware of how much food you are eating at restaurants. The list below tells you how big or small some common portion sizes are based on everyday objects:  ? 1 oz--4 stacked dice.  ? 3 oz--1 deck of cards.  ? 1 tsp--1 die.  ? 1 Tbsp--½ a ping-pong ball.  ? 2 Tbsp--1 ping-pong ball.  ? ½ cup--½ baseball.  ? 1 cup--1 baseball.  Summary  · Calorie counting means keeping track of how many calories you eat and drink each day. If you eat fewer calories than your body needs, you should lose weight.  · A healthy amount of weight to lose per week is usually 1-2 lb (0.5-0.9 kg). This usually means reducing your daily calorie intake by 500-750 calories.  · The number of calories in a food can be found on a Nutrition Facts label. If a food does not have a Nutrition Facts label, try to look up the calories online or ask your dietitian for help.  · Use your calories on foods and drinks that will fill you up, and not on foods and drinks that will leave you hungry.  · Use smaller plates, glasses, and bowls to prevent overeating.  This information is not intended to replace advice given to you by your health care provider. Make sure you discuss any questions you have with your health care provider.  Document Released: 12/18/2006 Document Revised: 09/06/2019 Document Reviewed: 11/17/2017  [employer] Patient Education © 2020 [employer]      Exercising to Lose Weight  Exercise is structured, repetitive physical activity to improve fitness and health. Getting regular exercise is important for everyone. It is especially important if you are overweight. Being overweight increases your risk of heart disease, stroke, diabetes, high blood pressure, and several types of cancer. Reducing your calorie intake and exercising can help you lose weight.  Exercise is usually categorized as moderate or vigorous intensity. To lose weight, most people need  to do a certain amount of moderate-intensity or vigorous-intensity exercise each week.  Moderate-intensity exercise    Moderate-intensity exercise is any activity that gets you moving enough to burn at least three times more energy (calories) than if you were sitting.  Examples of moderate exercise include:  · Walking a mile in 15 minutes.  · Doing light yard work.  · Biking at an easy pace.  Most people should get at least 150 minutes (2 hours and 30 minutes) a week of moderate-intensity exercise to maintain their body weight.  Vigorous-intensity exercise  Vigorous-intensity exercise is any activity that gets you moving enough to burn at least six times more calories than if you were sitting. When you exercise at this intensity, you should be working hard enough that you are not able to carry on a conversation.  Examples of vigorous exercise include:  · Running.  · Playing a team sport, such as football, basketball, and soccer.  · Jumping rope.  Most people should get at least 75 minutes (1 hour and 15 minutes) a week of vigorous-intensity exercise to maintain their body weight.  How can exercise affect me?  When you exercise enough to burn more calories than you eat, you lose weight. Exercise also reduces body fat and builds muscle. The more muscle you have, the more calories you burn. Exercise also:  · Improves mood.  · Reduces stress and tension.  · Improves your overall fitness, flexibility, and endurance.  · Increases bone strength.  The amount of exercise you need to lose weight depends on:  · Your age.  · The type of exercise.  · Any health conditions you have.  · Your overall physical ability.  Talk to your health care provider about how much exercise you need and what types of activities are safe for you.  What actions can I take to lose weight?  Nutrition    · Make changes to your diet as told by your health care provider or diet and nutrition specialist (dietitian). This may include:  ? Eating fewer  calories.  ? Eating more protein.  ? Eating less unhealthy fats.  ? Eating a diet that includes fresh fruits and vegetables, whole grains, low-fat dairy products, and lean protein.  ? Avoiding foods with added fat, salt, and sugar.  · Drink plenty of water while you exercise to prevent dehydration or heat stroke.  Activity  · Choose an activity that you enjoy and set realistic goals. Your health care provider can help you make an exercise plan that works for you.  · Exercise at a moderate or vigorous intensity most days of the week.  ? The intensity of exercise may vary from person to person. You can tell how intense a workout is for you by paying attention to your breathing and heartbeat. Most people will notice their breathing and heartbeat get faster with more intense exercise.  · Do resistance training twice each week, such as:  ? Push-ups.  ? Sit-ups.  ? Lifting weights.  ? Using resistance bands.  · Getting short amounts of exercise can be just as helpful as long structured periods of exercise. If you have trouble finding time to exercise, try to include exercise in your daily routine.  ? Get up, stretch, and walk around every 30 minutes throughout the day.  ? Go for a walk during your lunch break.  ? Park your car farther away from your destination.  ? If you take public transportation, get off one stop early and walk the rest of the way.  ? Make phone calls while standing up and walking around.  ? Take the stairs instead of elevators or escalators.  · Wear comfortable clothes and shoes with good support.  · Do not exercise so much that you hurt yourself, feel dizzy, or get very short of breath.  Where to find more information  · [place] Department of Health and Human Services: www.hhs.gov  · Centers for Disease Control and Prevention (CDC): www.cdc.gov  Contact a health care provider:  · Before starting a new exercise program.  · If you have questions or concerns about your weight.  · If you have a medical  problem that keeps you from exercising.  Get help right away if you have any of the following while exercising:  · Injury.  · Dizziness.  · Difficulty breathing or shortness of breath that does not go away when you stop exercising.  · Chest pain.  · Rapid heartbeat.  Summary  · Being overweight increases your risk of heart disease, stroke, diabetes, high blood pressure, and several types of cancer.  · Losing weight happens when you burn more calories than you eat.  · Reducing the amount of calories you eat in addition to getting regular moderate or vigorous exercise each week helps you lose weight.  This information is not intended to replace advice given to you by your health care provider. Make sure you discuss any questions you have with your health care provider.  Document Released: 01/20/2012 Document Revised: 12/31/2018 Document Reviewed: 12/31/2018  [employer] Patient Education © 2020 [employer]

## 2020-11-20 NOTE — PROGRESS NOTES
Subjective   Jenn Good is a 45 y.o. female.     Jenn Good age 45 comes in today for recheck check and chief complaint is pain from her MS.  Since she is on the present pain medication she states that she is greatly improved, that she is able to get out and exercise now in the form of walking.  Her pain is controlled and she feels like she is in better control of her life now.  Blood pressure is adequately controlled with the present medication.    Hypertension  This is a chronic problem. The current episode started more than 1 year ago. The problem is unchanged. The problem is controlled. Pertinent negatives include no anxiety, blurred vision, chest pain, headaches, malaise/fatigue, neck pain, orthopnea, palpitations, peripheral edema, PND, shortness of breath or sweats. Agents associated with hypertension include estrogens. Risk factors for coronary artery disease include obesity, sedentary lifestyle and stress. Past treatments include calcium channel blockers. Current antihypertension treatment includes calcium channel blockers. The current treatment provides significant improvement. There are no compliance problems.  There is no history of angina, kidney disease, CAD/MI, CVA, heart failure, left ventricular hypertrophy, PVD or retinopathy.   Pain  This is a chronic problem. The current episode started more than 1 year ago. The problem occurs constantly. The problem has been gradually improving. Associated symptoms include arthralgias. Pertinent negatives include no abdominal pain, anorexia, change in bowel habit, chest pain, chills, congestion, coughing, diaphoresis, fatigue, fever, headaches, joint swelling, myalgias, nausea, neck pain, numbness, rash, sore throat, swollen glands, urinary symptoms, vertigo, visual change, vomiting or weakness. The symptoms are aggravated by exertion. Treatments tried: lyrica, norco. The treatment provided significant relief.        The following portions of the  patient's history were reviewed and updated as appropriate: allergies, current medications, past family history, past medical history, past social history, past surgical history and problem list.    Review of Systems   Constitutional: Negative.  Negative for chills, diaphoresis, fatigue, fever and malaise/fatigue.   HENT: Negative.  Negative for congestion, sore throat and swollen glands.    Eyes: Negative.  Negative for blurred vision.   Respiratory: Negative.  Negative for cough and shortness of breath.    Cardiovascular: Negative.  Negative for chest pain, palpitations, orthopnea and PND.   Gastrointestinal: Negative.  Negative for abdominal pain, anorexia, change in bowel habit, nausea and vomiting.   Endocrine: Negative.    Genitourinary: Negative.    Musculoskeletal: Positive for arthralgias. Negative for joint swelling, myalgias and neck pain.   Skin: Negative.  Negative for rash.   Allergic/Immunologic: Negative.    Neurological: Negative.  Negative for vertigo, weakness and numbness.   Hematological: Negative.    Psychiatric/Behavioral: Negative.        Objective   Physical Exam  Vitals signs and nursing note reviewed.   Constitutional:       General: She is not in acute distress.     Appearance: She is well-developed.   HENT:      Head: Normocephalic and atraumatic.      Right Ear: External ear normal.      Left Ear: External ear normal.      Nose: Nose normal.      Mouth/Throat:      Pharynx: No oropharyngeal exudate.   Eyes:      Pupils: Pupils are equal, round, and reactive to light.   Neck:      Musculoskeletal: Normal range of motion and neck supple.      Thyroid: No thyromegaly.   Cardiovascular:      Rate and Rhythm: Normal rate and regular rhythm.      Heart sounds: Normal heart sounds. No murmur. No friction rub.   Pulmonary:      Effort: Pulmonary effort is normal. No respiratory distress.      Breath sounds: Normal breath sounds. No wheezing or rales.   Abdominal:      Palpations: Abdomen is  soft.   Musculoskeletal: Normal range of motion.   Skin:     General: Skin is warm and dry.   Neurological:      Mental Status: She is alert and oriented to person, place, and time.   Psychiatric:         Thought Content: Thought content normal.           Assessment/Plan   Diagnoses and all orders for this visit:    1. Essential hypertension (Primary)    2. Multiple sclerosis (CMS/HCC)    3. Other chronic pain      Continue with present medication with no changes.  BMI is noted to be 39.9 which is considered obese.  Diet and exercise information have been provided to this patient.

## 2020-12-09 ENCOUNTER — INFUSION (OUTPATIENT)
Dept: ONCOLOGY | Facility: HOSPITAL | Age: 45
End: 2020-12-09

## 2020-12-09 VITALS
RESPIRATION RATE: 18 BRPM | SYSTOLIC BLOOD PRESSURE: 141 MMHG | DIASTOLIC BLOOD PRESSURE: 88 MMHG | HEART RATE: 73 BPM | TEMPERATURE: 98 F

## 2020-12-09 DIAGNOSIS — G35 MULTIPLE SCLEROSIS (HCC): Primary | ICD-10-CM

## 2020-12-09 PROCEDURE — 96365 THER/PROPH/DIAG IV INF INIT: CPT | Performed by: NURSE PRACTITIONER

## 2020-12-09 PROCEDURE — 25010000002 NATALIZUMAB PER 1 MG: Performed by: NURSE PRACTITIONER

## 2020-12-09 RX ORDER — LORATADINE 10 MG/1
10 TABLET ORAL DAILY
Status: DISCONTINUED | OUTPATIENT
Start: 2020-12-09 | End: 2020-12-09 | Stop reason: HOSPADM

## 2020-12-09 RX ORDER — DIPHENHYDRAMINE HYDROCHLORIDE 50 MG/ML
25 INJECTION INTRAMUSCULAR; INTRAVENOUS AS NEEDED
Status: CANCELLED
Start: 2021-01-01

## 2020-12-09 RX ORDER — LORATADINE 10 MG/1
10 TABLET ORAL DAILY
Status: CANCELLED
Start: 2021-01-01

## 2020-12-09 RX ORDER — SODIUM CHLORIDE 9 MG/ML
250 INJECTION, SOLUTION INTRAVENOUS ONCE
Status: COMPLETED | OUTPATIENT
Start: 2020-12-09 | End: 2020-12-09

## 2020-12-09 RX ORDER — ONDANSETRON 4 MG/1
8 TABLET, ORALLY DISINTEGRATING ORAL ONCE
Status: DISCONTINUED | OUTPATIENT
Start: 2020-12-09 | End: 2020-12-09 | Stop reason: HOSPADM

## 2020-12-09 RX ORDER — ACETAMINOPHEN 325 MG/1
650 TABLET ORAL ONCE
Status: CANCELLED | OUTPATIENT
Start: 2021-01-01

## 2020-12-09 RX ORDER — ONDANSETRON 2 MG/ML
4 INJECTION INTRAMUSCULAR; INTRAVENOUS AS NEEDED
Status: CANCELLED
Start: 2021-01-01

## 2020-12-09 RX ORDER — SODIUM CHLORIDE 9 MG/ML
250 INJECTION, SOLUTION INTRAVENOUS ONCE
Status: CANCELLED | OUTPATIENT
Start: 2021-01-01

## 2020-12-09 RX ORDER — ACETAMINOPHEN 325 MG/1
650 TABLET ORAL ONCE
Status: COMPLETED | OUTPATIENT
Start: 2020-12-09 | End: 2020-12-09

## 2020-12-09 RX ORDER — ONDANSETRON 4 MG/1
8 TABLET, ORALLY DISINTEGRATING ORAL ONCE
Status: CANCELLED
Start: 2021-01-01

## 2020-12-09 RX ADMIN — SODIUM CHLORIDE 250 ML: 9 INJECTION, SOLUTION INTRAVENOUS at 13:37

## 2020-12-09 RX ADMIN — NATALIZUMAB 300 MG: 300 INJECTION INTRAVENOUS at 14:08

## 2020-12-09 RX ADMIN — ACETAMINOPHEN 650 MG: 325 TABLET, FILM COATED ORAL at 13:37

## 2020-12-14 DIAGNOSIS — M25.561 CHRONIC PAIN OF RIGHT KNEE: ICD-10-CM

## 2020-12-14 DIAGNOSIS — G35 MULTIPLE SCLEROSIS (HCC): ICD-10-CM

## 2020-12-14 DIAGNOSIS — G89.29 CHRONIC PAIN OF RIGHT KNEE: ICD-10-CM

## 2020-12-14 RX ORDER — HYDROCODONE BITARTRATE AND ACETAMINOPHEN 7.5; 325 MG/1; MG/1
1 TABLET ORAL EVERY 8 HOURS PRN
Qty: 90 TABLET | Refills: 0 | Status: SHIPPED | OUTPATIENT
Start: 2020-12-14 | End: 2021-01-14 | Stop reason: SDUPTHER

## 2020-12-14 NOTE — TELEPHONE ENCOUNTER
Caller: Jenn Good    Relationship: Self    Best call back number: [phone]    Medication needed:   Requested Prescriptions     Pending Prescriptions Disp Refills   • HYDROcodone-acetaminophen (NORCO) 7.5-325 MG per tablet 90 tablet 0     Sig: Take 1 tablet by mouth Every 8 (Eight) Hours As Needed for Moderate Pain .       When do you need the refill by: 12/16/2020    Does the patient have less than a 3 day supply:  [] Yes  [x] No    What is the patient's preferred pharmacy: [facility] [address] [place] [address] [phone] [facility] [phone]

## 2021-01-06 ENCOUNTER — INFUSION (OUTPATIENT)
Dept: ONCOLOGY | Facility: HOSPITAL | Age: 46
End: 2021-01-06

## 2021-01-06 VITALS
DIASTOLIC BLOOD PRESSURE: 86 MMHG | RESPIRATION RATE: 18 BRPM | TEMPERATURE: 98.2 F | SYSTOLIC BLOOD PRESSURE: 128 MMHG | HEART RATE: 91 BPM

## 2021-01-06 DIAGNOSIS — G35 MULTIPLE SCLEROSIS (HCC): Primary | ICD-10-CM

## 2021-01-06 PROCEDURE — 25010000002 NATALIZUMAB PER 1 MG: Performed by: NURSE PRACTITIONER

## 2021-01-06 PROCEDURE — 96365 THER/PROPH/DIAG IV INF INIT: CPT | Performed by: NURSE PRACTITIONER

## 2021-01-06 RX ORDER — SODIUM CHLORIDE 9 MG/ML
250 INJECTION, SOLUTION INTRAVENOUS ONCE
Status: COMPLETED | OUTPATIENT
Start: 2021-01-06 | End: 2021-01-06

## 2021-01-06 RX ORDER — LORATADINE 10 MG/1
10 TABLET ORAL DAILY
Status: CANCELLED
Start: 2021-02-03

## 2021-01-06 RX ORDER — ONDANSETRON 4 MG/1
8 TABLET, ORALLY DISINTEGRATING ORAL ONCE
Status: DISCONTINUED | OUTPATIENT
Start: 2021-01-06 | End: 2021-01-06 | Stop reason: HOSPADM

## 2021-01-06 RX ORDER — SODIUM CHLORIDE 9 MG/ML
250 INJECTION, SOLUTION INTRAVENOUS ONCE
Status: CANCELLED | OUTPATIENT
Start: 2021-02-03

## 2021-01-06 RX ORDER — ONDANSETRON 2 MG/ML
4 INJECTION INTRAMUSCULAR; INTRAVENOUS AS NEEDED
Status: CANCELLED
Start: 2021-02-03

## 2021-01-06 RX ORDER — LORATADINE 10 MG/1
10 TABLET ORAL DAILY
Status: DISCONTINUED | OUTPATIENT
Start: 2021-01-06 | End: 2021-01-06 | Stop reason: HOSPADM

## 2021-01-06 RX ORDER — ACETAMINOPHEN 325 MG/1
650 TABLET ORAL ONCE
Status: DISCONTINUED | OUTPATIENT
Start: 2021-01-06 | End: 2021-01-06 | Stop reason: HOSPADM

## 2021-01-06 RX ORDER — DIPHENHYDRAMINE HYDROCHLORIDE 50 MG/ML
25 INJECTION INTRAMUSCULAR; INTRAVENOUS AS NEEDED
Status: CANCELLED
Start: 2021-02-03

## 2021-01-06 RX ORDER — ONDANSETRON 4 MG/1
8 TABLET, ORALLY DISINTEGRATING ORAL ONCE
Status: CANCELLED
Start: 2021-02-03

## 2021-01-06 RX ORDER — ACETAMINOPHEN 325 MG/1
650 TABLET ORAL ONCE
Status: CANCELLED | OUTPATIENT
Start: 2021-02-03

## 2021-01-06 RX ADMIN — NATALIZUMAB 300 MG: 300 INJECTION INTRAVENOUS at 13:42

## 2021-01-06 RX ADMIN — SODIUM CHLORIDE 250 ML: 9 INJECTION, SOLUTION INTRAVENOUS at 13:42

## 2021-01-14 ENCOUNTER — TELEPHONE (OUTPATIENT)
Dept: FAMILY MEDICINE CLINIC | Facility: CLINIC | Age: 46
End: 2021-01-14

## 2021-01-14 DIAGNOSIS — G89.29 CHRONIC PAIN OF RIGHT KNEE: ICD-10-CM

## 2021-01-14 DIAGNOSIS — G35 MULTIPLE SCLEROSIS (HCC): ICD-10-CM

## 2021-01-14 DIAGNOSIS — M25.561 CHRONIC PAIN OF RIGHT KNEE: ICD-10-CM

## 2021-01-14 RX ORDER — HYDROCODONE BITARTRATE AND ACETAMINOPHEN 7.5; 325 MG/1; MG/1
1 TABLET ORAL EVERY 8 HOURS PRN
Qty: 90 TABLET | Refills: 0 | Status: SHIPPED | OUTPATIENT
Start: 2021-01-14 | End: 2021-02-15 | Stop reason: SDUPTHER

## 2021-01-14 NOTE — TELEPHONE ENCOUNTER
Needs refill on    HYDROcodone-acetaminophen (NORCO) 7.5-325 MG per tablet        [facility] [address] [place] [address] [phone] [facility] [phone] FX

## 2021-02-03 ENCOUNTER — INFUSION (OUTPATIENT)
Dept: ONCOLOGY | Facility: HOSPITAL | Age: 46
End: 2021-02-03

## 2021-02-03 VITALS
RESPIRATION RATE: 16 BRPM | HEART RATE: 81 BPM | TEMPERATURE: 96.7 F | DIASTOLIC BLOOD PRESSURE: 70 MMHG | SYSTOLIC BLOOD PRESSURE: 121 MMHG

## 2021-02-03 DIAGNOSIS — G35 MULTIPLE SCLEROSIS (HCC): Primary | ICD-10-CM

## 2021-02-03 PROCEDURE — 25010000002 NATALIZUMAB PER 1 MG: Performed by: NURSE PRACTITIONER

## 2021-02-03 PROCEDURE — 96365 THER/PROPH/DIAG IV INF INIT: CPT | Performed by: NURSE PRACTITIONER

## 2021-02-03 RX ORDER — ACETAMINOPHEN 325 MG/1
650 TABLET ORAL ONCE
Status: DISCONTINUED | OUTPATIENT
Start: 2021-02-03 | End: 2021-02-03 | Stop reason: HOSPADM

## 2021-02-03 RX ORDER — LORATADINE 10 MG/1
10 TABLET ORAL DAILY
Status: DISCONTINUED | OUTPATIENT
Start: 2021-02-03 | End: 2021-02-03 | Stop reason: HOSPADM

## 2021-02-03 RX ORDER — ONDANSETRON 4 MG/1
8 TABLET, ORALLY DISINTEGRATING ORAL ONCE
Status: DISCONTINUED | OUTPATIENT
Start: 2021-02-03 | End: 2021-02-03 | Stop reason: HOSPADM

## 2021-02-03 RX ORDER — ACETAMINOPHEN 325 MG/1
650 TABLET ORAL ONCE
Status: CANCELLED | OUTPATIENT
Start: 2021-03-03

## 2021-02-03 RX ORDER — ONDANSETRON 2 MG/ML
4 INJECTION INTRAMUSCULAR; INTRAVENOUS AS NEEDED
Status: CANCELLED
Start: 2021-03-03

## 2021-02-03 RX ORDER — DIPHENHYDRAMINE HYDROCHLORIDE 50 MG/ML
25 INJECTION INTRAMUSCULAR; INTRAVENOUS AS NEEDED
Status: CANCELLED
Start: 2021-03-03

## 2021-02-03 RX ORDER — ONDANSETRON 4 MG/1
8 TABLET, ORALLY DISINTEGRATING ORAL ONCE
Status: CANCELLED
Start: 2021-03-03

## 2021-02-03 RX ORDER — SODIUM CHLORIDE 9 MG/ML
250 INJECTION, SOLUTION INTRAVENOUS ONCE
Status: CANCELLED | OUTPATIENT
Start: 2021-03-03

## 2021-02-03 RX ORDER — SODIUM CHLORIDE 9 MG/ML
250 INJECTION, SOLUTION INTRAVENOUS ONCE
Status: COMPLETED | OUTPATIENT
Start: 2021-02-03 | End: 2021-02-03

## 2021-02-03 RX ORDER — LORATADINE 10 MG/1
10 TABLET ORAL DAILY
Status: CANCELLED
Start: 2021-03-03

## 2021-02-03 RX ADMIN — SODIUM CHLORIDE 250 ML: 9 INJECTION, SOLUTION INTRAVENOUS at 13:38

## 2021-02-03 RX ADMIN — NATALIZUMAB 300 MG: 300 INJECTION INTRAVENOUS at 13:38

## 2021-02-15 DIAGNOSIS — M25.561 CHRONIC PAIN OF RIGHT KNEE: ICD-10-CM

## 2021-02-15 DIAGNOSIS — G35 MULTIPLE SCLEROSIS (HCC): ICD-10-CM

## 2021-02-15 DIAGNOSIS — G89.29 CHRONIC PAIN OF RIGHT KNEE: ICD-10-CM

## 2021-02-15 NOTE — TELEPHONE ENCOUNTER
Caller: Jenn Good    Relationship: Self    Best call back number:   [phone]   Medication needed:   Requested Prescriptions     Pending Prescriptions Disp Refills   • HYDROcodone-acetaminophen (NORCO) 7.5-325 MG per tablet 90 tablet 0     Sig: Take 1 tablet by mouth Every 8 (Eight) Hours As Needed for Moderate Pain .       When do you need the refill by: ASAP        Does the patient have less than a 3 day supply:  [x] Yes  [] No    What is the patient's preferred pharmacy: [facility] [address] [place] [address] [phone] [facility] [phone] FX

## 2021-02-22 RX ORDER — HYDROCODONE BITARTRATE AND ACETAMINOPHEN 7.5; 325 MG/1; MG/1
1 TABLET ORAL EVERY 8 HOURS PRN
Qty: 90 TABLET | Refills: 0 | Status: SHIPPED | OUTPATIENT
Start: 2021-02-22 | End: 2021-03-19 | Stop reason: SDUPTHER

## 2021-02-24 DIAGNOSIS — G35 MULTIPLE SCLEROSIS (HCC): Primary | ICD-10-CM

## 2021-03-03 ENCOUNTER — INFUSION (OUTPATIENT)
Dept: ONCOLOGY | Facility: HOSPITAL | Age: 46
End: 2021-03-03

## 2021-03-03 VITALS
RESPIRATION RATE: 18 BRPM | TEMPERATURE: 96.8 F | SYSTOLIC BLOOD PRESSURE: 133 MMHG | HEART RATE: 75 BPM | DIASTOLIC BLOOD PRESSURE: 82 MMHG

## 2021-03-03 DIAGNOSIS — G35 MULTIPLE SCLEROSIS (HCC): Primary | ICD-10-CM

## 2021-03-03 PROCEDURE — 96365 THER/PROPH/DIAG IV INF INIT: CPT | Performed by: NURSE PRACTITIONER

## 2021-03-03 PROCEDURE — 25010000002 NATALIZUMAB PER 1 MG: Performed by: NURSE PRACTITIONER

## 2021-03-03 RX ORDER — ONDANSETRON 2 MG/ML
4 INJECTION INTRAMUSCULAR; INTRAVENOUS AS NEEDED
Status: CANCELLED
Start: 2021-03-31

## 2021-03-03 RX ORDER — DIPHENHYDRAMINE HYDROCHLORIDE 50 MG/ML
25 INJECTION INTRAMUSCULAR; INTRAVENOUS AS NEEDED
Status: CANCELLED
Start: 2021-03-31

## 2021-03-03 RX ORDER — ONDANSETRON 4 MG/1
8 TABLET, ORALLY DISINTEGRATING ORAL ONCE
Status: CANCELLED
Start: 2021-03-31

## 2021-03-03 RX ORDER — ACETAMINOPHEN 325 MG/1
650 TABLET ORAL ONCE
Status: CANCELLED | OUTPATIENT
Start: 2021-03-31

## 2021-03-03 RX ORDER — LORATADINE 10 MG/1
10 TABLET ORAL DAILY
Status: DISCONTINUED | OUTPATIENT
Start: 2021-03-03 | End: 2021-03-03 | Stop reason: HOSPADM

## 2021-03-03 RX ORDER — ACETAMINOPHEN 325 MG/1
650 TABLET ORAL ONCE
Status: DISCONTINUED | OUTPATIENT
Start: 2021-03-03 | End: 2021-03-03 | Stop reason: HOSPADM

## 2021-03-03 RX ORDER — ONDANSETRON 4 MG/1
8 TABLET, ORALLY DISINTEGRATING ORAL ONCE
Status: DISCONTINUED | OUTPATIENT
Start: 2021-03-03 | End: 2021-03-03 | Stop reason: HOSPADM

## 2021-03-03 RX ORDER — SODIUM CHLORIDE 9 MG/ML
250 INJECTION, SOLUTION INTRAVENOUS ONCE
Status: COMPLETED | OUTPATIENT
Start: 2021-03-03 | End: 2021-03-03

## 2021-03-03 RX ORDER — SODIUM CHLORIDE 9 MG/ML
250 INJECTION, SOLUTION INTRAVENOUS ONCE
Status: CANCELLED | OUTPATIENT
Start: 2021-03-31

## 2021-03-03 RX ORDER — LORATADINE 10 MG/1
10 TABLET ORAL DAILY
Status: CANCELLED
Start: 2021-03-31

## 2021-03-03 RX ADMIN — NATALIZUMAB 300 MG: 300 INJECTION INTRAVENOUS at 13:57

## 2021-03-03 RX ADMIN — SODIUM CHLORIDE 250 ML: 9 INJECTION, SOLUTION INTRAVENOUS at 13:57

## 2021-03-05 ENCOUNTER — OFFICE VISIT (OUTPATIENT)
Dept: FAMILY MEDICINE CLINIC | Facility: CLINIC | Age: 46
End: 2021-03-05

## 2021-03-05 ENCOUNTER — LAB (OUTPATIENT)
Dept: LAB | Facility: HOSPITAL | Age: 46
End: 2021-03-05

## 2021-03-05 ENCOUNTER — TELEPHONE (OUTPATIENT)
Dept: ONCOLOGY | Facility: CLINIC | Age: 46
End: 2021-03-05

## 2021-03-05 VITALS
WEIGHT: 241.13 LBS | OXYGEN SATURATION: 99 % | HEART RATE: 79 BPM | BODY MASS INDEX: 38.75 KG/M2 | RESPIRATION RATE: 20 BRPM | SYSTOLIC BLOOD PRESSURE: 124 MMHG | DIASTOLIC BLOOD PRESSURE: 88 MMHG | HEIGHT: 66 IN | TEMPERATURE: 97.3 F

## 2021-03-05 DIAGNOSIS — Z79.890 HORMONE REPLACEMENT THERAPY: ICD-10-CM

## 2021-03-05 DIAGNOSIS — G35 MULTIPLE SCLEROSIS (HCC): ICD-10-CM

## 2021-03-05 DIAGNOSIS — Z13.1 SCREENING FOR DIABETES MELLITUS: ICD-10-CM

## 2021-03-05 DIAGNOSIS — R73.9 ELEVATED BLOOD SUGAR: ICD-10-CM

## 2021-03-05 DIAGNOSIS — L30.9 ECZEMA, UNSPECIFIED TYPE: Primary | ICD-10-CM

## 2021-03-05 LAB
ALBUMIN SERPL-MCNC: 4.1 G/DL (ref 3.5–5.2)
ALBUMIN/GLOB SERPL: 1.1 G/DL
ALP SERPL-CCNC: 141 U/L (ref 39–117)
ALT SERPL W P-5'-P-CCNC: 43 U/L (ref 1–33)
ANION GAP SERPL CALCULATED.3IONS-SCNC: 9.7 MMOL/L (ref 5–15)
AST SERPL-CCNC: 72 U/L (ref 1–32)
BILIRUB SERPL-MCNC: 0.2 MG/DL (ref 0–1.2)
BUN SERPL-MCNC: 15 MG/DL (ref 6–20)
BUN/CREAT SERPL: 15 (ref 7–25)
CALCIUM SPEC-SCNC: 9.6 MG/DL (ref 8.6–10.5)
CHLORIDE SERPL-SCNC: 107 MMOL/L (ref 98–107)
CO2 SERPL-SCNC: 24.3 MMOL/L (ref 22–29)
CREAT SERPL-MCNC: 1 MG/DL (ref 0.57–1)
GFR SERPL CREATININE-BSD FRML MDRD: 73 ML/MIN/1.73
GLOBULIN UR ELPH-MCNC: 3.6 GM/DL
GLUCOSE SERPL-MCNC: 93 MG/DL (ref 65–99)
HBA1C MFR BLD: 5.95 % (ref 4.8–5.6)
POTASSIUM SERPL-SCNC: 3.9 MMOL/L (ref 3.5–5.2)
PROT SERPL-MCNC: 7.7 G/DL (ref 6–8.5)
SODIUM SERPL-SCNC: 141 MMOL/L (ref 136–145)

## 2021-03-05 PROCEDURE — 82248 BILIRUBIN DIRECT: CPT | Performed by: NURSE PRACTITIONER

## 2021-03-05 PROCEDURE — 99214 OFFICE O/P EST MOD 30 MIN: CPT | Performed by: NURSE PRACTITIONER

## 2021-03-05 PROCEDURE — 83036 HEMOGLOBIN GLYCOSYLATED A1C: CPT | Performed by: NURSE PRACTITIONER

## 2021-03-05 PROCEDURE — 80053 COMPREHEN METABOLIC PANEL: CPT | Performed by: NURSE PRACTITIONER

## 2021-03-05 RX ORDER — ESTRADIOL 1 MG/1
1 TABLET ORAL DAILY
Qty: 30 TABLET | Refills: 5 | Status: SHIPPED | OUTPATIENT
Start: 2021-03-05 | End: 2021-09-27 | Stop reason: SINTOL

## 2021-03-16 NOTE — PATIENT INSTRUCTIONS
Calorie Counting for Weight Loss  Calories are units of energy. Your body needs a certain amount of calories from food to keep you going throughout the day. When you eat more calories than your body needs, your body stores the extra calories as fat. When you eat fewer calories than your body needs, your body burns fat to get the energy it needs.  Calorie counting means keeping track of how many calories you eat and drink each day. Calorie counting can be helpful if you need to lose weight. If you make sure to eat fewer calories than your body needs, you should lose weight. Ask your health care provider what a healthy weight is for you.  For calorie counting to work, you will need to eat the right number of calories in a day in order to lose a healthy amount of weight per week. A dietitian can help you determine how many calories you need in a day and will give you suggestions on how to reach your calorie goal.  · A healthy amount of weight to lose per week is usually 1-2 lb (0.5-0.9 kg). This usually means that your daily calorie intake should be reduced by 500-750 calories.  · Eating 1,200 - 1,500 calories per day can help most women lose weight.  · Eating 1,500 - 1,800 calories per day can help most men lose weight.  What is my plan?  My goal is to have __________ calories per day.  If I have this many calories per day, I should lose around __________ pounds per week.  What do I need to know about calorie counting?  In order to meet your daily calorie goal, you will need to:  · Find out how many calories are in each food you would like to eat. Try to do this before you eat.  · Decide how much of the food you plan to eat.  · Write down what you ate and how many calories it had. Doing this is called keeping a food log.  To successfully lose weight, it is important to balance calorie counting with a healthy lifestyle that includes regular activity. Aim for 150 minutes of moderate exercise (such as walking) or 75  minutes of vigorous exercise (such as running) each week.  Where do I find calorie information?    The number of calories in a food can be found on a Nutrition Facts label. If a food does not have a Nutrition Facts label, try to look up the calories online or ask your dietitian for help.  Remember that calories are listed per serving. If you choose to have more than one serving of a food, you will have to multiply the calories per serving by the amount of servings you plan to eat. For example, the label on a package of bread might say that a serving size is 1 slice and that there are 90 calories in a serving. If you eat 1 slice, you will have eaten 90 calories. If you eat 2 slices, you will have eaten 180 calories.  How do I keep a food log?  Immediately after each meal, record the following information in your food log:  · What you ate. Don't forget to include toppings, sauces, and other extras on the food.  · How much you ate. This can be measured in cups, ounces, or number of items.  · How many calories each food and drink had.  · The total number of calories in the meal.  Keep your food log near you, such as in a small notebook in your pocket, or use a mobile robert or website. Some programs will calculate calories for you and show you how many calories you have left for the day to meet your goal.  What are some calorie counting tips?    · Use your calories on foods and drinks that will fill you up and not leave you hungry:  ? Some examples of foods that fill you up are nuts and nut butters, vegetables, lean proteins, and high-fiber foods like whole grains. High-fiber foods are foods with more than 5 g fiber per serving.  ? Drinks such as sodas, specialty coffee drinks, alcohol, and juices have a lot of calories, yet do not fill you up.  · Eat nutritious foods and avoid empty calories. Empty calories are calories you get from foods or beverages that do not have many vitamins or protein, such as candy, sweets, and  "soda. It is better to have a nutritious high-calorie food (such as an avocado) than a food with few nutrients (such as a bag of chips).  · Know how many calories are in the foods you eat most often. This will help you calculate calorie counts faster.  · Pay attention to calories in drinks. Low-calorie drinks include water and unsweetened drinks.  · Pay attention to nutrition labels for \"low fat\" or \"fat free\" foods. These foods sometimes have the same amount of calories or more calories than the full fat versions. They also often have added sugar, starch, or salt, to make up for flavor that was removed with the fat.  · Find a way of tracking calories that works for you. Get creative. Try different apps or programs if writing down calories does not work for you.  What are some portion control tips?  · Know how many calories are in a serving. This will help you know how many servings of a certain food you can have.  · Use a measuring cup to measure serving sizes. You could also try weighing out portions on a kitchen scale. With time, you will be able to estimate serving sizes for some foods.  · Take some time to put servings of different foods on your favorite plates, bowls, and cups so you know what a serving looks like.  · Try not to eat straight from a bag or box. Doing this can lead to overeating. Put the amount you would like to eat in a cup or on a plate to make sure you are eating the right portion.  · Use smaller plates, glasses, and bowls to prevent overeating.  · Try not to multitask (for example, watch TV or use your computer) while eating. If it is time to eat, sit down at a table and enjoy your food. This will help you to know when you are full. It will also help you to be aware of what you are eating and how much you are eating.  What are tips for following this plan?  Reading food labels  · Check the calorie count compared to the serving size. The serving size may be smaller than what you are used to " "eating.  · Check the source of the calories. Make sure the food you are eating is high in vitamins and protein and low in saturated and trans fats.  Shopping  · Read nutrition labels while you shop. This will help you make healthy decisions before you decide to purchase your food.  · Make a grocery list and stick to it.  Cooking  · Try to cook your favorite foods in a healthier way. For example, try baking instead of frying.  · Use low-fat dairy products.  Meal planning  · Use more fruits and vegetables. Half of your plate should be fruits and vegetables.  · Include lean proteins like poultry and fish.  How do I count calories when eating out?  · Ask for smaller portion sizes.  · Consider sharing an entree and sides instead of getting your own entree.  · If you get your own entree, eat only half. Ask for a box at the beginning of your meal and put the rest of your entree in it so you are not tempted to eat it.  · If calories are listed on the menu, choose the lower calorie options.  · Choose dishes that include vegetables, fruits, whole grains, low-fat dairy products, and lean protein.  · Choose items that are boiled, broiled, grilled, or steamed. Stay away from items that are buttered, battered, fried, or served with cream sauce. Items labeled \"crispy\" are usually fried, unless stated otherwise.  · Choose water, low-fat milk, unsweetened iced tea, or other drinks without added sugar. If you want an alcoholic beverage, choose a lower calorie option such as a glass of wine or light beer.  · Ask for dressings, sauces, and syrups on the side. These are usually high in calories, so you should limit the amount you eat.  · If you want a salad, choose a garden salad and ask for grilled meats. Avoid extra toppings like salazar, cheese, or fried items. Ask for the dressing on the side, or ask for olive oil and vinegar or lemon to use as dressing.  · Estimate how many servings of a food you are given. For example, a serving of " cooked rice is ½ cup or about the size of half a baseball. Knowing serving sizes will help you be aware of how much food you are eating at restaurants. The list below tells you how big or small some common portion sizes are based on everyday objects:  ? 1 oz--4 stacked dice.  ? 3 oz--1 deck of cards.  ? 1 tsp--1 die.  ? 1 Tbsp--½ a ping-pong ball.  ? 2 Tbsp--1 ping-pong ball.  ? ½ cup--½ baseball.  ? 1 cup--1 baseball.  Summary  · Calorie counting means keeping track of how many calories you eat and drink each day. If you eat fewer calories than your body needs, you should lose weight.  · A healthy amount of weight to lose per week is usually 1-2 lb (0.5-0.9 kg). This usually means reducing your daily calorie intake by 500-750 calories.  · The number of calories in a food can be found on a Nutrition Facts label. If a food does not have a Nutrition Facts label, try to look up the calories online or ask your dietitian for help.  · Use your calories on foods and drinks that will fill you up, and not on foods and drinks that will leave you hungry.  · Use smaller plates, glasses, and bowls to prevent overeating.  This information is not intended to replace advice given to you by your health care provider. Make sure you discuss any questions you have with your health care provider.  Document Revised: 09/06/2019 Document Reviewed: 11/17/2017  [employer] Patient Education © 2020 [employer]      Exercising to Lose Weight  Exercise is structured, repetitive physical activity to improve fitness and health. Getting regular exercise is important for everyone. It is especially important if you are overweight. Being overweight increases your risk of heart disease, stroke, diabetes, high blood pressure, and several types of cancer. Reducing your calorie intake and exercising can help you lose weight.  Exercise is usually categorized as moderate or vigorous intensity. To lose weight, most people need to do a certain amount of  moderate-intensity or vigorous-intensity exercise each week.  Moderate-intensity exercise    Moderate-intensity exercise is any activity that gets you moving enough to burn at least three times more energy (calories) than if you were sitting.  Examples of moderate exercise include:  · Walking a mile in 15 minutes.  · Doing light yard work.  · Biking at an easy pace.  Most people should get at least 150 minutes (2 hours and 30 minutes) a week of moderate-intensity exercise to maintain their body weight.  Vigorous-intensity exercise  Vigorous-intensity exercise is any activity that gets you moving enough to burn at least six times more calories than if you were sitting. When you exercise at this intensity, you should be working hard enough that you are not able to carry on a conversation.  Examples of vigorous exercise include:  · Running.  · Playing a team sport, such as football, basketball, and soccer.  · Jumping rope.  Most people should get at least 75 minutes (1 hour and 15 minutes) a week of vigorous-intensity exercise to maintain their body weight.  How can exercise affect me?  When you exercise enough to burn more calories than you eat, you lose weight. Exercise also reduces body fat and builds muscle. The more muscle you have, the more calories you burn. Exercise also:  · Improves mood.  · Reduces stress and tension.  · Improves your overall fitness, flexibility, and endurance.  · Increases bone strength.  The amount of exercise you need to lose weight depends on:  · Your age.  · The type of exercise.  · Any health conditions you have.  · Your overall physical ability.  Talk to your health care provider about how much exercise you need and what types of activities are safe for you.  What actions can I take to lose weight?  Nutrition    · Make changes to your diet as told by your health care provider or diet and nutrition specialist (dietitian). This may include:  ? Eating fewer calories.  ? Eating more  protein.  ? Eating less unhealthy fats.  ? Eating a diet that includes fresh fruits and vegetables, whole grains, low-fat dairy products, and lean protein.  ? Avoiding foods with added fat, salt, and sugar.  · Drink plenty of water while you exercise to prevent dehydration or heat stroke.  Activity  · Choose an activity that you enjoy and set realistic goals. Your health care provider can help you make an exercise plan that works for you.  · Exercise at a moderate or vigorous intensity most days of the week.  ? The intensity of exercise may vary from person to person. You can tell how intense a workout is for you by paying attention to your breathing and heartbeat. Most people will notice their breathing and heartbeat get faster with more intense exercise.  · Do resistance training twice each week, such as:  ? Push-ups.  ? Sit-ups.  ? Lifting weights.  ? Using resistance bands.  · Getting short amounts of exercise can be just as helpful as long structured periods of exercise. If you have trouble finding time to exercise, try to include exercise in your daily routine.  ? Get up, stretch, and walk around every 30 minutes throughout the day.  ? Go for a walk during your lunch break.  ? Park your car farther away from your destination.  ? If you take public transportation, get off one stop early and walk the rest of the way.  ? Make phone calls while standing up and walking around.  ? Take the stairs instead of elevators or escalators.  · Wear comfortable clothes and shoes with good support.  · Do not exercise so much that you hurt yourself, feel dizzy, or get very short of breath.  Where to find more information  · [place] Department of Health and Human Services: www.hhs.gov  · Centers for Disease Control and Prevention (CDC): www.cdc.gov  Contact a health care provider:  · Before starting a new exercise program.  · If you have questions or concerns about your weight.  · If you have a medical problem that keeps you from  exercising.  Get help right away if you have any of the following while exercising:  · Injury.  · Dizziness.  · Difficulty breathing or shortness of breath that does not go away when you stop exercising.  · Chest pain.  · Rapid heartbeat.  Summary  · Being overweight increases your risk of heart disease, stroke, diabetes, high blood pressure, and several types of cancer.  · Losing weight happens when you burn more calories than you eat.  · Reducing the amount of calories you eat in addition to getting regular moderate or vigorous exercise each week helps you lose weight.  This information is not intended to replace advice given to you by your health care provider. Make sure you discuss any questions you have with your health care provider.  Document Revised: 12/31/2018 Document Reviewed: 12/31/2018  [employer] Patient Education © 2020 [employer]

## 2021-03-16 NOTE — PROGRESS NOTES
Subjective   Jenn Good is a 45 y.o. female.     Jenn Thorne comes in today for recheck.  She has a history of MS and does have some pain associated with it.  She had an infusion over the last couple days and she does feel little bit better.  She is however worried about the possibility of diabetes due to amount of steroids that she received.  She does have a history of eczema and is having a flare with that with some breakout.  She needs refills on other medications.    Rash  This is a recurrent problem. The current episode started 1 to 4 weeks ago. The problem is unchanged. The rash is diffuse. The rash is characterized by scaling and itchiness. She was exposed to nothing. Pertinent negatives include no anorexia, congestion, cough, diarrhea, eye pain, facial edema, fatigue, fever, joint pain, nail changes, rhinorrhea, shortness of breath, sore throat or vomiting. Past treatments include topical steroids. The treatment provided significant relief. Her past medical history is significant for eczema.   Night Sweats  This is a chronic problem. The current episode started more than 1 year ago. The problem occurs constantly. The problem has been waxing and waning. Associated symptoms include arthralgias and a rash. Pertinent negatives include no abdominal pain, anorexia, change in bowel habit, chest pain, chills, congestion, coughing, diaphoresis, fatigue, fever, headaches, joint swelling, myalgias, nausea, neck pain, numbness, sore throat, swollen glands, urinary symptoms, vertigo, visual change, vomiting or weakness. Nothing aggravates the symptoms. Treatments tried: estrogen. The treatment provided significant relief.        The following portions of the patient's history were reviewed and updated as appropriate: allergies, current medications, past family history, past medical history, past social history, past surgical history and problem list.    Review of Systems   Constitutional: Positive for night  sweats. Negative for chills, diaphoresis, fatigue and fever.   HENT: Negative.  Negative for congestion, rhinorrhea and sore throat.    Eyes: Negative.  Negative for pain.   Respiratory: Negative.  Negative for cough and shortness of breath.    Cardiovascular: Negative.  Negative for chest pain.   Gastrointestinal: Negative.  Negative for abdominal pain, anorexia, change in bowel habit, diarrhea, nausea and vomiting.   Genitourinary: Negative.    Musculoskeletal: Positive for arthralgias and gait problem. Negative for joint pain, joint swelling, myalgias and neck pain.   Skin: Positive for rash. Negative for nail changes.   Neurological: Negative for vertigo, weakness, numbness and headaches.   Psychiatric/Behavioral: Negative.        Objective   Physical Exam  Vitals and nursing note reviewed.   Constitutional:       General: She is not in acute distress.     Appearance: She is well-developed.   HENT:      Head: Normocephalic and atraumatic.      Right Ear: External ear normal.      Left Ear: External ear normal.      Nose: Nose normal.      Mouth/Throat:      Pharynx: No oropharyngeal exudate.   Eyes:      Pupils: Pupils are equal, round, and reactive to light.   Neck:      Thyroid: No thyromegaly.   Cardiovascular:      Rate and Rhythm: Normal rate and regular rhythm.      Heart sounds: Normal heart sounds. No murmur. No friction rub.   Pulmonary:      Effort: Pulmonary effort is normal. No respiratory distress.      Breath sounds: Normal breath sounds. No wheezing or rales.   Abdominal:      Palpations: Abdomen is soft.   Musculoskeletal:         General: Normal range of motion.      Cervical back: Normal range of motion and neck supple.   Skin:     General: Skin is warm and dry.      Comments: Scaly arms on her arms and her legs.   Neurological:      Mental Status: She is alert and oriented to person, place, and time.   Psychiatric:         Thought Content: Thought content normal.         Assessment/Plan    Diagnoses and all orders for this visit:    1. Eczema, unspecified type (Primary)  -     triamcinolone (KENALOG) 0.1 % ointment; Apply  topically to the appropriate area as directed 2 (Two) Times a Day.  Dispense: 80 g; Refill: 3    2. Hormone replacement therapy  -     estradiol (ESTRACE) 1 MG tablet; Take 1 tablet by mouth Daily.  Dispense: 30 tablet; Refill: 5    3. Screening for diabetes mellitus  -     Comprehensive metabolic panel    4. Elevated blood sugar  -     Hemoglobin A1c      Lab Results   Component Value Date    HGBA1C 5.95 (H) 03/05/2021     bmi to be 38.9 which is considered obese.  Diet and exercise information will be provided to the patient.

## 2021-03-19 DIAGNOSIS — G89.29 CHRONIC PAIN OF RIGHT KNEE: ICD-10-CM

## 2021-03-19 DIAGNOSIS — G35 MULTIPLE SCLEROSIS (HCC): ICD-10-CM

## 2021-03-19 DIAGNOSIS — M25.561 CHRONIC PAIN OF RIGHT KNEE: ICD-10-CM

## 2021-03-19 RX ORDER — HYDROCODONE BITARTRATE AND ACETAMINOPHEN 7.5; 325 MG/1; MG/1
1 TABLET ORAL EVERY 8 HOURS PRN
Qty: 90 TABLET | Refills: 0 | Status: SHIPPED | OUTPATIENT
Start: 2021-03-19 | End: 2021-04-23 | Stop reason: SDUPTHER

## 2021-03-25 DIAGNOSIS — G35 MULTIPLE SCLEROSIS (HCC): Primary | ICD-10-CM

## 2021-03-31 ENCOUNTER — INFUSION (OUTPATIENT)
Dept: ONCOLOGY | Facility: HOSPITAL | Age: 46
End: 2021-03-31

## 2021-03-31 VITALS
SYSTOLIC BLOOD PRESSURE: 124 MMHG | HEART RATE: 75 BPM | DIASTOLIC BLOOD PRESSURE: 64 MMHG | TEMPERATURE: 96.7 F | RESPIRATION RATE: 18 BRPM

## 2021-03-31 DIAGNOSIS — G35 MULTIPLE SCLEROSIS (HCC): Primary | ICD-10-CM

## 2021-03-31 PROCEDURE — A9270 NON-COVERED ITEM OR SERVICE: HCPCS | Performed by: NURSE PRACTITIONER

## 2021-03-31 PROCEDURE — 63710000001 ACETAMINOPHEN 325 MG TABLET: Performed by: NURSE PRACTITIONER

## 2021-03-31 PROCEDURE — 25010000002 NATALIZUMAB PER 1 MG: Performed by: NURSE PRACTITIONER

## 2021-03-31 PROCEDURE — 96365 THER/PROPH/DIAG IV INF INIT: CPT | Performed by: NURSE PRACTITIONER

## 2021-03-31 RX ORDER — SODIUM CHLORIDE 9 MG/ML
250 INJECTION, SOLUTION INTRAVENOUS ONCE
Status: CANCELLED | OUTPATIENT
Start: 2021-04-28

## 2021-03-31 RX ORDER — DIPHENHYDRAMINE HYDROCHLORIDE 50 MG/ML
25 INJECTION INTRAMUSCULAR; INTRAVENOUS AS NEEDED
Status: CANCELLED
Start: 2021-04-28

## 2021-03-31 RX ORDER — SODIUM CHLORIDE 9 MG/ML
250 INJECTION, SOLUTION INTRAVENOUS ONCE
Status: COMPLETED | OUTPATIENT
Start: 2021-03-31 | End: 2021-03-31

## 2021-03-31 RX ORDER — ACETAMINOPHEN 325 MG/1
650 TABLET ORAL ONCE
Status: COMPLETED | OUTPATIENT
Start: 2021-03-31 | End: 2021-03-31

## 2021-03-31 RX ORDER — ONDANSETRON 4 MG/1
8 TABLET, ORALLY DISINTEGRATING ORAL ONCE
Status: CANCELLED
Start: 2021-04-28 | End: 2021-04-28

## 2021-03-31 RX ORDER — ONDANSETRON 4 MG/1
8 TABLET, ORALLY DISINTEGRATING ORAL ONCE
Status: DISCONTINUED | OUTPATIENT
Start: 2021-03-31 | End: 2021-03-31 | Stop reason: HOSPADM

## 2021-03-31 RX ORDER — LORATADINE 10 MG/1
10 TABLET ORAL DAILY
Status: DISCONTINUED | OUTPATIENT
Start: 2021-03-31 | End: 2021-03-31 | Stop reason: HOSPADM

## 2021-03-31 RX ORDER — LORATADINE 10 MG/1
10 TABLET ORAL DAILY
Status: CANCELLED
Start: 2021-04-28

## 2021-03-31 RX ORDER — ACETAMINOPHEN 325 MG/1
650 TABLET ORAL ONCE
Status: CANCELLED | OUTPATIENT
Start: 2021-04-28

## 2021-03-31 RX ORDER — ONDANSETRON 2 MG/ML
4 INJECTION INTRAMUSCULAR; INTRAVENOUS AS NEEDED
Status: CANCELLED
Start: 2021-04-28

## 2021-03-31 RX ADMIN — ACETAMINOPHEN 650 MG: 325 TABLET ORAL at 13:34

## 2021-03-31 RX ADMIN — NATALIZUMAB 300 MG: 300 INJECTION INTRAVENOUS at 13:41

## 2021-03-31 RX ADMIN — SODIUM CHLORIDE 250 ML: 9 INJECTION, SOLUTION INTRAVENOUS at 13:41

## 2021-04-05 ENCOUNTER — LAB (OUTPATIENT)
Dept: LAB | Facility: HOSPITAL | Age: 46
End: 2021-04-05

## 2021-04-05 ENCOUNTER — OFFICE VISIT (OUTPATIENT)
Dept: FAMILY MEDICINE CLINIC | Facility: CLINIC | Age: 46
End: 2021-04-05

## 2021-04-05 VITALS
BODY MASS INDEX: 39.52 KG/M2 | TEMPERATURE: 97.3 F | HEIGHT: 66 IN | SYSTOLIC BLOOD PRESSURE: 130 MMHG | WEIGHT: 245.9 LBS | HEART RATE: 67 BPM | OXYGEN SATURATION: 100 % | DIASTOLIC BLOOD PRESSURE: 70 MMHG

## 2021-04-05 DIAGNOSIS — K14.0 GLOSSITIS: ICD-10-CM

## 2021-04-05 DIAGNOSIS — Z91.89 RISK FACTORS FOR OBSTRUCTIVE SLEEP APNEA: ICD-10-CM

## 2021-04-05 DIAGNOSIS — G89.29 CHRONIC PAIN OF RIGHT KNEE: ICD-10-CM

## 2021-04-05 DIAGNOSIS — R22.0 TONGUE SWELLING: Primary | ICD-10-CM

## 2021-04-05 DIAGNOSIS — D64.9 ANEMIA, UNSPECIFIED TYPE: ICD-10-CM

## 2021-04-05 DIAGNOSIS — M25.561 CHRONIC PAIN OF RIGHT KNEE: ICD-10-CM

## 2021-04-05 DIAGNOSIS — R22.0 TONGUE SWELLING: ICD-10-CM

## 2021-04-05 DIAGNOSIS — G35 MULTIPLE SCLEROSIS (HCC): ICD-10-CM

## 2021-04-05 DIAGNOSIS — K21.9 GASTROESOPHAGEAL REFLUX DISEASE, UNSPECIFIED WHETHER ESOPHAGITIS PRESENT: ICD-10-CM

## 2021-04-05 DIAGNOSIS — T78.3XXA ANGIOEDEMA, INITIAL ENCOUNTER: ICD-10-CM

## 2021-04-05 PROBLEM — F32.A DEPRESSION: Status: ACTIVE | Noted: 2018-09-25

## 2021-04-05 PROBLEM — E55.9 VITAMIN D DEFICIENCY: Status: ACTIVE | Noted: 2018-09-25

## 2021-04-05 PROBLEM — M79.2 NEUROPATHIC PAIN: Status: ACTIVE | Noted: 2018-09-25

## 2021-04-05 LAB
ALBUMIN SERPL-MCNC: 3.8 G/DL (ref 3.5–5.2)
ALP SERPL-CCNC: 145 U/L (ref 39–117)
ALT SERPL W P-5'-P-CCNC: 26 U/L (ref 1–33)
AST SERPL-CCNC: 30 U/L (ref 1–32)
BILIRUB CONJ SERPL-MCNC: <0.2 MG/DL (ref 0–0.3)
BILIRUB INDIRECT SERPL-MCNC: ABNORMAL MG/DL
BILIRUB SERPL-MCNC: 0.2 MG/DL (ref 0–1.2)
FERRITIN SERPL-MCNC: 28.7 NG/ML (ref 13–150)
IRON 24H UR-MRATE: 52 MCG/DL (ref 37–145)
IRON SATN MFR SERPL: 14 % (ref 20–50)
PROT SERPL-MCNC: 6.6 G/DL (ref 6–8.5)
TIBC SERPL-MCNC: 384 MCG/DL (ref 298–536)
TRANSFERRIN SERPL-MCNC: 258 MG/DL (ref 200–360)
VIT B12 BLD-MCNC: 277 PG/ML (ref 211–946)

## 2021-04-05 PROCEDURE — 80076 HEPATIC FUNCTION PANEL: CPT

## 2021-04-05 PROCEDURE — 82607 VITAMIN B-12: CPT | Performed by: STUDENT IN AN ORGANIZED HEALTH CARE EDUCATION/TRAINING PROGRAM

## 2021-04-05 PROCEDURE — 84466 ASSAY OF TRANSFERRIN: CPT | Performed by: STUDENT IN AN ORGANIZED HEALTH CARE EDUCATION/TRAINING PROGRAM

## 2021-04-05 PROCEDURE — 86160 COMPLEMENT ANTIGEN: CPT

## 2021-04-05 PROCEDURE — 82728 ASSAY OF FERRITIN: CPT | Performed by: STUDENT IN AN ORGANIZED HEALTH CARE EDUCATION/TRAINING PROGRAM

## 2021-04-05 PROCEDURE — 83540 ASSAY OF IRON: CPT | Performed by: STUDENT IN AN ORGANIZED HEALTH CARE EDUCATION/TRAINING PROGRAM

## 2021-04-05 PROCEDURE — 99214 OFFICE O/P EST MOD 30 MIN: CPT | Performed by: STUDENT IN AN ORGANIZED HEALTH CARE EDUCATION/TRAINING PROGRAM

## 2021-04-05 RX ORDER — LORATADINE 10 MG/1
10 TABLET ORAL DAILY
Qty: 90 TABLET | Refills: 1 | Status: SHIPPED | OUTPATIENT
Start: 2021-04-05 | End: 2021-12-27 | Stop reason: SDUPTHER

## 2021-04-05 RX ORDER — OMEPRAZOLE 40 MG/1
40 CAPSULE, DELAYED RELEASE ORAL DAILY
Qty: 90 CAPSULE | Refills: 1 | Status: SHIPPED | OUTPATIENT
Start: 2021-04-05 | End: 2023-03-30 | Stop reason: SDUPTHER

## 2021-04-05 NOTE — PROGRESS NOTES
Subjective:  Jenn Good is a 45 y.o. female with history of MS who presents for tongue swelling    Tongue swelling; intermittent, has happened for years. Not worse certain times of the year, does snore. Has never caused her issues with breathing, no medication changes, no new environmental exposures, does not work. Was not exposed to any chemicals. No wheezing, no SOA.     Additionally states when she walks has bilateral hand swelling; has been on going for years. Lasts for a couple hours, feels tightness in her hand, no rash or itchiness, chest pain.  Denies history of heart disease, is on diltiazem for HTN. BP has been up and down, no vision changes, headaches, abdominal pain or leg swelling.     CKD stage 2; states that sees a nephrologist, believes she has too much protein in her urine, has appropriate follow up; Is on Telmisartan 40 mg daily, tolerating well no adverse effects.    Insomnia; does not take every night, Trazodone 150mg PRN.      MS; states sees neurologist in [place], is on injectable natalizumab, has not had a MS flare in ~ 3 years. Tolerating well, no adverse effects.      Right knee pain; states has had surgery in right knee, states had a meniscus repair ~ 25 years ago. Is on Norco 7.5mg for the past 3-4 months. States helps with her chronic pain, tolerating well, states allows her to be more active. Without it is not able to function without it.       GERD; had an EGD ~ 8 years ago, states had an ulcer, was on omeprazole, stopped taking it after she started feeling better. Has been getting worse of the past 2 month, no hematochezia or dark tarry stools, no abdominal pain.     Patient Active Problem List   Diagnosis   • Rash   • Gastroesophageal reflux disease   • Primary insomnia   • Hormone replacement therapy   • Hypertension   • Low back pain with right-sided sciatica   • Candida, oral   • Candida vaginitis   • Nausea and vomiting   • Acute URI   • Multiple sclerosis (CMS/HCC)   •  "Impacted cerumen of right ear   • Chronic low back pain   • Acute sinusitis   • Sinus headache   • Vitamin D deficiency   • Neuropathic pain   • Depression     Vitals:    Vitals:    04/05/21 0805   BP: 130/70   BP Location: Left arm   Patient Position: Sitting   Cuff Size: Adult   Pulse: 67   Temp: 97.3 °F (36.3 °C)   TempSrc: Temporal   SpO2: 100%   Weight: 112 kg (245 lb 14.4 oz)   Height: 167.6 cm (66\")     Body mass index is 39.69 kg/m².    Current Outpatient Medications:   •  baclofen (LIORESAL) 20 MG tablet, , Disp: , Rfl:   •  dilTIAZem CD (Cartia XT) 240 MG 24 hr capsule, Take 1 capsule by mouth Daily., Disp: 30 capsule, Rfl: 5  •  DULoxetine (CYMBALTA) 30 MG capsule, Take 30 mg by mouth Daily., Disp: , Rfl:   •  estradiol (ESTRACE) 1 MG tablet, Take 1 tablet by mouth Daily., Disp: 30 tablet, Rfl: 5  •  HYDROcodone-acetaminophen (NORCO) 7.5-325 MG per tablet, Take 1 tablet by mouth Every 8 (Eight) Hours As Needed for Moderate Pain ., Disp: 90 tablet, Rfl: 0  •  melatonin 5 MG tablet tablet, Take 5 mg by mouth., Disp: , Rfl:   •  natalizumab (TYSABRI) 300 MG/15ML injection, Infuse  into a venous catheter., Disp: , Rfl:   •  telmisartan (MICARDIS) 40 MG tablet, Take 40 mg by mouth Daily., Disp: , Rfl: 3  •  tiZANidine (ZANAFLEX) 4 MG tablet, Take 4 mg by mouth At Night As Needed for muscle spasms., Disp: , Rfl:   •  traZODone (DESYREL) 150 MG tablet, Take 1 tablet by mouth Every Night., Disp: 30 tablet, Rfl: 5  •  diclofenac (VOLTAREN) 75 MG EC tablet, Take 1 tablet by mouth 2 (Two) Times a Day., Disp: 60 tablet, Rfl: 5  •  docusate sodium (COLACE) 100 MG capsule, Take 1 capsule by mouth 2 (Two) Times a Day., Disp: 60 capsule, Rfl: 0  •  fluticasone (FLONASE) 50 MCG/ACT nasal spray, 2 sprays into the nostril(s) as directed by provider Daily., Disp: 16 g, Rfl: 0  •  loratadine (Claritin) 10 MG tablet, Take 1 tablet by mouth Daily., Disp: 90 tablet, Rfl: 1  •  omeprazole (priLOSEC) 40 MG capsule, Take 1 capsule " by mouth Daily., Disp: 90 capsule, Rfl: 1  •  SUMAtriptan (IMITREX) 50 MG tablet, , Disp: , Rfl:   •  triamcinolone (KENALOG) 0.1 % ointment, Apply  topically to the appropriate area as directed 2 (Two) Times a Day., Disp: 80 g, Rfl: 3    Review of Systems  Review of Systems   Constitutional: Negative for appetite change and fever.   HENT: Negative for sore throat.    Eyes: Negative for discharge and visual disturbance.   Respiratory: Negative for cough and shortness of breath.    Cardiovascular: Negative for chest pain, palpitations and leg swelling.   Gastrointestinal: Negative for abdominal pain, diarrhea and vomiting.   Genitourinary: Negative for dysuria.   Skin: Negative for rash.   Neurological: Negative for light-headedness and headaches.   Psychiatric/Behavioral: Negative for agitation.       Patient Active Problem List   Diagnosis   • Rash   • Gastroesophageal reflux disease   • Primary insomnia   • Hormone replacement therapy   • Hypertension   • Low back pain with right-sided sciatica   • Candida, oral   • Candida vaginitis   • Nausea and vomiting   • Acute URI   • Multiple sclerosis (CMS/HCC)   • Impacted cerumen of right ear   • Chronic low back pain   • Acute sinusitis   • Sinus headache   • Vitamin D deficiency   • Neuropathic pain   • Depression     Past Surgical History:   Procedure Laterality Date   •  SECTION     • HYSTERECTOMY     • INJECTION OF MEDICATION  2016    Celestone (betamethasone) (1)      • INJECTION OF MEDICATION  05/10/2016    Rocephin (1)      • INJECTION OF MEDICATION  2015    Toradol (1)      • INJECTION OF MEDICATION  2015    Zofran (1)      • TUBAL ABDOMINAL LIGATION      Examination under anesthesia and laparoscopic tubal.   • UPPER GASTROINTESTINAL ENDOSCOPY  2015    Hiatal hernia. Gastritis. Unspecified gastric ulcer. Performed at [facility].     Social History     Socioeconomic History   • Marital status: Single     Spouse name:  Not on file   • Number of children: Not on file   • Years of education: Not on file   • Highest education level: Not on file   Tobacco Use   • Smoking status: Never Smoker   • Smokeless tobacco: Never Used   Substance and Sexual Activity   • Alcohol use: Yes     Comment: pt states she drinks about 2 a week   • Drug use: No   • Sexual activity: Defer     Family History   Problem Relation Age of Onset   • Cancer Maternal Grandmother    • Cancer Maternal Grandfather    • Cancer Paternal Grandmother    • Cancer Paternal Grandfather    • Breast cancer Other      Office Visit on 03/05/2021   Component Date Value Ref Range Status   • Glucose 03/05/2021 93  65 - 99 mg/dL Final   • BUN 03/05/2021 15  6 - 20 mg/dL Final   • Creatinine 03/05/2021 1.00  0.57 - 1.00 mg/dL Final   • Sodium 03/05/2021 141  136 - 145 mmol/L Final   • Potassium 03/05/2021 3.9  3.5 - 5.2 mmol/L Final   • Chloride 03/05/2021 107  98 - 107 mmol/L Final   • CO2 03/05/2021 24.3  22.0 - 29.0 mmol/L Final   • Calcium 03/05/2021 9.6  8.6 - 10.5 mg/dL Final   • Total Protein 03/05/2021 7.7  6.0 - 8.5 g/dL Final   • Albumin 03/05/2021 4.10  3.50 - 5.20 g/dL Final   • ALT (SGPT) 03/05/2021 43* 1 - 33 U/L Final   • AST (SGOT) 03/05/2021 72* 1 - 32 U/L Final   • Alkaline Phosphatase 03/05/2021 141* 39 - 117 U/L Final   • Total Bilirubin 03/05/2021 0.2  0.0 - 1.2 mg/dL Final   • eGFR   Amer 03/05/2021 73  >60 mL/min/1.73 Final   • Globulin 03/05/2021 3.6  gm/dL Final   • A/G Ratio 03/05/2021 1.1  g/dL Final   • BUN/Creatinine Ratio 03/05/2021 15.0  7.0 - 25.0 Final   • Anion Gap 03/05/2021 9.7  5.0 - 15.0 mmol/L Final   • Hemoglobin A1C 03/05/2021 5.95* 4.80 - 5.60 % Final   Results Encounter on 01/01/2021   Component Date Value Ref Range Status   • Bilirubin, Direct 03/05/2021 <0.2  0.0 - 0.3 mg/dL Final   Infusion on 10/14/2020   Component Date Value Ref Range Status   • WBC 10/14/2020 7.57  3.40 - 10.80 10*3/mm3 Final   • RBC 10/14/2020 4.38  3.77 -  5.28 10*6/mm3 Final   • Hemoglobin 10/14/2020 11.1* 12.0 - 15.9 g/dL Final   • Hematocrit 10/14/2020 35.2  34.0 - 46.6 % Final   • MCV 10/14/2020 80.4  79.0 - 97.0 fL Final   • MCH 10/14/2020 25.3* 26.6 - 33.0 pg Final   • MCHC 10/14/2020 31.5  31.5 - 35.7 g/dL Final   • RDW 10/14/2020 17.5* 12.3 - 15.4 % Final   • RDW-SD 10/14/2020 51.5  37.0 - 54.0 fl Final   • MPV 10/14/2020 10.5  6.0 - 12.0 fL Final   • Platelets 10/14/2020 285  140 - 450 10*3/mm3 Final   • Neutrophil % 10/14/2020 44.2  42.7 - 76.0 % Final   • Lymphocyte % 10/14/2020 43.7  19.6 - 45.3 % Final   • Monocyte % 10/14/2020 7.5  5.0 - 12.0 % Final   • Eosinophil % 10/14/2020 3.3  0.3 - 6.2 % Final   • Basophil % 10/14/2020 1.2  0.0 - 1.5 % Final   • Immature Grans % 10/14/2020 0.1  0.0 - 0.5 % Final   • Neutrophils, Absolute 10/14/2020 3.34  1.70 - 7.00 10*3/mm3 Final   • Lymphocytes, Absolute 10/14/2020 3.31* 0.70 - 3.10 10*3/mm3 Final   • Monocytes, Absolute 10/14/2020 0.57  0.10 - 0.90 10*3/mm3 Final   • Eosinophils, Absolute 10/14/2020 0.25  0.00 - 0.40 10*3/mm3 Final   • Basophils, Absolute 10/14/2020 0.09  0.00 - 0.20 10*3/mm3 Final   • Immature Grans, Absolute 10/14/2020 0.01  0.00 - 0.05 10*3/mm3 Final   • nRBC 10/14/2020 0.4* 0.0 - 0.2 /100 WBC Final   • Index Value 10/14/2020 0.36   Final   • JCV Antibody 10/14/2020 Comment   Final    Results are Indeterminate.  Index interpretive criteria:           <0.20 negative       0.20-0.40 indeterminate           >0.40 positive   • Interpretation 10/14/2020 Comment   Final    Comment: Negative: Antibodies to JCV not detected.  Indeterminate: Low level reactivity detected, see Inhibition                 Assay result to follow for the final antibody                 result.  Positive: Antibodies to DESTINY virus (JCV) detected indicating            the patient has been exposed to JCV at an            undetermined time.  The STRATIFY JCV Antibody Test is an enzyme-linked  immunosorbent assay (CHANTEL) designed  to detect JCV antibodies  to help identify individuals who have been exposed to the  virus. Samples with low level reactivity in the detection  assay are retested in a confirmation (inhibition) assay to  confirm presence or absence of JCV-specific antibodies.  Retrospective analyses of post marketing data from various  sources, including observational studies and spontaneous  reports obtained worldwide, suggest that the risk of  developing PML may be associated with relative levels of  serum anti-JCV antibody as measured by anti-JCV antibody  index.1                                                                               .  1TYSABRI (natalizumab) [place] Prescribing Information.   • JCV ANTIBODY BY INHIBITION 10/14/2020 Negative   Final   • Interpretation 10/14/2020 Comment   Final    Positive: Antibodies to DESTINY virus (JCV) detected            indicating the patient has been exposed            to JCV at an undetermined time  Negative: Antibodies to JCV not detected      XR ankle 3+ vw bilateral  Narrative: PROCEDURE: 3 views of the right and left ankle    COMPARISON: No comparison     HISTORY: Ankle pain    FINDINGS: 3 weightbearing views of the right and left ankle reviewed.  No   fracture or dislocation.  Ankle mortise ease are well aligned.  No osseous   erosion or suspicious osseous lesion.  Normal bone mineral density.  No   increased soft tissue density.   Impression:  Negative for acute osseous or articular abnormality.       [unfilled]  Immunization History   Administered Date(s) Administered   • Flu Vaccine Intradermal Quad 18-64YR 12/14/2012   • Flu Vaccine Quad PF >36MO 12/01/2011, 11/04/2013   • Hep A / Hep B 01/14/2011   • Hepatitis B 07/12/2010, 08/31/2010   • Tdap 01/14/2011     The following portions of the patient's history were reviewed and updated as appropriate: allergies, current medications, past family history, past medical history, past social history, past surgical history and problem  list.    Physical Exam  Physical Exam  Constitutional:       Appearance: Normal appearance. She is obese.   HENT:      Head: Normocephalic and atraumatic.      Comments: Pale, enlarged tongue.      Right Ear: Tympanic membrane and ear canal normal.      Left Ear: Tympanic membrane and ear canal normal.      Mouth/Throat:      Tongue: No lesions.      Palate: No mass and lesions.      Pharynx: Uvula midline.      Tonsils: No tonsillar exudate or tonsillar abscesses.   Eyes:      General:         Right eye: No discharge.         Left eye: No discharge.      Conjunctiva/sclera: Conjunctivae normal.   Cardiovascular:      Rate and Rhythm: Normal rate and regular rhythm.      Pulses: Normal pulses.      Heart sounds: Normal heart sounds. No murmur heard.     Pulmonary:      Effort: Pulmonary effort is normal. No respiratory distress.      Breath sounds: Normal breath sounds.   Abdominal:      General: There is no distension.      Palpations: Abdomen is soft.      Tenderness: There is no abdominal tenderness.   Musculoskeletal:      Cervical back: Normal range of motion.   Lymphadenopathy:      Cervical: No cervical adenopathy.   Neurological:      Mental Status: She is alert. Mental status is at baseline.   Psychiatric:         Mood and Affect: Mood normal.         Behavior: Behavior normal.       Assessment/Plan    Diagnosis Plan   1. Tongue swelling  Ambulatory Referral to Allergy    loratadine (Claritin) 10 MG tablet    C4+C3   2. Chronic pain of right knee  Ambulatory Referral to Orthopedic Surgery   3. Gastroesophageal reflux disease, unspecified whether esophagitis present  omeprazole (priLOSEC) 40 MG capsule   4. Risk factors for obstructive sleep apnea  Ambulatory Referral to Sleep Medicine   5. Angioedema, initial encounter        Orders Placed This Encounter   Procedures   • C4+C3     Standing Status:   Future     Standing Expiration Date:   4/5/2022     Order Specific Question:   Release to patient     Answer:    Immediate   • Ambulatory Referral to Orthopedic Surgery     Referral Priority:   Routine     Referral Type:   Consultation     Referral Reason:   Specialty Services Required     Requested Specialty:   Orthopedic Surgery     Number of Visits Requested:   1   • Ambulatory Referral to Allergy     Referral Priority:   Routine     Referral Type:   Consultation     Referral Reason:   Specialty Services Required     Requested Specialty:   Allergy     Number of Visits Requested:   1   • Ambulatory Referral to Sleep Medicine     Referral Priority:   Routine     Referral Type:   Consultation     Referral Reason:   Specialty Services Required     Requested Specialty:   Sleep Medicine     Number of Visits Requested:   1     Glossitis; unclear etiology, will treat with allergy medications, refer to allergy specialist for testing.  Additionally, patient with history of anemia, will obtain iron panel, B12, C3, C4 to rule out hereditary angioedema.  Follow-up in 1 month, strict ED precautions given.    Insomnia; associated with daytime fatigue, snoring, will refer to to sleep medicine for sleep study to rule out KACIE    GERD; patient currently with exacerbation of acid reflux, treated previously with omeprazole, will trial again, consider repeat EGD.  No red flags.    Chronic pain; discussed risk of chronic opiate use especially young age, will refer to Ortho for possible injections versus surgery on right knee, offered physical therapy but patient declined, will continue to wean pain medication, patient agreeable to plan.       This document has been electronically signed by Nehemiah Chow MD on April 5, 2021 08:53 CDT

## 2021-04-06 LAB
C3 SERPL-MCNC: 138 MG/DL (ref 82–167)
C4 SERPL-MCNC: 43 MG/DL (ref 12–38)

## 2021-04-12 LAB
CONV INDEX VALUE: 0.46
INTERPRETATION: ABNORMAL
INTERPRETATION: ABNORMAL
JCPYV AB SERPL QL IA: POSITIVE

## 2021-04-15 LAB
CONV INDEX VALUE: 0.42
INTERPRETATION: ABNORMAL
INTERPRETATION: ABNORMAL
JCPYV AB SERPL QL IA: POSITIVE

## 2021-04-21 ENCOUNTER — OFFICE VISIT (OUTPATIENT)
Dept: ORTHOPEDIC SURGERY | Facility: CLINIC | Age: 46
End: 2021-04-21

## 2021-04-21 VITALS
SYSTOLIC BLOOD PRESSURE: 130 MMHG | TEMPERATURE: 97.3 F | BODY MASS INDEX: 39.37 KG/M2 | WEIGHT: 245 LBS | OXYGEN SATURATION: 100 % | DIASTOLIC BLOOD PRESSURE: 70 MMHG | RESPIRATION RATE: 18 BRPM | HEIGHT: 66 IN | HEART RATE: 67 BPM

## 2021-04-21 DIAGNOSIS — M25.561 RIGHT KNEE PAIN, UNSPECIFIED CHRONICITY: Primary | ICD-10-CM

## 2021-04-21 PROCEDURE — 99203 OFFICE O/P NEW LOW 30 MIN: CPT | Performed by: ORTHOPAEDIC SURGERY

## 2021-04-21 NOTE — PROGRESS NOTES
Jenn Good is a 46 y.o. female   Primary provider:  Nehemiah Chow MD       Chief Complaint   Patient presents with   • Right Knee - Initial Evaluation       HISTORY OF PRESENT ILLNESS:  Patient in for initial eval of right knee pain.   She has not had a xray for this problem.   She had right knee surgery 24 years ago and for the last 10 years, it has been giving her more and more problems.     This is the first office visit for evaluation of right knee pain.    Ms. Good is 46 years old.  She said she had cartilage surgery on her right knee 24 years ago and did well after this.  However for the last 10 years or so she has had intermittent pain in the right knee.  The pain is fairly diffuse somewhat worse over the medial aspect of the knee.  She has had no popping but has noticed some swelling.  The pain is worse with walking and weather changes.  He also has multiple sclerosis and has had frequent muscle spasms in the right lower extremity which she says worsen her knee pain.  Pain is been relieved by hot bath as well as by analgesics.    Home medications include baclofen Voltaren Cymbalta Estrace Flonase Claritin melatonin Prilosec Imitrex myocarditis Zanaflex trazodone and 7.5 mg hydrocodone.  She says she does not take her pain medication every day.  She is allergic to lisinopril and Tamiflu.  She does not smoke.  Past medical history is remarkable for multiple sclerosis hypertension reflux and renal insufficiency.  Other diagnoses include depression and anxiety as well as migraines.  She is single and unemployed.    Dr. Chow has asked that I see her for evaluation and treatment.    Knee Pain   The pain is present in the right knee. The quality of the pain is described as aching. The pain is moderate. The pain has been intermittent since onset. Associated symptoms comments: swelling. She reports no foreign bodies present. Exacerbated by: standing, walking,driving.        CONCURRENT MEDICAL  HISTORY:    Past Medical History:   Diagnosis Date   • Abdominal pain, right upper quadrant    • Acid reflux    • Acute maxillary sinusitis    • Acute otitis media, left    • Acute pharyngitis    • Acute sinusitis    • Acute upper respiratory infection    • Allergic rhinitis    • Anxiety    • Anxiety state    • Backache    • Breast tenderness    • Bronchitis    • Callus    • Candidiasis of mouth    • Constipation    • Depression    • Dysuria    • Essential hypertension    • GERD (gastroesophageal reflux disease)    • High blood pressure    • Hypertension    • Impacted cerumen    • Ingrown toenail    • Insomnia    • Local infection of the skin and subcutaneous tissue, unspecified     R external ear      • Low back pain     with radiculopathy L buttock and posterior leg      • Malaise and fatigue    • Migraine with aura    • Migraines    • MS (multiple sclerosis) (CMS/HCC)    • Multiple sclerosis (CMS/HCC)    • Palpitations    • Tinea corporis    • Vaginal discharge    • Vulvovaginitis     yeast infection       Allergies   Allergen Reactions   • Adhesive Tape Itching and Dermatitis     Can not use IV tape, tegaderm--needs paper tape   • Lisinopril Cough   • Tamiflu [Oseltamivir Phosphate] Rash         Current Outpatient Medications:   •  baclofen (LIORESAL) 20 MG tablet, , Disp: , Rfl:   •  diclofenac (VOLTAREN) 75 MG EC tablet, Take 1 tablet by mouth 2 (Two) Times a Day., Disp: 60 tablet, Rfl: 5  •  dilTIAZem CD (Cartia XT) 240 MG 24 hr capsule, Take 1 capsule by mouth Daily., Disp: 30 capsule, Rfl: 5  •  docusate sodium (COLACE) 100 MG capsule, Take 1 capsule by mouth 2 (Two) Times a Day., Disp: 60 capsule, Rfl: 0  •  DULoxetine (CYMBALTA) 30 MG capsule, Take 30 mg by mouth Daily., Disp: , Rfl:   •  estradiol (ESTRACE) 1 MG tablet, Take 1 tablet by mouth Daily., Disp: 30 tablet, Rfl: 5  •  fluticasone (FLONASE) 50 MCG/ACT nasal spray, 2 sprays into the nostril(s) as directed by provider Daily., Disp: 16 g, Rfl: 0  •   HYDROcodone-acetaminophen (NORCO) 7.5-325 MG per tablet, Take 1 tablet by mouth Every 8 (Eight) Hours As Needed for Moderate Pain ., Disp: 90 tablet, Rfl: 0  •  loratadine (Claritin) 10 MG tablet, Take 1 tablet by mouth Daily., Disp: 90 tablet, Rfl: 1  •  melatonin 5 MG tablet tablet, Take 5 mg by mouth., Disp: , Rfl:   •  natalizumab (TYSABRI) 300 MG/15ML injection, Infuse  into a venous catheter., Disp: , Rfl:   •  omeprazole (priLOSEC) 40 MG capsule, Take 1 capsule by mouth Daily., Disp: 90 capsule, Rfl: 1  •  SUMAtriptan (IMITREX) 50 MG tablet, , Disp: , Rfl:   •  telmisartan (MICARDIS) 40 MG tablet, Take 40 mg by mouth Daily., Disp: , Rfl: 3  •  tiZANidine (ZANAFLEX) 4 MG tablet, Take 4 mg by mouth At Night As Needed for muscle spasms., Disp: , Rfl:   •  traZODone (DESYREL) 150 MG tablet, Take 1 tablet by mouth Every Night., Disp: 30 tablet, Rfl: 5  •  triamcinolone (KENALOG) 0.1 % ointment, Apply  topically to the appropriate area as directed 2 (Two) Times a Day., Disp: 80 g, Rfl: 3    Past Surgical History:   Procedure Laterality Date   •  SECTION     • HYSTERECTOMY     • INJECTION OF MEDICATION  2016    Celestone (betamethasone) (1)      • INJECTION OF MEDICATION  05/10/2016    Rocephin (1)      • INJECTION OF MEDICATION  2015    Toradol (1)      • INJECTION OF MEDICATION  2015    Zofran (1)      • TUBAL ABDOMINAL LIGATION      Examination under anesthesia and laparoscopic tubal.   • UPPER GASTROINTESTINAL ENDOSCOPY  2015    Hiatal hernia. Gastritis. Unspecified gastric ulcer. Performed at [facility].       Family History   Problem Relation Age of Onset   • Cancer Maternal Grandmother    • Cancer Maternal Grandfather    • Cancer Paternal Grandmother    • Cancer Paternal Grandfather    • Breast cancer Other        Social History     Socioeconomic History   • Marital status: Single     Spouse name: Not on file   • Number of children: Not on file   • Years of  "education: Not on file   • Highest education level: Not on file   Tobacco Use   • Smoking status: Never Smoker   • Smokeless tobacco: Never Used   Substance and Sexual Activity   • Alcohol use: Yes     Comment: pt states she drinks about 2 a week   • Drug use: No   • Sexual activity: Defer        Review of Systems   Musculoskeletal: Positive for joint swelling.   Neurological: Positive for headaches.   Psychiatric/Behavioral: Positive for sleep disturbance.        Itching  Anxiety/depression   All other systems reviewed and are negative.  Review of systems is positive as noted above.    PHYSICAL EXAMINATION:       Vitals:    04/21/21 1027   BP: 130/70   Pulse: 67   Resp: 18   Temp: 97.3 °F (36.3 °C)   SpO2: 100%   Weight: 111 kg (245 lb)   Height: 167.6 cm (66\")   PainSc:   5       Physical Exam she is alert pleasant and in no apparent distress at rest.  She responds appropriately to questions and commands.    GAIT:     [x]  Normal  []  Antalgic    Assistive device: [x]  None  []  Walker     []  Crutches  []  Cane     []  Wheelchair  []  Stretcher    Ortho Exam   is directed to the lower extremities.  She walks with a short stride which does not appear to be antalgic in nature.  She has 5 degrees or less of hyperextension of both knees and has further flexion on the right to 125 degrees.  Alignment of lower extremities is unremarkable.  There is no patellofemoral crepitus.  There is no detectable effusion of the knee.  Nevaeh testing produces no apparent pain and no crepitus.  Leg lengths are equal.  Dorsalis pedis pulses strong.  Sensory exam is intact to soft touch.  Lachman testing is 1+.    Radiographs of the right knee done in August 2020 were reviewed.  There is no significant joint space narrowing nor any significant degenerative change.        No results found.        ASSESSMENT: Right knee pain of uncertain cause.  She has limited clinical findings.  Her symptoms may be partly related to her neurologic " disease.    Treatment options were reviewed.  She was reassured I see no surgical indications at this point.  She was instructed in symptomatic care.  Potential benefits of injection therapy were discussed but she declined this.    If her symptoms worsen she will call for reevaluation.    Diagnoses and all orders for this visit:    Right knee pain, unspecified chronicity          PLAN  Patient's Body mass index is 39.54 kg/m². BMI is above normal parameters. Recommendations include: referral to primary care.    Return if symptoms worsen or fail to improve.    Shaka Becker MD

## 2021-04-23 DIAGNOSIS — G35 MULTIPLE SCLEROSIS (HCC): ICD-10-CM

## 2021-04-23 DIAGNOSIS — M25.561 CHRONIC PAIN OF RIGHT KNEE: ICD-10-CM

## 2021-04-23 DIAGNOSIS — G89.29 CHRONIC PAIN OF RIGHT KNEE: ICD-10-CM

## 2021-04-23 RX ORDER — HYDROCODONE BITARTRATE AND ACETAMINOPHEN 7.5; 325 MG/1; MG/1
1 TABLET ORAL EVERY 8 HOURS PRN
Qty: 90 TABLET | Refills: 0 | Status: SHIPPED | OUTPATIENT
Start: 2021-04-23 | End: 2021-05-24 | Stop reason: SDUPTHER

## 2021-04-28 ENCOUNTER — INFUSION (OUTPATIENT)
Dept: ONCOLOGY | Facility: HOSPITAL | Age: 46
End: 2021-04-28

## 2021-04-28 VITALS
SYSTOLIC BLOOD PRESSURE: 135 MMHG | DIASTOLIC BLOOD PRESSURE: 67 MMHG | TEMPERATURE: 96.9 F | RESPIRATION RATE: 18 BRPM | HEART RATE: 85 BPM

## 2021-04-28 DIAGNOSIS — G35 MULTIPLE SCLEROSIS (HCC): Primary | ICD-10-CM

## 2021-04-28 PROCEDURE — 96365 THER/PROPH/DIAG IV INF INIT: CPT | Performed by: NURSE PRACTITIONER

## 2021-04-28 PROCEDURE — 25010000002 NATALIZUMAB PER 1 MG: Performed by: NURSE PRACTITIONER

## 2021-04-28 RX ORDER — DIPHENHYDRAMINE HYDROCHLORIDE 50 MG/ML
25 INJECTION INTRAMUSCULAR; INTRAVENOUS AS NEEDED
Status: CANCELLED
Start: 2021-05-26

## 2021-04-28 RX ORDER — ONDANSETRON 2 MG/ML
4 INJECTION INTRAMUSCULAR; INTRAVENOUS AS NEEDED
Status: CANCELLED
Start: 2021-05-26

## 2021-04-28 RX ORDER — ONDANSETRON 4 MG/1
8 TABLET, ORALLY DISINTEGRATING ORAL ONCE
Status: CANCELLED
Start: 2021-05-26 | End: 2021-05-26

## 2021-04-28 RX ORDER — ACETAMINOPHEN 325 MG/1
650 TABLET ORAL ONCE
Status: DISCONTINUED | OUTPATIENT
Start: 2021-04-28 | End: 2021-04-28 | Stop reason: HOSPADM

## 2021-04-28 RX ORDER — ACETAMINOPHEN 325 MG/1
650 TABLET ORAL ONCE
Status: CANCELLED | OUTPATIENT
Start: 2021-05-26

## 2021-04-28 RX ORDER — SODIUM CHLORIDE 9 MG/ML
250 INJECTION, SOLUTION INTRAVENOUS ONCE
Status: CANCELLED | OUTPATIENT
Start: 2021-05-26

## 2021-04-28 RX ORDER — LORATADINE 10 MG/1
10 TABLET ORAL DAILY
Status: DISCONTINUED | OUTPATIENT
Start: 2021-04-28 | End: 2021-04-28 | Stop reason: HOSPADM

## 2021-04-28 RX ORDER — LORATADINE 10 MG/1
10 TABLET ORAL DAILY
Status: CANCELLED
Start: 2021-05-26

## 2021-04-28 RX ORDER — SODIUM CHLORIDE 9 MG/ML
250 INJECTION, SOLUTION INTRAVENOUS ONCE
Status: COMPLETED | OUTPATIENT
Start: 2021-04-28 | End: 2021-04-28

## 2021-04-28 RX ORDER — ONDANSETRON 4 MG/1
8 TABLET, ORALLY DISINTEGRATING ORAL ONCE
Status: DISCONTINUED | OUTPATIENT
Start: 2021-04-28 | End: 2021-04-28 | Stop reason: HOSPADM

## 2021-04-28 RX ADMIN — SODIUM CHLORIDE 250 ML: 9 INJECTION, SOLUTION INTRAVENOUS at 13:46

## 2021-04-28 RX ADMIN — NATALIZUMAB 300 MG: 300 INJECTION INTRAVENOUS at 13:46

## 2021-05-06 ENCOUNTER — LAB (OUTPATIENT)
Dept: LAB | Facility: HOSPITAL | Age: 46
End: 2021-05-06

## 2021-05-06 ENCOUNTER — OFFICE VISIT (OUTPATIENT)
Dept: FAMILY MEDICINE CLINIC | Facility: CLINIC | Age: 46
End: 2021-05-06

## 2021-05-06 VITALS
HEIGHT: 66 IN | DIASTOLIC BLOOD PRESSURE: 70 MMHG | TEMPERATURE: 96.9 F | BODY MASS INDEX: 40.02 KG/M2 | OXYGEN SATURATION: 98 % | WEIGHT: 249 LBS | HEART RATE: 73 BPM | SYSTOLIC BLOOD PRESSURE: 132 MMHG

## 2021-05-06 DIAGNOSIS — G89.29 CHRONIC PAIN OF RIGHT KNEE: Primary | ICD-10-CM

## 2021-05-06 DIAGNOSIS — M25.561 CHRONIC PAIN OF RIGHT KNEE: Primary | ICD-10-CM

## 2021-05-06 PROCEDURE — 99213 OFFICE O/P EST LOW 20 MIN: CPT | Performed by: STUDENT IN AN ORGANIZED HEALTH CARE EDUCATION/TRAINING PROGRAM

## 2021-05-06 PROCEDURE — 80306 DRUG TEST PRSMV INSTRMNT: CPT | Performed by: STUDENT IN AN ORGANIZED HEALTH CARE EDUCATION/TRAINING PROGRAM

## 2021-05-06 RX ORDER — DEXTROAMPHETAMINE SACCHARATE, AMPHETAMINE ASPARTATE, DEXTROAMPHETAMINE SULFATE AND AMPHETAMINE SULFATE 2.5; 2.5; 2.5; 2.5 MG/1; MG/1; MG/1; MG/1
10 TABLET ORAL
COMMUNITY
Start: 2021-05-04 | End: 2021-11-10

## 2021-05-06 RX ORDER — SODIUM BICARBONATE 650 MG/1
650 TABLET ORAL 3 TIMES DAILY
COMMUNITY
Start: 2021-04-04 | End: 2021-08-03

## 2021-05-06 RX ORDER — PREGABALIN 150 MG/1
150 CAPSULE ORAL 3 TIMES DAILY
COMMUNITY
Start: 2021-05-04 | End: 2021-11-01

## 2021-05-07 LAB
AMPHET+METHAMPHET UR QL: NEGATIVE
AMPHETAMINES UR QL: NEGATIVE
BARBITURATES UR QL SCN: NEGATIVE
BENZODIAZ UR QL SCN: NEGATIVE
BUPRENORPHINE SERPL-MCNC: NEGATIVE NG/ML
CANNABINOIDS SERPL QL: POSITIVE
COCAINE UR QL: NEGATIVE
METHADONE UR QL SCN: NEGATIVE
OPIATES UR QL: POSITIVE
OXYCODONE UR QL SCN: NEGATIVE
PCP UR QL SCN: NEGATIVE
PROPOXYPH UR QL: NEGATIVE
TRICYCLICS UR QL SCN: NEGATIVE

## 2021-05-24 ENCOUNTER — CONSULT (OUTPATIENT)
Dept: SLEEP MEDICINE | Facility: HOSPITAL | Age: 46
End: 2021-05-24

## 2021-05-24 VITALS
DIASTOLIC BLOOD PRESSURE: 79 MMHG | SYSTOLIC BLOOD PRESSURE: 140 MMHG | HEART RATE: 64 BPM | BODY MASS INDEX: 38.39 KG/M2 | WEIGHT: 238.9 LBS | OXYGEN SATURATION: 98 % | HEIGHT: 66 IN

## 2021-05-24 DIAGNOSIS — G89.29 CHRONIC PAIN OF RIGHT KNEE: ICD-10-CM

## 2021-05-24 DIAGNOSIS — M25.561 CHRONIC PAIN OF RIGHT KNEE: ICD-10-CM

## 2021-05-24 DIAGNOSIS — G47.10 HYPERSOMNIA: Primary | ICD-10-CM

## 2021-05-24 DIAGNOSIS — G35 MULTIPLE SCLEROSIS (HCC): ICD-10-CM

## 2021-05-24 DIAGNOSIS — R53.83 FATIGUE, UNSPECIFIED TYPE: ICD-10-CM

## 2021-05-24 PROCEDURE — 99203 OFFICE O/P NEW LOW 30 MIN: CPT | Performed by: INTERNAL MEDICINE

## 2021-05-24 NOTE — PROGRESS NOTES
"      [facility]  [address] [place] [address] Drive  [place], [place] [address]  Phone: [phone]  Fax: [phone]      New Patient Sleep Medicine Consultation  Sleep Visit Only    Encounter Date: 5/24/2021         Patient's PCP: Nehemiah Chow MD  Referring provider: Nehemiah Chow MD  Reason for consultation chief complaint: unrefreshing sleep    CHIEF COMPLAINT:  :   Chief Complaint   Patient presents with   • Fatigue     neck--- 14.5\"   • Leg/body Jerks During Sleep   • Heartburn   • Dry Mouth   • Frequent Awakenings   • Daytime Sleepiness   • Morning Headaches   • Snoring   • Nocturia        Encounter Date: 5/24/2021           HISTORY OF PRESENT ILLNESS:  Referred by Provider for Sleep Evaluation for KACIE in a Patient with Daytime fatigue and MS    Jenn Good is a 46 y.o. female whose bedtime is ~ 11 pm. She  falls asleep after 60 minutes minutes, and is up 2 times per night. She wakes up ~ 7 am. She endorses 3-4 hours of sleep. She drinks no cups of coffee, 1 teas, and No sodas per day. She drinks no alcoholic beverages per week.  Patient has been referred previously for Sleep evaluation but she never went or follow-up  Patient has a history of MS for about 10 years and she is followed by neurologist, patient is on monthly infusion of natalizumab.  Patient is on multiple medication including Adderall and Norco as per PCP.    Jenn Good admits to snoring, unrestful sleep, High blod pressure, excessive daytime sleepiness, morning headaches, sleeping less than 6 hours per night, Disturbed or restless sleep, memory loss, Up to the bathroom at night, night sweats, restless legs at night, difficulty falling asleep, difficulty staying asleep and takes medicine to help go to sleep. She denies cataplexy, sleep paralysis, or hypnagogic hallucinations. She does not take sedatives or hypnotics. She has No sleepiness with driving. She does not nap.  She denied Turning and Tossing.    Patient " denies history of KACIE    Brief Social History:  She is a never smoker.  Nicotine vaping: No  No POT smoker: used to     Occupation:  Disable    PAST MEDICAL AND SURGICAL HISTORIES:    Past Medical History:   Diagnosis Date   • Abdominal pain, right upper quadrant    • Acid reflux    • Acute maxillary sinusitis    • Acute otitis media, left    • Acute pharyngitis    • Acute sinusitis    • Acute upper respiratory infection    • Allergic rhinitis    • Anxiety    • Anxiety state    • Backache    • Breast tenderness    • Bronchitis    • Callus    • Candidiasis of mouth    • Constipation    • Depression    • Dysuria    • Essential hypertension    • GERD (gastroesophageal reflux disease)    • High blood pressure    • Hypertension    • Impacted cerumen    • Ingrown toenail    • Insomnia    • Local infection of the skin and subcutaneous tissue, unspecified     R external ear      • Low back pain     with radiculopathy L buttock and posterior leg      • Malaise and fatigue    • Migraine with aura    • Migraines    • MS (multiple sclerosis) (CMS/HCC)    • Multiple sclerosis (CMS/HCC)    • Palpitations    • Tinea corporis    • Vaginal discharge    • Vulvovaginitis     yeast infection     Past Surgical History:   Procedure Laterality Date   •  SECTION     • HYSTERECTOMY     • INJECTION OF MEDICATION  2016    Celestone (betamethasone) (1)      • INJECTION OF MEDICATION  05/10/2016    Rocephin (1)      • INJECTION OF MEDICATION  2015    Toradol (1)      • INJECTION OF MEDICATION  2015    Zofran (1)      • TUBAL ABDOMINAL LIGATION  2000    Examination under anesthesia and laparoscopic tubal.   • UPPER GASTROINTESTINAL ENDOSCOPY  2015    Hiatal hernia. Gastritis. Unspecified gastric ulcer. Performed at [facility].       Allergies   Allergen Reactions   • Adhesive Tape Itching and Dermatitis     Can not use IV tape, tegaderm--needs paper tape   • Lisinopril Cough   • Tamiflu [Oseltamivir  Phosphate] Rash         Family History   Problem Relation Age of Onset   • Cancer Maternal Grandmother    • Cancer Maternal Grandfather    • Cancer Paternal Grandmother    • Cancer Paternal Grandfather    • Breast cancer Other      Social History     Socioeconomic History   • Marital status: Single     Spouse name: Not on file   • Number of children: Not on file   • Years of education: Not on file   • Highest education level: Not on file   Tobacco Use   • Smoking status: Never Smoker   • Smokeless tobacco: Never Used   Substance and Sexual Activity   • Alcohol use: Yes     Comment: pt states she drinks about 2 a week   • Drug use: No   • Sexual activity: Defer       MEDICATIONS:    Current Outpatient Medications:   •  amphetamine-dextroamphetamine (ADDERALL) 10 MG tablet, Take 10 mg by mouth., Disp: , Rfl:   •  baclofen (LIORESAL) 20 MG tablet, , Disp: , Rfl:   •  diclofenac (VOLTAREN) 75 MG EC tablet, Take 1 tablet by mouth 2 (Two) Times a Day., Disp: 60 tablet, Rfl: 5  •  dilTIAZem CD (Cartia XT) 240 MG 24 hr capsule, Take 1 capsule by mouth Daily., Disp: 30 capsule, Rfl: 5  •  docusate sodium (COLACE) 100 MG capsule, Take 1 capsule by mouth 2 (Two) Times a Day., Disp: 60 capsule, Rfl: 0  •  DULoxetine (CYMBALTA) 30 MG capsule, Take 30 mg by mouth Daily., Disp: , Rfl:   •  estradiol (ESTRACE) 1 MG tablet, Take 1 tablet by mouth Daily., Disp: 30 tablet, Rfl: 5  •  fluticasone (FLONASE) 50 MCG/ACT nasal spray, 2 sprays into the nostril(s) as directed by provider Daily., Disp: 16 g, Rfl: 0  •  HYDROcodone-acetaminophen (NORCO) 7.5-325 MG per tablet, Take 1 tablet by mouth Every 8 (Eight) Hours As Needed for Moderate Pain ., Disp: 90 tablet, Rfl: 0  •  loratadine (Claritin) 10 MG tablet, Take 1 tablet by mouth Daily., Disp: 90 tablet, Rfl: 1  •  melatonin 5 MG tablet tablet, Take 5 mg by mouth., Disp: , Rfl:   •  natalizumab (TYSABRI) 300 MG/15ML injection, Infuse  into a venous catheter., Disp: , Rfl:   •  omeprazole  "(priLOSEC) 40 MG capsule, Take 1 capsule by mouth Daily., Disp: 90 capsule, Rfl: 1  •  pregabalin (Lyrica) 150 MG capsule, Take 150 mg by mouth 3 (Three) Times a Day., Disp: , Rfl:   •  sodium bicarbonate 650 MG tablet, Take 650 mg by mouth 3 (Three) Times a Day., Disp: , Rfl:   •  telmisartan (MICARDIS) 40 MG tablet, Take 40 mg by mouth Daily., Disp: , Rfl: 3  •  tiZANidine (ZANAFLEX) 4 MG tablet, Take 4 mg by mouth At Night As Needed for muscle spasms., Disp: , Rfl:   •  traZODone (DESYREL) 150 MG tablet, Take 1 tablet by mouth Every Night., Disp: 30 tablet, Rfl: 5      Review of Systems   Constitutional: Positive for fatigue. Negative for fever.   HENT: Negative for congestion.    Eyes: Negative for visual disturbance.   Respiratory: Negative for shortness of breath and stridor.    Cardiovascular: Negative for chest pain.   Gastrointestinal: Negative for blood in stool.   Endocrine: Negative for heat intolerance.   Musculoskeletal: Positive for arthralgias and myalgias.   Skin: Negative for pallor.   Allergic/Immunologic: Negative for environmental allergies and food allergies.   Neurological: Positive for numbness and headaches. Negative for seizures.   Hematological: Does not bruise/bleed easily.   Psychiatric/Behavioral: Positive for sleep disturbance.        Depression     OBJECTIVE:    Vitals:    05/24/21 1018   BP: 140/79   Pulse: 64   SpO2: 98%         05/24/21  1018   Weight: 108 kg (238 lb 14.4 oz)     Body mass index is 38.58 kg/m². Patient's Body mass index is 38.58 kg/m².   Neck circumference: 14.5\"    PHYSICAL EXAM:          General: Alert. Cooperative. No acute distress.             Head:  Normocephalic. Symmetrical. Atraumatic.              Eyes: Sclera clear. No icterus.              Nose: No septal deviation. No drainage.             Neck:  Trachea midline, No Stridor, No Supraclavicular Adenopathy          Throat: No oral lesions. No thrush. Moist mucous membranes.    Tongue is " normal    Dentition is good       Pharynx: Posterior pharyngeal pillars are narrow    Mallampati score of III (soft and hard palate and base of uvula visible)    Pharynx is nonerythematous                     Lungs:  Clear to auscultation bilaterally. No wheezes. No rhonchi. No rales. Respirations regular, even and unlabored.            Heart:  Regular rhythm and normal rate. Normal S1 and S2. No murmur.     Abdomen:  Soft, non-tender and non-distended. Normal bowel sounds.  Extremities:  No edema, no Clubbing, No Cyanosis, upper extremity pulses palpable              Skin: No jaundice or rash, no nasal bridge ulceration           Neuro: Moves all 4 extremities, adequate handgrip and strength  Psychiatric: Normal mood and affect.        RESULTS:  Recent urine toxicology done on 5/6/2021 was positive for opiates and THC      IMPRESSION AND RECOMMENDATIONS:  Pleasant 46-year-old lady referred for a sleep evaluation due to daytime fatigue and hypersomnia, in a patient with a longstanding history of multiple sclerosis, differential diagnosis is vast but due to the fatigue could be related to MS and sleep apnea as well,   Due to increased incidence of central sleep apnea and REM behavior disorders and PLMS inpatient with MS, we will request in lab diagnostic polysomnogram.    1. Hypersomnia, suspected KACIE  We will request in lab diagnostic polysomnogram  Discussion regarding the sleep apnea was done with patient and she was explained the risks's increase in setting of a sleep disturbance a patient with MS but as well fatigue can be explained by MS itself and lack of exercise with deconditioning  - Polysomnography 4 or More Parameters; Future    2. Fatigue, unspecified type  We will rule out sleep apnea      Contraindications to home sleep test: Neuromuscular impairment such as Parkinson's disease or ALS and Suspicion of central sleep apnea    FOLLOW UP:  Return in about 3 weeks (around 6/14/2021).      Jair MCFARLAND  MD Cristino, FACP, FCCP  Diplomate in Pulmonary & Sleep Medicine    This document has been electronically signed by Jair Gergg MD on May 24, 2021 11:00 CDT      Instructions provided as documented in the After Visit Summary.  The patient indicated understanding of the diagnosis and agreed with the plan of care    EMR Dragon/Transcription disclaimer:   Some of this note may be an electronic transcription/translation of spoken language to printed text. The electronic translation of spoken language may permit erroneous, or at times, nonsensical words or phrases to be inadvertently transcribed; Although I have reviewed the note for such errors, some may still exist.      CC: Nehemiah Chow MD Moody, Leo James, MD

## 2021-05-25 RX ORDER — HYDROCODONE BITARTRATE AND ACETAMINOPHEN 7.5; 325 MG/1; MG/1
1 TABLET ORAL EVERY 8 HOURS PRN
Qty: 90 TABLET | Refills: 0 | Status: SHIPPED | OUTPATIENT
Start: 2021-05-25 | End: 2021-06-25 | Stop reason: SDUPTHER

## 2021-05-26 ENCOUNTER — INFUSION (OUTPATIENT)
Dept: ONCOLOGY | Facility: HOSPITAL | Age: 46
End: 2021-05-26

## 2021-05-26 VITALS
TEMPERATURE: 97.8 F | RESPIRATION RATE: 18 BRPM | HEART RATE: 73 BPM | DIASTOLIC BLOOD PRESSURE: 63 MMHG | SYSTOLIC BLOOD PRESSURE: 142 MMHG

## 2021-05-26 DIAGNOSIS — G35 MULTIPLE SCLEROSIS (HCC): Primary | ICD-10-CM

## 2021-05-26 PROCEDURE — 96365 THER/PROPH/DIAG IV INF INIT: CPT | Performed by: NURSE PRACTITIONER

## 2021-05-26 PROCEDURE — 25010000002 NATALIZUMAB PER 1 MG: Performed by: NURSE PRACTITIONER

## 2021-05-26 RX ORDER — ONDANSETRON 4 MG/1
8 TABLET, ORALLY DISINTEGRATING ORAL ONCE
Status: DISCONTINUED | OUTPATIENT
Start: 2021-05-26 | End: 2021-05-26 | Stop reason: HOSPADM

## 2021-05-26 RX ORDER — LORATADINE 10 MG/1
10 TABLET ORAL DAILY
Status: DISCONTINUED | OUTPATIENT
Start: 2021-05-26 | End: 2021-05-26 | Stop reason: HOSPADM

## 2021-05-26 RX ORDER — SODIUM CHLORIDE 9 MG/ML
250 INJECTION, SOLUTION INTRAVENOUS ONCE
Status: CANCELLED | OUTPATIENT
Start: 2021-06-23

## 2021-05-26 RX ORDER — ONDANSETRON 4 MG/1
8 TABLET, ORALLY DISINTEGRATING ORAL ONCE
Status: CANCELLED
Start: 2021-06-23 | End: 2021-06-23

## 2021-05-26 RX ORDER — ACETAMINOPHEN 325 MG/1
650 TABLET ORAL ONCE
Status: CANCELLED | OUTPATIENT
Start: 2021-06-23

## 2021-05-26 RX ORDER — ONDANSETRON 2 MG/ML
4 INJECTION INTRAMUSCULAR; INTRAVENOUS AS NEEDED
Status: CANCELLED
Start: 2021-06-23

## 2021-05-26 RX ORDER — SODIUM CHLORIDE 9 MG/ML
250 INJECTION, SOLUTION INTRAVENOUS ONCE
Status: COMPLETED | OUTPATIENT
Start: 2021-05-26 | End: 2021-05-26

## 2021-05-26 RX ORDER — ACETAMINOPHEN 325 MG/1
650 TABLET ORAL ONCE
Status: DISCONTINUED | OUTPATIENT
Start: 2021-05-26 | End: 2021-05-26 | Stop reason: HOSPADM

## 2021-05-26 RX ORDER — DIPHENHYDRAMINE HYDROCHLORIDE 50 MG/ML
25 INJECTION INTRAMUSCULAR; INTRAVENOUS AS NEEDED
Status: CANCELLED
Start: 2021-06-23

## 2021-05-26 RX ORDER — LORATADINE 10 MG/1
10 TABLET ORAL DAILY
Status: CANCELLED
Start: 2021-06-23

## 2021-05-26 RX ADMIN — NATALIZUMAB 300 MG: 300 INJECTION INTRAVENOUS at 14:01

## 2021-05-26 RX ADMIN — SODIUM CHLORIDE 250 ML: 9 INJECTION, SOLUTION INTRAVENOUS at 14:01

## 2021-06-13 ENCOUNTER — HOSPITAL ENCOUNTER (OUTPATIENT)
Dept: SLEEP MEDICINE | Facility: HOSPITAL | Age: 46
Discharge: HOME | End: 2021-06-13
Admitting: INTERNAL MEDICINE

## 2021-06-13 DIAGNOSIS — G47.10 HYPERSOMNIA: ICD-10-CM

## 2021-06-13 PROCEDURE — 95810 POLYSOM 6/> YRS 4/> PARAM: CPT

## 2021-06-13 PROCEDURE — 95810 POLYSOM 6/> YRS 4/> PARAM: CPT | Performed by: INTERNAL MEDICINE

## 2021-06-23 ENCOUNTER — INFUSION (OUTPATIENT)
Dept: ONCOLOGY | Facility: HOSPITAL | Age: 46
End: 2021-06-23

## 2021-06-23 VITALS
RESPIRATION RATE: 18 BRPM | HEART RATE: 72 BPM | SYSTOLIC BLOOD PRESSURE: 143 MMHG | OXYGEN SATURATION: 100 % | DIASTOLIC BLOOD PRESSURE: 68 MMHG | TEMPERATURE: 96.7 F

## 2021-06-23 DIAGNOSIS — G35 MULTIPLE SCLEROSIS (HCC): Primary | ICD-10-CM

## 2021-06-23 PROCEDURE — 96365 THER/PROPH/DIAG IV INF INIT: CPT | Performed by: INTERNAL MEDICINE

## 2021-06-23 PROCEDURE — 25010000002 NATALIZUMAB PER 1 MG: Performed by: NURSE PRACTITIONER

## 2021-06-23 RX ORDER — LORATADINE 10 MG/1
10 TABLET ORAL DAILY
Status: CANCELLED
Start: 2021-07-01

## 2021-06-23 RX ORDER — SODIUM CHLORIDE 9 MG/ML
250 INJECTION, SOLUTION INTRAVENOUS ONCE
Status: COMPLETED | OUTPATIENT
Start: 2021-06-23 | End: 2021-06-23

## 2021-06-23 RX ORDER — SODIUM CHLORIDE 9 MG/ML
250 INJECTION, SOLUTION INTRAVENOUS ONCE
Status: CANCELLED | OUTPATIENT
Start: 2021-07-01

## 2021-06-23 RX ORDER — LORATADINE 10 MG/1
10 TABLET ORAL DAILY
Status: DISCONTINUED | OUTPATIENT
Start: 2021-06-23 | End: 2021-06-23 | Stop reason: HOSPADM

## 2021-06-23 RX ORDER — ACETAMINOPHEN 325 MG/1
650 TABLET ORAL ONCE
Status: DISCONTINUED | OUTPATIENT
Start: 2021-06-23 | End: 2021-06-23 | Stop reason: HOSPADM

## 2021-06-23 RX ORDER — ACETAMINOPHEN 325 MG/1
650 TABLET ORAL ONCE
Status: CANCELLED | OUTPATIENT
Start: 2021-07-01

## 2021-06-23 RX ORDER — ONDANSETRON 4 MG/1
8 TABLET, ORALLY DISINTEGRATING ORAL ONCE
Status: CANCELLED
Start: 2021-07-01 | End: 2021-07-01

## 2021-06-23 RX ORDER — DIPHENHYDRAMINE HYDROCHLORIDE 50 MG/ML
25 INJECTION INTRAMUSCULAR; INTRAVENOUS AS NEEDED
Status: CANCELLED
Start: 2021-07-01

## 2021-06-23 RX ORDER — ONDANSETRON 4 MG/1
8 TABLET, ORALLY DISINTEGRATING ORAL ONCE
Status: DISCONTINUED | OUTPATIENT
Start: 2021-06-23 | End: 2021-06-23 | Stop reason: HOSPADM

## 2021-06-23 RX ORDER — ONDANSETRON 2 MG/ML
4 INJECTION INTRAMUSCULAR; INTRAVENOUS AS NEEDED
Status: CANCELLED
Start: 2021-07-01

## 2021-06-23 RX ADMIN — SODIUM CHLORIDE 250 ML: 9 INJECTION, SOLUTION INTRAVENOUS at 13:38

## 2021-06-23 RX ADMIN — NATALIZUMAB 300 MG: 300 INJECTION INTRAVENOUS at 13:52

## 2021-06-25 DIAGNOSIS — M25.561 CHRONIC PAIN OF RIGHT KNEE: ICD-10-CM

## 2021-06-25 DIAGNOSIS — G89.29 CHRONIC PAIN OF RIGHT KNEE: ICD-10-CM

## 2021-06-25 DIAGNOSIS — G35 MULTIPLE SCLEROSIS (HCC): ICD-10-CM

## 2021-06-25 RX ORDER — HYDROCODONE BITARTRATE AND ACETAMINOPHEN 7.5; 325 MG/1; MG/1
1 TABLET ORAL EVERY 8 HOURS PRN
Qty: 90 TABLET | Refills: 0 | Status: SHIPPED | OUTPATIENT
Start: 2021-06-25 | End: 2021-07-26 | Stop reason: SDUPTHER

## 2021-06-28 ENCOUNTER — OFFICE VISIT (OUTPATIENT)
Dept: SLEEP MEDICINE | Facility: HOSPITAL | Age: 46
End: 2021-06-28

## 2021-06-28 VITALS
HEIGHT: 66 IN | HEART RATE: 80 BPM | OXYGEN SATURATION: 98 % | BODY MASS INDEX: 38.94 KG/M2 | SYSTOLIC BLOOD PRESSURE: 148 MMHG | WEIGHT: 242.3 LBS | DIASTOLIC BLOOD PRESSURE: 96 MMHG

## 2021-06-28 DIAGNOSIS — R53.83 FATIGUE, UNSPECIFIED TYPE: Primary | ICD-10-CM

## 2021-06-28 PROCEDURE — 99212 OFFICE O/P EST SF 10 MIN: CPT | Performed by: NURSE PRACTITIONER

## 2021-06-28 NOTE — PROGRESS NOTES
Sleep Clinic Follow-Up    CHIEF COMPLAINT: follow up sleep testing    LAST OV: 05/24/2021 (I reviewed this note)    HPI:  The patient is a 46 y.o. female.  I discussed the results of the recent diagnostic polysomnography performed on 06/13/2021.  The AHI/UNIQUE was 0.6. The patient had a periodic limb movement index of no significance.  The patient did not have any significant cardiac arrhythmias. Mean SaO2 was 95% with a dameon of 92%. Snoring was audible. No REM sleep on night of the study. She is on several REM suppressing medications.       INTERVAL MEDICAL HISTORY: No change since last office visit in regard to the patient's bedtime routine, medications, or diagnosis.      MEDICATIONS:   Current Outpatient Medications:   •  amphetamine-dextroamphetamine (ADDERALL) 10 MG tablet, Take 10 mg by mouth., Disp: , Rfl:   •  baclofen (LIORESAL) 20 MG tablet, , Disp: , Rfl:   •  diclofenac (VOLTAREN) 75 MG EC tablet, Take 1 tablet by mouth 2 (Two) Times a Day., Disp: 60 tablet, Rfl: 5  •  dilTIAZem CD (Cartia XT) 240 MG 24 hr capsule, Take 1 capsule by mouth Daily., Disp: 30 capsule, Rfl: 5  •  docusate sodium (COLACE) 100 MG capsule, Take 1 capsule by mouth 2 (Two) Times a Day., Disp: 60 capsule, Rfl: 0  •  DULoxetine (CYMBALTA) 30 MG capsule, Take 30 mg by mouth Daily., Disp: , Rfl:   •  estradiol (ESTRACE) 1 MG tablet, Take 1 tablet by mouth Daily., Disp: 30 tablet, Rfl: 5  •  fluticasone (FLONASE) 50 MCG/ACT nasal spray, 2 sprays into the nostril(s) as directed by provider Daily., Disp: 16 g, Rfl: 0  •  HYDROcodone-acetaminophen (NORCO) 7.5-325 MG per tablet, Take 1 tablet by mouth Every 8 (Eight) Hours As Needed for Moderate Pain ., Disp: 90 tablet, Rfl: 0  •  loratadine (Claritin) 10 MG tablet, Take 1 tablet by mouth Daily., Disp: 90 tablet, Rfl: 1  •  melatonin 5 MG tablet tablet, Take 5 mg by mouth., Disp: , Rfl:   •  natalizumab (TYSABRI) 300 MG/15ML injection, Infuse  into a venous catheter., Disp: , Rfl:   •   omeprazole (priLOSEC) 40 MG capsule, Take 1 capsule by mouth Daily., Disp: 90 capsule, Rfl: 1  •  pregabalin (Lyrica) 150 MG capsule, Take 150 mg by mouth 3 (Three) Times a Day., Disp: , Rfl:   •  sodium bicarbonate 650 MG tablet, Take 650 mg by mouth 3 (Three) Times a Day., Disp: , Rfl:   •  telmisartan (MICARDIS) 40 MG tablet, Take 40 mg by mouth Daily., Disp: , Rfl: 3  •  tiZANidine (ZANAFLEX) 4 MG tablet, Take 4 mg by mouth At Night As Needed for muscle spasms., Disp: , Rfl:   •  traZODone (DESYREL) 150 MG tablet, Take 1 tablet by mouth Every Night., Disp: 30 tablet, Rfl: 5    PHYSICAL EXAM  Vitals:    06/28/21 1020   BP: 148/96   Pulse: 80   SpO2: 98%           06/28/21  1020   Weight: 110 kg (242 lb 4.8 oz)       Body mass index is 39.13 kg/m². Patient's Body mass index is 39.13 kg/m². indicating that she is obese (BMI >30). Obesity-related health conditions include the following: hypertension. Obesity is unchanged. BMI is is above average; BMI management plan is completed. We discussed portion control and increasing exercise..      Gen:  No acute distress heard in voice, alert, oriented  Eyes:    Moist conjunctiva, EOMI   Lungs:  Normal effort, non-labored breathing, clear to auscultation bilaterally   Heart:  Normal S1/S2, no murmur , no lower extremity edema   Psych:  Appropriate affect   Neuro:  Cn2-12 appear intact     Assessment and Plan:    1. Fatigue  - Established, stable (1)  1. Sleep study non-diagnostic of sleep disordered breathing   2. Discussed other factors that could be contributing to fatigue- she has history of MS   3. She is released from Sleep Center at this time, follow-up on as needed basis       The sleep results were discussed in detail with the patient.  I counseled patient on sleep hygiene, including regular sleep wake schedule and stimulus control therapy, the importance of weight reduction, safe driving and abstaining from smoking and alcohol consumption, as well as other  sedatives.    All of the patient's questions were answered. She states understanding and agreement with my assessment and plan as above.     I obtained a brief history from the patient, reviewed the medical problems and current medications, and made medical decisions regarding treatment based on that information. Total visit time 15 min, with total of 12 minutes spent counseling patient regarding: Weight loss, Sleep hygiene and Study results.       She agrees to return sooner on a prn basis if sx change.           This document has been electronically signed by JASON Richards on June 28, 2021 10:23 CDT            CC: Nehemiah Chow MD          No ref. provider found

## 2021-07-21 ENCOUNTER — INFUSION (OUTPATIENT)
Dept: ONCOLOGY | Facility: HOSPITAL | Age: 46
End: 2021-07-21

## 2021-07-21 VITALS
RESPIRATION RATE: 18 BRPM | DIASTOLIC BLOOD PRESSURE: 79 MMHG | TEMPERATURE: 97.8 F | HEART RATE: 85 BPM | SYSTOLIC BLOOD PRESSURE: 140 MMHG

## 2021-07-21 DIAGNOSIS — G35 MULTIPLE SCLEROSIS (HCC): Primary | ICD-10-CM

## 2021-07-21 PROCEDURE — 80076 HEPATIC FUNCTION PANEL: CPT | Performed by: NURSE PRACTITIONER

## 2021-07-21 PROCEDURE — 96365 THER/PROPH/DIAG IV INF INIT: CPT | Performed by: NURSE PRACTITIONER

## 2021-07-21 PROCEDURE — 25010000002 NATALIZUMAB PER 1 MG: Performed by: NURSE PRACTITIONER

## 2021-07-21 RX ORDER — ACETAMINOPHEN 325 MG/1
650 TABLET ORAL ONCE
Status: CANCELLED | OUTPATIENT
Start: 2021-08-18

## 2021-07-21 RX ORDER — LORATADINE 10 MG/1
10 TABLET ORAL DAILY
Status: DISCONTINUED | OUTPATIENT
Start: 2021-07-21 | End: 2021-07-21 | Stop reason: HOSPADM

## 2021-07-21 RX ORDER — SODIUM CHLORIDE 9 MG/ML
250 INJECTION, SOLUTION INTRAVENOUS ONCE
Status: COMPLETED | OUTPATIENT
Start: 2021-07-21 | End: 2021-07-21

## 2021-07-21 RX ORDER — ONDANSETRON 2 MG/ML
4 INJECTION INTRAMUSCULAR; INTRAVENOUS AS NEEDED
Status: CANCELLED
Start: 2021-08-18

## 2021-07-21 RX ORDER — ONDANSETRON 4 MG/1
8 TABLET, ORALLY DISINTEGRATING ORAL ONCE
Status: CANCELLED
Start: 2021-08-18 | End: 2021-08-18

## 2021-07-21 RX ORDER — SODIUM CHLORIDE 9 MG/ML
250 INJECTION, SOLUTION INTRAVENOUS ONCE
Status: CANCELLED | OUTPATIENT
Start: 2021-08-18

## 2021-07-21 RX ORDER — ONDANSETRON 4 MG/1
8 TABLET, ORALLY DISINTEGRATING ORAL ONCE
Status: DISCONTINUED | OUTPATIENT
Start: 2021-07-21 | End: 2021-07-21 | Stop reason: HOSPADM

## 2021-07-21 RX ORDER — DIPHENHYDRAMINE HYDROCHLORIDE 50 MG/ML
25 INJECTION INTRAMUSCULAR; INTRAVENOUS AS NEEDED
Status: CANCELLED
Start: 2021-08-18

## 2021-07-21 RX ORDER — LORATADINE 10 MG/1
10 TABLET ORAL DAILY
Status: CANCELLED
Start: 2021-08-18

## 2021-07-21 RX ORDER — ACETAMINOPHEN 325 MG/1
650 TABLET ORAL ONCE
Status: DISCONTINUED | OUTPATIENT
Start: 2021-07-21 | End: 2021-07-21 | Stop reason: HOSPADM

## 2021-07-21 RX ADMIN — NATALIZUMAB 300 MG: 300 INJECTION INTRAVENOUS at 13:51

## 2021-07-21 RX ADMIN — SODIUM CHLORIDE 250 ML: 9 INJECTION, SOLUTION INTRAVENOUS at 13:50

## 2021-07-26 DIAGNOSIS — M25.561 CHRONIC PAIN OF RIGHT KNEE: ICD-10-CM

## 2021-07-26 DIAGNOSIS — G89.29 CHRONIC PAIN OF RIGHT KNEE: ICD-10-CM

## 2021-07-26 DIAGNOSIS — G35 MULTIPLE SCLEROSIS (HCC): ICD-10-CM

## 2021-07-27 RX ORDER — HYDROCODONE BITARTRATE AND ACETAMINOPHEN 7.5; 325 MG/1; MG/1
1 TABLET ORAL EVERY 8 HOURS PRN
Qty: 90 TABLET | Refills: 0 | Status: SHIPPED | OUTPATIENT
Start: 2021-07-27 | End: 2021-08-27 | Stop reason: SDUPTHER

## 2021-08-03 ENCOUNTER — OFFICE VISIT (OUTPATIENT)
Dept: FAMILY MEDICINE CLINIC | Facility: CLINIC | Age: 46
End: 2021-08-03

## 2021-08-03 VITALS
HEART RATE: 77 BPM | TEMPERATURE: 96.6 F | HEIGHT: 66 IN | RESPIRATION RATE: 20 BRPM | SYSTOLIC BLOOD PRESSURE: 100 MMHG | DIASTOLIC BLOOD PRESSURE: 80 MMHG | WEIGHT: 238.5 LBS | OXYGEN SATURATION: 99 % | BODY MASS INDEX: 38.33 KG/M2

## 2021-08-03 DIAGNOSIS — G35 MULTIPLE SCLEROSIS EXACERBATION (HCC): Primary | ICD-10-CM

## 2021-08-03 PROCEDURE — 99215 OFFICE O/P EST HI 40 MIN: CPT | Performed by: STUDENT IN AN ORGANIZED HEALTH CARE EDUCATION/TRAINING PROGRAM

## 2021-08-03 RX ORDER — PREDNISONE 50 MG/1
1000 TABLET ORAL DAILY
Qty: 100 TABLET | Refills: 0 | Status: CANCELLED | OUTPATIENT
Start: 2021-08-03

## 2021-08-03 RX ORDER — CLONAZEPAM 0.5 MG/1
0.5 TABLET ORAL DAILY PRN
Qty: 5 TABLET | Refills: 0 | Status: CANCELLED | OUTPATIENT
Start: 2021-08-03

## 2021-08-03 NOTE — PROGRESS NOTES
"Subjective:  Jenn Good is a 46 y.o. female who presents for     Balance issues; states started last week, last week had some nausea, vomiting and diarrhea. Attributes it to a GI virus, states that has since resolved. Still having vision issues, particularly blurry vision. Has not had a MS flare in years, currently on telmisartan. No weakness, numbness, tingling. No issues with bowel movements, did have enuresis a couple of nights ago. That has only happened when she had a flare. Does admit to increased numbness and tingling in her thighs.  Does admit to having several falls over the last week, denied injury.        Patient Active Problem List   Diagnosis   • Rash   • Gastroesophageal reflux disease   • Primary insomnia   • Hormone replacement therapy   • Hypertension   • Low back pain with right-sided sciatica   • Candida, oral   • Candida vaginitis   • Nausea and vomiting   • Acute URI   • Multiple sclerosis (CMS/HCC)   • Impacted cerumen of right ear   • Chronic low back pain   • Acute sinusitis   • Sinus headache   • Vitamin D deficiency   • Neuropathic pain   • Depression   • Hypersomnia, suspected KACIE   • Fatigue     Vitals:    Vitals:    08/03/21 0826   BP: 100/80   BP Location: Right arm   Patient Position: Sitting   Cuff Size: Adult   Pulse: 77   Resp: 20   Temp: 96.6 °F (35.9 °C)   TempSrc: Temporal   SpO2: 99%   Weight: 108 kg (238 lb 8 oz)   Height: 167.6 cm (66\")     Body mass index is 38.49 kg/m².      Current Outpatient Medications:   •  amphetamine-dextroamphetamine (ADDERALL) 10 MG tablet, Take 10 mg by mouth., Disp: , Rfl:   •  baclofen (LIORESAL) 20 MG tablet, , Disp: , Rfl:   •  diclofenac (VOLTAREN) 75 MG EC tablet, Take 1 tablet by mouth 2 (Two) Times a Day., Disp: 60 tablet, Rfl: 5  •  dilTIAZem CD (Cartia XT) 240 MG 24 hr capsule, Take 1 capsule by mouth Daily., Disp: 30 capsule, Rfl: 5  •  DULoxetine (CYMBALTA) 30 MG capsule, Take 30 mg by mouth Daily., Disp: , Rfl:   •  estradiol " (ESTRACE) 1 MG tablet, Take 1 tablet by mouth Daily., Disp: 30 tablet, Rfl: 5  •  fluticasone (FLONASE) 50 MCG/ACT nasal spray, 2 sprays into the nostril(s) as directed by provider Daily., Disp: 16 g, Rfl: 0  •  loratadine (Claritin) 10 MG tablet, Take 1 tablet by mouth Daily., Disp: 90 tablet, Rfl: 1  •  melatonin 5 MG tablet tablet, Take 5 mg by mouth., Disp: , Rfl:   •  natalizumab (TYSABRI) 300 MG/15ML injection, Infuse  into a venous catheter., Disp: , Rfl:   •  omeprazole (priLOSEC) 40 MG capsule, Take 1 capsule by mouth Daily., Disp: 90 capsule, Rfl: 1  •  pregabalin (Lyrica) 150 MG capsule, Take 150 mg by mouth 3 (Three) Times a Day., Disp: , Rfl:   •  telmisartan (MICARDIS) 40 MG tablet, Take 40 mg by mouth Daily., Disp: , Rfl: 3  •  tiZANidine (ZANAFLEX) 4 MG tablet, Take 4 mg by mouth At Night As Needed for muscle spasms., Disp: , Rfl:   •  HYDROcodone-acetaminophen (NORCO) 7.5-325 MG per tablet, Take 1 tablet by mouth Every 8 (Eight) Hours As Needed for Moderate Pain ., Disp: 90 tablet, Rfl: 0  •  sodium bicarbonate 650 MG tablet, Take 650 mg by mouth 3 (Three) Times a Day., Disp: , Rfl:   •  traZODone (DESYREL) 150 MG tablet, Take 1 tablet by mouth Every Night. (Patient taking differently: Take 75 mg by mouth As Needed.), Disp: 30 tablet, Rfl: 5    Patient Active Problem List   Diagnosis   • Rash   • Gastroesophageal reflux disease   • Primary insomnia   • Hormone replacement therapy   • Hypertension   • Low back pain with right-sided sciatica   • Candida, oral   • Candida vaginitis   • Nausea and vomiting   • Acute URI   • Multiple sclerosis (CMS/HCC)   • Impacted cerumen of right ear   • Chronic low back pain   • Acute sinusitis   • Sinus headache   • Vitamin D deficiency   • Neuropathic pain   • Depression   • Hypersomnia, suspected KACIE   • Fatigue     Past Surgical History:   Procedure Laterality Date   •  SECTION     • HYSTERECTOMY     • INJECTION OF MEDICATION  2016    Celestone  (betamethasone) (1)      • INJECTION OF MEDICATION  05/10/2016    Rocephin (1)      • INJECTION OF MEDICATION  08/03/2015    Toradol (1)      • INJECTION OF MEDICATION  08/03/2015    Zofran (1)      • TUBAL ABDOMINAL LIGATION  2000    Examination under anesthesia and laparoscopic tubal.   • UPPER GASTROINTESTINAL ENDOSCOPY  02/24/2015    Hiatal hernia. Gastritis. Unspecified gastric ulcer. Performed at [facility].     Social History     Socioeconomic History   • Marital status: Single     Spouse name: Not on file   • Number of children: Not on file   • Years of education: Not on file   • Highest education level: Not on file   Tobacco Use   • Smoking status: Never Smoker   • Smokeless tobacco: Never Used   Substance and Sexual Activity   • Alcohol use: Yes     Comment: pt states she drinks about 2 a week   • Drug use: No   • Sexual activity: Defer     Family History   Problem Relation Age of Onset   • Cancer Maternal Grandmother    • Cancer Maternal Grandfather    • Cancer Paternal Grandmother    • Cancer Paternal Grandfather    • Breast cancer Other      Results Encounter on 07/01/2021   Component Date Value Ref Range Status   • Total Protein 07/21/2021 7.0  6.0 - 8.5 g/dL Final   • Albumin 07/21/2021 4.00  3.50 - 5.20 g/dL Final   • ALT (SGPT) 07/21/2021 10  1 - 33 U/L Final   • AST (SGOT) 07/21/2021 15  1 - 32 U/L Final   • Alkaline Phosphatase 07/21/2021 108  39 - 117 U/L Final   • Total Bilirubin 07/21/2021 0.2  0.0 - 1.2 mg/dL Final   • Bilirubin, Direct 07/21/2021 <0.2  0.0 - 0.3 mg/dL Final   • Bilirubin, Indirect 07/21/2021    Final    Unable to calculate   Office Visit on 05/06/2021   Component Date Value Ref Range Status   • THC, Screen, Urine 05/06/2021 Positive* Negative Final   • Phencyclidine (PCP), Urine 05/06/2021 Negative  Negative Final   • Cocaine Screen, Urine 05/06/2021 Negative  Negative Final   • Methamphetamine, Ur 05/06/2021 Negative  Negative Final   • Opiate Screen 05/06/2021 Positive*  Negative Final   • Amphetamine Screen, Urine 05/06/2021 Negative  Negative Final   • Benzodiazepine Screen, Urine 05/06/2021 Negative  Negative Final   • Tricyclic Antidepressants Screen 05/06/2021 Negative  Negative Final   • Methadone Screen, Urine 05/06/2021 Negative  Negative Final   • Barbiturates Screen, Urine 05/06/2021 Negative  Negative Final   • Oxycodone Screen, Urine 05/06/2021 Negative  Negative Final   • Propoxyphene Screen 05/06/2021 Negative  Negative Final   • Buprenorphine, Screen, Urine 05/06/2021 Negative  Negative Final   Lab on 04/05/2021   Component Date Value Ref Range Status   • Index Value 04/05/2021 0.42   Final   • JCV Antibody 04/05/2021 Positive*  Final    Index interpretive criteria:           <0.20 negative       0.20-0.40 indeterminate           >0.40 positive   • Interpretation 04/05/2021 Comment   Final    Comment: Negative: Antibodies to JCV not detected.  Indeterminate: Low level reactivity detected, see Inhibition                 Assay result to follow for the final antibody                 result.  Positive: Antibodies to DESTINY virus (JCV) detected indicating            the patient has been exposed to JCV at an            undetermined time.  The STRATIFY JCV Antibody Test is an enzyme-linked  immunosorbent assay (CHANTEL) designed to detect JCV antibodies  to help identify individuals who have been exposed to the  virus. Samples with low level reactivity in the detection  assay are retested in a confirmation (inhibition) assay to  confirm presence or absence of JCV-specific antibodies.  Retrospective analyses of post marketing data from various  sources, including observational studies and spontaneous  reports obtained worldwide, suggest that the risk of  developing PML may be associated with relative levels of  serum anti-JCV antibody as measured by anti-JCV antibody  index.1                                                                               .  1TYSABRI (natalizumab) US  Prescribing Information.   • Interpretation 04/05/2021 Comment   Final    Positive: Antibodies to DESTINY virus (JCV) detected            indicating the patient has been exposed            to JCV at an undetermined time  Negative: Antibodies to JCV not detected   • Total Protein 04/05/2021 6.6  6.0 - 8.5 g/dL Final   • Albumin 04/05/2021 3.80  3.50 - 5.20 g/dL Final   • ALT (SGPT) 04/05/2021 26  1 - 33 U/L Final   • AST (SGOT) 04/05/2021 30  1 - 32 U/L Final   • Alkaline Phosphatase 04/05/2021 145* 39 - 117 U/L Final   • Total Bilirubin 04/05/2021 0.2  0.0 - 1.2 mg/dL Final   • Bilirubin, Direct 04/05/2021 <0.2  0.0 - 0.3 mg/dL Final   • Bilirubin, Indirect 04/05/2021    Final    Unable to calculate   • C3 Complement 04/05/2021 138  82 - 167 mg/dL Final   • C4 Complement 04/05/2021 43* 12 - 38 mg/dL Final   Office Visit on 04/05/2021   Component Date Value Ref Range Status   • Iron 04/05/2021 52  37 - 145 mcg/dL Final   • Iron Saturation 04/05/2021 14* 20 - 50 % Final   • Transferrin 04/05/2021 258  200 - 360 mg/dL Final   • TIBC 04/05/2021 384  298 - 536 mcg/dL Final   • Ferritin 04/05/2021 28.70  13.00 - 150.00 ng/mL Final   • Vitamin B-12 04/05/2021 277  211 - 946 pg/mL Final   Infusion on 03/31/2021   Component Date Value Ref Range Status   • Index Value 03/31/2021 0.46   Final   • JCV Antibody 03/31/2021 Positive*  Final    **Verified by repeat analysis**  Index interpretive criteria:           <0.20 negative       0.20-0.40 indeterminate           >0.40 positive   • Interpretation 03/31/2021 Comment   Final    Comment: Negative: Antibodies to JCV not detected.  Indeterminate: Low level reactivity detected, see Inhibition                 Assay result to follow for the final antibody                 result.  Positive: Antibodies to DESTINY virus (JCV) detected indicating            the patient has been exposed to JCV at an            undetermined time.  The STRATIFY JCV Antibody Test is an enzyme-linked  immunosorbent  assay (CHANTEL) designed to detect JCV antibodies  to help identify individuals who have been exposed to the  virus. Samples with low level reactivity in the detection  assay are retested in a confirmation (inhibition) assay to  confirm presence or absence of JCV-specific antibodies.  Retrospective analyses of post marketing data from various  sources, including observational studies and spontaneous  reports obtained worldwide, suggest that the risk of  developing PML may be associated with relative levels of  serum anti-JCV antibody as measured by anti-JCV antibody  index.1                                                                               .  1TYSABRI (natalizumab) US Prescribing Information.   • Interpretation 03/31/2021 Comment   Final    Positive: Antibodies to DESTINY virus (JCV) detected            indicating the patient has been exposed            to JCV at an undetermined time  Negative: Antibodies to JCV not detected   Office Visit on 03/05/2021   Component Date Value Ref Range Status   • Glucose 03/05/2021 93  65 - 99 mg/dL Final   • BUN 03/05/2021 15  6 - 20 mg/dL Final   • Creatinine 03/05/2021 1.00  0.57 - 1.00 mg/dL Final   • Sodium 03/05/2021 141  136 - 145 mmol/L Final   • Potassium 03/05/2021 3.9  3.5 - 5.2 mmol/L Final   • Chloride 03/05/2021 107  98 - 107 mmol/L Final   • CO2 03/05/2021 24.3  22.0 - 29.0 mmol/L Final   • Calcium 03/05/2021 9.6  8.6 - 10.5 mg/dL Final   • Total Protein 03/05/2021 7.7  6.0 - 8.5 g/dL Final   • Albumin 03/05/2021 4.10  3.50 - 5.20 g/dL Final   • ALT (SGPT) 03/05/2021 43* 1 - 33 U/L Final   • AST (SGOT) 03/05/2021 72* 1 - 32 U/L Final   • Alkaline Phosphatase 03/05/2021 141* 39 - 117 U/L Final   • Total Bilirubin 03/05/2021 0.2  0.0 - 1.2 mg/dL Final   • eGFR   Amer 03/05/2021 73  >60 mL/min/1.73 Final   • Globulin 03/05/2021 3.6  gm/dL Final   • A/G Ratio 03/05/2021 1.1  g/dL Final   • BUN/Creatinine Ratio 03/05/2021 15.0  7.0 - 25.0 Final   • Anion Gap  03/05/2021 9.7  5.0 - 15.0 mmol/L Final   • Hemoglobin A1C 03/05/2021 5.95* 4.80 - 5.60 % Final      Polysomnography 4 or More Parameters  [place] [address]  Phone: [phone]  Fax: [phone]    Polysomnography Interpretation    Patient's Name: Jenn Good  YOB: 1975  Referring Physician:  Jair Gregg MD  Primary Care Physician: Nehemiah Chow MD  Date of Study: 06/13/2021  Ordering Provider: Dr. Jair Gregg  Interpreting Physician: Jair Gregg MD.  Date of Interpretation: 6/17/2021    Indications/Narrative:    Patient is a 46 y.o. female with history of daytime sleepiness and   hypersomnia with a BMI 38.4, neck circumference 14.5 inches, patient on   multiple medication including Adderall, Cymbalta, trazodone, melatonin,   Lyrica among others. This is a technical adequate study.  I have   personally reviewed the raw data and summarized as below. Patient slept   for a total of 381 minutes.    1. No REM sleep period, sleep latency was 26.8 minutes and the sleep   efficiency 88.6  2. Patient respiratory monitor showed No evidence CSA/KACIE, apnea hypopnea   index of 0.6 events per hour  3. The mean SaO2 during the sudy was 95% with a dameon of 92%.  4. The patient's EKG showed normal sinus rhythm with a mean heart rate of   66.5bpm.  5. Snoring was Audible during recording       Impression:   1. After review of respiratory data there is no evidence of a sleep   disordered breathing on this diagnostic polysomnogram, AHI was 0.6 events   per hour  2. Lack of REM sleep may be due to medications, REM suppressing   medications  3. Severity may have been underestimated due to the lack of REM sleep    Recommendation:  1. Consider other possible etiology for this patient symptoms of   hypersomnia such as medication induced, poor sleep hygiene, insufficient   sleep syndrome among others  2. Clinical correlation  recommended    Jair Gregg MD, FACP, FCCP  Diplomate in Pulmonary & Sleep Medicine    This document has been electronically signed by Jair Gregg MD on   June 17, 2021 22:42 CDT      EMR Dragon/Transcription disclaimer:   Some of this note may be an electronic transcription/translation of spoken   language to printed text. The electronic translation of spoken language   may permit erroneous, or at times, nonsensical words or phrases to be   inadvertently transcribed; Although I have reviewed the note for such   errors, some may still exist.      [unfilled]  Immunization History   Administered Date(s) Administered   • Flu Vaccine Intradermal Quad 18-64YR 12/14/2012   • Flu Vaccine Quad PF >36MO 12/01/2011, 11/04/2013   • Hep A / Hep B 01/14/2011   • Hepatitis B 07/12/2010, 08/31/2010   • Tdap 01/14/2011     The following portions of the patient's history were reviewed and updated as appropriate: allergies, current medications, past family history, past medical history, past social history, past surgical history and problem list.    PHQ-9 Total Score:           Physical Exam  Constitutional:       Appearance: Normal appearance.   HENT:      Head: Normocephalic and atraumatic.      Right Ear: External ear normal.      Left Ear: External ear normal.   Eyes:      General:         Right eye: No discharge.         Left eye: No discharge.      Conjunctiva/sclera: Conjunctivae normal.   Cardiovascular:      Rate and Rhythm: Normal rate and regular rhythm.      Pulses: Normal pulses.      Heart sounds: Normal heart sounds. No murmur heard.     Pulmonary:      Effort: Pulmonary effort is normal. No respiratory distress.      Breath sounds: Normal breath sounds.   Abdominal:      General: There is no distension.      Palpations: Abdomen is soft.      Tenderness: There is no abdominal tenderness.   Musculoskeletal:      Cervical back: Normal range of motion.      Right lower leg: No edema.      Left lower leg:  No edema.   Lymphadenopathy:      Cervical: No cervical adenopathy.   Neurological:      Mental Status: She is alert and oriented to person, place, and time. Mental status is at baseline.      Cranial Nerves: Cranial nerves are intact.      Coordination: Romberg sign positive. Finger-Nose-Finger Test abnormal. Impaired rapid alternating movements.      Gait: Gait abnormal.   Psychiatric:         Mood and Affect: Mood normal.         Behavior: Behavior normal.       Assessment/Plan    Diagnosis Plan   1. Multiple sclerosis exacerbation (CMS/[facility])        No orders of the defined types were placed in this encounter.    Multiple sclerosis exacerbation; patient with cerebellar symptoms, abnormal gait, recent falls. Also admits to episode of urinary incontinence, all of which concerning for MS flare.  After prolonged discussion with patient, will present to ER for acute inpatient/IV treatment.  Does have history of prediabetes, has needed insulin in the past with high-dose steroid, will need monitoring of glucose levels, patient agreeable to plan, will go to ER today.  Declined ambulance, patient has informed neurologist at [facility], awaiting call back.      Total time examining evaluating the patient, completing orders and counseling was 57min        This document has been electronically signed by Nehemiah Chow MD on August 3, 2021 09:52 CDT

## 2021-08-06 RX ORDER — SODIUM CHLORIDE 9 MG/ML
250 INJECTION, SOLUTION INTRAVENOUS ONCE
Status: CANCELLED | OUTPATIENT
Start: 2021-08-06

## 2021-08-11 ENCOUNTER — INFUSION (OUTPATIENT)
Dept: ONCOLOGY | Facility: HOSPITAL | Age: 46
End: 2021-08-11

## 2021-08-11 VITALS
RESPIRATION RATE: 20 BRPM | DIASTOLIC BLOOD PRESSURE: 70 MMHG | HEART RATE: 74 BPM | TEMPERATURE: 97.6 F | SYSTOLIC BLOOD PRESSURE: 139 MMHG

## 2021-08-11 DIAGNOSIS — G35 MULTIPLE SCLEROSIS (HCC): Primary | ICD-10-CM

## 2021-08-11 PROCEDURE — 25010000003 METHYLPREDNISOLONE PER 125 MG: Performed by: NURSE PRACTITIONER

## 2021-08-11 PROCEDURE — 96365 THER/PROPH/DIAG IV INF INIT: CPT | Performed by: NURSE PRACTITIONER

## 2021-08-11 RX ORDER — SODIUM CHLORIDE 9 MG/ML
250 INJECTION, SOLUTION INTRAVENOUS ONCE
Status: CANCELLED | OUTPATIENT
Start: 2021-08-12

## 2021-08-11 RX ORDER — SODIUM CHLORIDE 9 MG/ML
250 INJECTION, SOLUTION INTRAVENOUS ONCE
Status: COMPLETED | OUTPATIENT
Start: 2021-08-11 | End: 2021-08-11

## 2021-08-11 RX ADMIN — SODIUM CHLORIDE 1000 MG: 9 INJECTION, SOLUTION INTRAVENOUS at 11:08

## 2021-08-11 RX ADMIN — SODIUM CHLORIDE 250 ML: 9 INJECTION, SOLUTION INTRAVENOUS at 11:08

## 2021-08-12 ENCOUNTER — INFUSION (OUTPATIENT)
Dept: ONCOLOGY | Facility: HOSPITAL | Age: 46
End: 2021-08-12

## 2021-08-12 VITALS
HEART RATE: 88 BPM | DIASTOLIC BLOOD PRESSURE: 76 MMHG | SYSTOLIC BLOOD PRESSURE: 142 MMHG | RESPIRATION RATE: 18 BRPM | TEMPERATURE: 97.6 F

## 2021-08-12 DIAGNOSIS — G35 MULTIPLE SCLEROSIS (HCC): Primary | ICD-10-CM

## 2021-08-12 PROCEDURE — 25010000003 METHYLPREDNISOLONE PER 125 MG: Performed by: NURSE PRACTITIONER

## 2021-08-12 PROCEDURE — 96365 THER/PROPH/DIAG IV INF INIT: CPT | Performed by: INTERNAL MEDICINE

## 2021-08-12 RX ORDER — SODIUM CHLORIDE 9 MG/ML
250 INJECTION, SOLUTION INTRAVENOUS ONCE
Status: CANCELLED | OUTPATIENT
Start: 2021-08-13

## 2021-08-12 RX ORDER — SODIUM CHLORIDE 9 MG/ML
250 INJECTION, SOLUTION INTRAVENOUS ONCE
Status: COMPLETED | OUTPATIENT
Start: 2021-08-12 | End: 2021-08-12

## 2021-08-12 RX ADMIN — SODIUM CHLORIDE 250 ML: 9 INJECTION, SOLUTION INTRAVENOUS at 09:50

## 2021-08-12 RX ADMIN — SODIUM CHLORIDE 1000 MG: 9 INJECTION, SOLUTION INTRAVENOUS at 09:50

## 2021-08-12 NOTE — NURSING NOTE
Patient IV occluded, verify blood return, flush, visual kink at the hub of catheter, repositioned, dressing removed, sterile guaze and paper tape applied.  Patient wanting to save the IV for this infusion.  Informed patient that IV would need to be removed at the end of the infusion.  Selina Ibarra RN  August 12, 2021  09:50

## 2021-08-13 ENCOUNTER — INFUSION (OUTPATIENT)
Dept: ONCOLOGY | Facility: HOSPITAL | Age: 46
End: 2021-08-13

## 2021-08-13 VITALS
HEART RATE: 68 BPM | TEMPERATURE: 96.1 F | DIASTOLIC BLOOD PRESSURE: 88 MMHG | SYSTOLIC BLOOD PRESSURE: 158 MMHG | RESPIRATION RATE: 16 BRPM

## 2021-08-13 DIAGNOSIS — G35 MULTIPLE SCLEROSIS (HCC): Primary | ICD-10-CM

## 2021-08-13 PROCEDURE — 25010000003 METHYLPREDNISOLONE PER 125 MG: Performed by: NURSE PRACTITIONER

## 2021-08-13 PROCEDURE — 96365 THER/PROPH/DIAG IV INF INIT: CPT | Performed by: NURSE PRACTITIONER

## 2021-08-13 RX ORDER — SODIUM CHLORIDE 9 MG/ML
250 INJECTION, SOLUTION INTRAVENOUS ONCE
Status: COMPLETED | OUTPATIENT
Start: 2021-08-13 | End: 2021-08-13

## 2021-08-13 RX ORDER — SODIUM CHLORIDE 9 MG/ML
250 INJECTION, SOLUTION INTRAVENOUS ONCE
Status: CANCELLED | OUTPATIENT
Start: 2021-08-13

## 2021-08-13 RX ADMIN — SODIUM CHLORIDE 250 ML: 9 INJECTION, SOLUTION INTRAVENOUS at 09:41

## 2021-08-13 RX ADMIN — SODIUM CHLORIDE 1000 MG: 9 INJECTION, SOLUTION INTRAVENOUS at 10:02

## 2021-08-18 ENCOUNTER — APPOINTMENT (OUTPATIENT)
Dept: ONCOLOGY | Facility: HOSPITAL | Age: 46
End: 2021-08-18

## 2021-08-19 ENCOUNTER — TELEPHONE (OUTPATIENT)
Dept: FAMILY MEDICINE CLINIC | Facility: CLINIC | Age: 46
End: 2021-08-19

## 2021-08-19 DIAGNOSIS — B37.0 THRUSH: Primary | ICD-10-CM

## 2021-08-19 RX ORDER — ACETAMINOPHEN 325 MG/1
650 TABLET ORAL AS NEEDED
Status: CANCELLED
Start: 2021-08-19

## 2021-08-19 RX ORDER — SODIUM CHLORIDE 9 MG/ML
250 INJECTION, SOLUTION INTRAVENOUS ONCE
Status: CANCELLED | OUTPATIENT
Start: 2021-08-19

## 2021-08-19 RX ORDER — LORATADINE 10 MG/1
10 TABLET ORAL DAILY
Status: CANCELLED
Start: 2021-08-19

## 2021-08-19 RX ORDER — DIPHENHYDRAMINE HYDROCHLORIDE 50 MG/ML
25 INJECTION INTRAMUSCULAR; INTRAVENOUS AS NEEDED
Status: CANCELLED
Start: 2021-08-19

## 2021-08-19 RX ORDER — ONDANSETRON 2 MG/ML
4 INJECTION INTRAMUSCULAR; INTRAVENOUS AS NEEDED
Status: CANCELLED
Start: 2021-08-19

## 2021-08-19 RX ORDER — IBUPROFEN 200 MG
400 TABLET ORAL AS NEEDED
Status: CANCELLED
Start: 2021-08-19

## 2021-08-19 RX ORDER — ONDANSETRON HYDROCHLORIDE 4 MG/5ML
4 SOLUTION ORAL ONCE
Status: CANCELLED
Start: 2021-08-19 | End: 2021-08-19

## 2021-08-19 RX ORDER — ACETAMINOPHEN 325 MG/1
650 TABLET ORAL ONCE
Status: CANCELLED | OUTPATIENT
Start: 2021-08-19

## 2021-08-19 NOTE — TELEPHONE ENCOUNTER
Patient would like to know if you would call something in for thrush in her mouth.. please send to [facility] [address] [facility] [address]

## 2021-08-25 ENCOUNTER — INFUSION (OUTPATIENT)
Dept: ONCOLOGY | Facility: HOSPITAL | Age: 46
End: 2021-08-25

## 2021-08-25 VITALS
RESPIRATION RATE: 18 BRPM | TEMPERATURE: 97 F | DIASTOLIC BLOOD PRESSURE: 81 MMHG | HEART RATE: 72 BPM | SYSTOLIC BLOOD PRESSURE: 138 MMHG

## 2021-08-25 DIAGNOSIS — G35 MULTIPLE SCLEROSIS (HCC): Primary | ICD-10-CM

## 2021-08-25 PROCEDURE — 25010000002 NATALIZUMAB PER 1 MG: Performed by: NURSE PRACTITIONER

## 2021-08-25 PROCEDURE — 96365 THER/PROPH/DIAG IV INF INIT: CPT | Performed by: NURSE PRACTITIONER

## 2021-08-25 RX ORDER — SODIUM CHLORIDE 9 MG/ML
250 INJECTION, SOLUTION INTRAVENOUS ONCE
Status: CANCELLED | OUTPATIENT
Start: 2021-09-22

## 2021-08-25 RX ORDER — IBUPROFEN 400 MG/1
400 TABLET ORAL AS NEEDED
Status: CANCELLED
Start: 2021-09-22

## 2021-08-25 RX ORDER — ACETAMINOPHEN 325 MG/1
650 TABLET ORAL ONCE
Status: CANCELLED | OUTPATIENT
Start: 2021-09-22

## 2021-08-25 RX ORDER — LORATADINE 10 MG/1
10 TABLET ORAL DAILY
Status: CANCELLED
Start: 2021-09-22

## 2021-08-25 RX ORDER — LORATADINE 10 MG/1
10 TABLET ORAL DAILY
Status: DISCONTINUED | OUTPATIENT
Start: 2021-08-25 | End: 2021-08-25 | Stop reason: HOSPADM

## 2021-08-25 RX ORDER — ACETAMINOPHEN 325 MG/1
650 TABLET ORAL ONCE
Status: DISCONTINUED | OUTPATIENT
Start: 2021-08-25 | End: 2021-08-25 | Stop reason: HOSPADM

## 2021-08-25 RX ORDER — ACETAMINOPHEN 325 MG/1
650 TABLET ORAL AS NEEDED
Status: CANCELLED
Start: 2021-09-22

## 2021-08-25 RX ORDER — ONDANSETRON 2 MG/ML
4 INJECTION INTRAMUSCULAR; INTRAVENOUS AS NEEDED
Status: CANCELLED
Start: 2021-09-22

## 2021-08-25 RX ORDER — SODIUM CHLORIDE 9 MG/ML
250 INJECTION, SOLUTION INTRAVENOUS ONCE
Status: COMPLETED | OUTPATIENT
Start: 2021-08-25 | End: 2021-08-25

## 2021-08-25 RX ORDER — DIPHENHYDRAMINE HYDROCHLORIDE 50 MG/ML
25 INJECTION INTRAMUSCULAR; INTRAVENOUS AS NEEDED
Status: CANCELLED
Start: 2021-09-22

## 2021-08-25 RX ORDER — ONDANSETRON 4 MG/1
4 TABLET, FILM COATED ORAL ONCE
Status: DISCONTINUED | OUTPATIENT
Start: 2021-08-25 | End: 2021-08-25 | Stop reason: HOSPADM

## 2021-08-25 RX ORDER — ONDANSETRON HYDROCHLORIDE 4 MG/5ML
4 SOLUTION ORAL ONCE
Status: CANCELLED
Start: 2021-09-22 | End: 2021-09-22

## 2021-08-25 RX ADMIN — NATALIZUMAB 300 MG: 300 INJECTION INTRAVENOUS at 11:57

## 2021-08-25 RX ADMIN — SODIUM CHLORIDE 250 ML: 9 INJECTION, SOLUTION INTRAVENOUS at 10:55

## 2021-08-27 DIAGNOSIS — G35 MULTIPLE SCLEROSIS (HCC): ICD-10-CM

## 2021-08-27 DIAGNOSIS — G89.29 CHRONIC PAIN OF RIGHT KNEE: ICD-10-CM

## 2021-08-27 DIAGNOSIS — M25.561 CHRONIC PAIN OF RIGHT KNEE: ICD-10-CM

## 2021-08-30 RX ORDER — HYDROCODONE BITARTRATE AND ACETAMINOPHEN 7.5; 325 MG/1; MG/1
1 TABLET ORAL EVERY 8 HOURS PRN
Qty: 90 TABLET | Refills: 0 | Status: SHIPPED | OUTPATIENT
Start: 2021-08-30 | End: 2021-09-27 | Stop reason: SDUPTHER

## 2021-09-22 ENCOUNTER — INFUSION (OUTPATIENT)
Dept: ONCOLOGY | Facility: HOSPITAL | Age: 46
End: 2021-09-22

## 2021-09-22 VITALS
DIASTOLIC BLOOD PRESSURE: 73 MMHG | TEMPERATURE: 97.9 F | SYSTOLIC BLOOD PRESSURE: 145 MMHG | RESPIRATION RATE: 16 BRPM | HEART RATE: 86 BPM

## 2021-09-22 DIAGNOSIS — G35 MULTIPLE SCLEROSIS (HCC): Primary | ICD-10-CM

## 2021-09-22 DIAGNOSIS — G35 MULTIPLE SCLEROSIS (HCC): ICD-10-CM

## 2021-09-22 PROCEDURE — 25010000002 NATALIZUMAB PER 1 MG: Performed by: NURSE PRACTITIONER

## 2021-09-22 PROCEDURE — 96365 THER/PROPH/DIAG IV INF INIT: CPT | Performed by: NURSE PRACTITIONER

## 2021-09-22 RX ORDER — IBUPROFEN 400 MG/1
400 TABLET ORAL AS NEEDED
Status: CANCELLED
Start: 2021-10-20

## 2021-09-22 RX ORDER — LORATADINE 10 MG/1
10 TABLET ORAL DAILY
Status: CANCELLED
Start: 2021-10-20

## 2021-09-22 RX ORDER — ACETAMINOPHEN 325 MG/1
650 TABLET ORAL ONCE
Status: CANCELLED | OUTPATIENT
Start: 2021-10-20

## 2021-09-22 RX ORDER — ACETAMINOPHEN 325 MG/1
650 TABLET ORAL AS NEEDED
Status: CANCELLED
Start: 2021-10-20

## 2021-09-22 RX ORDER — SODIUM CHLORIDE 9 MG/ML
250 INJECTION, SOLUTION INTRAVENOUS ONCE
Status: COMPLETED | OUTPATIENT
Start: 2021-09-22 | End: 2021-09-22

## 2021-09-22 RX ORDER — ONDANSETRON HYDROCHLORIDE 4 MG/5ML
4 SOLUTION ORAL ONCE
Status: CANCELLED
Start: 2021-10-20 | End: 2021-10-20

## 2021-09-22 RX ORDER — ONDANSETRON 2 MG/ML
4 INJECTION INTRAMUSCULAR; INTRAVENOUS AS NEEDED
Status: CANCELLED
Start: 2021-10-20

## 2021-09-22 RX ORDER — SODIUM CHLORIDE 9 MG/ML
250 INJECTION, SOLUTION INTRAVENOUS ONCE
Status: CANCELLED | OUTPATIENT
Start: 2021-10-20

## 2021-09-22 RX ORDER — DIPHENHYDRAMINE HYDROCHLORIDE 50 MG/ML
25 INJECTION INTRAMUSCULAR; INTRAVENOUS AS NEEDED
Status: CANCELLED
Start: 2021-10-20

## 2021-09-22 RX ADMIN — NATALIZUMAB 300 MG: 300 INJECTION INTRAVENOUS at 09:48

## 2021-09-22 RX ADMIN — SODIUM CHLORIDE 250 ML: 9 INJECTION, SOLUTION INTRAVENOUS at 09:39

## 2021-09-27 ENCOUNTER — OFFICE VISIT (OUTPATIENT)
Dept: FAMILY MEDICINE CLINIC | Facility: CLINIC | Age: 46
End: 2021-09-27

## 2021-09-27 VITALS
HEART RATE: 77 BPM | BODY MASS INDEX: 39.95 KG/M2 | WEIGHT: 248.6 LBS | OXYGEN SATURATION: 98 % | TEMPERATURE: 97.3 F | SYSTOLIC BLOOD PRESSURE: 112 MMHG | HEIGHT: 66 IN | DIASTOLIC BLOOD PRESSURE: 82 MMHG

## 2021-09-27 DIAGNOSIS — G89.29 OTHER CHRONIC PAIN: ICD-10-CM

## 2021-09-27 DIAGNOSIS — F41.9 ANXIETY: ICD-10-CM

## 2021-09-27 DIAGNOSIS — I10 ESSENTIAL HYPERTENSION: ICD-10-CM

## 2021-09-27 DIAGNOSIS — M25.561 CHRONIC PAIN OF RIGHT KNEE: ICD-10-CM

## 2021-09-27 DIAGNOSIS — Z12.31 ENCOUNTER FOR SCREENING MAMMOGRAM FOR MALIGNANT NEOPLASM OF BREAST: ICD-10-CM

## 2021-09-27 DIAGNOSIS — G89.29 CHRONIC PAIN OF RIGHT KNEE: ICD-10-CM

## 2021-09-27 DIAGNOSIS — G35 MULTIPLE SCLEROSIS (HCC): Primary | ICD-10-CM

## 2021-09-27 DIAGNOSIS — F51.01 PRIMARY INSOMNIA: ICD-10-CM

## 2021-09-27 PROCEDURE — 99214 OFFICE O/P EST MOD 30 MIN: CPT | Performed by: STUDENT IN AN ORGANIZED HEALTH CARE EDUCATION/TRAINING PROGRAM

## 2021-09-27 RX ORDER — TRAZODONE HYDROCHLORIDE 100 MG/1
100 TABLET ORAL NIGHTLY
Qty: 90 TABLET | Refills: 3 | Status: SHIPPED | OUTPATIENT
Start: 2021-09-27

## 2021-09-27 RX ORDER — DILTIAZEM HYDROCHLORIDE 240 MG/1
240 CAPSULE, COATED, EXTENDED RELEASE ORAL DAILY
Qty: 90 CAPSULE | Refills: 3 | Status: SHIPPED | OUTPATIENT
Start: 2021-09-27 | End: 2023-03-30 | Stop reason: SDUPTHER

## 2021-09-27 RX ORDER — HYDROCODONE BITARTRATE AND ACETAMINOPHEN 7.5; 325 MG/1; MG/1
1 TABLET ORAL EVERY 8 HOURS PRN
Qty: 90 TABLET | Refills: 0 | Status: SHIPPED | OUTPATIENT
Start: 2021-09-27 | End: 2021-11-01 | Stop reason: SDUPTHER

## 2021-09-27 RX ORDER — DIPHENHYDRAMINE HCL 25 MG
50 CAPSULE ORAL NIGHTLY PRN
COMMUNITY

## 2021-09-27 RX ORDER — BACLOFEN 20 MG/1
1.5 TABLET ORAL 4 TIMES DAILY PRN
COMMUNITY
Start: 2021-08-20

## 2021-10-20 ENCOUNTER — INFUSION (OUTPATIENT)
Dept: ONCOLOGY | Facility: HOSPITAL | Age: 46
End: 2021-10-20

## 2021-10-20 VITALS
HEART RATE: 70 BPM | TEMPERATURE: 97 F | RESPIRATION RATE: 18 BRPM | SYSTOLIC BLOOD PRESSURE: 132 MMHG | DIASTOLIC BLOOD PRESSURE: 78 MMHG

## 2021-10-20 DIAGNOSIS — G35 MULTIPLE SCLEROSIS (HCC): Primary | ICD-10-CM

## 2021-10-20 PROCEDURE — 96365 THER/PROPH/DIAG IV INF INIT: CPT | Performed by: NURSE PRACTITIONER

## 2021-10-20 PROCEDURE — 25010000002 NATALIZUMAB PER 1 MG: Performed by: NURSE PRACTITIONER

## 2021-10-20 RX ORDER — ACETAMINOPHEN 325 MG/1
650 TABLET ORAL ONCE
Status: CANCELLED | OUTPATIENT
Start: 2021-11-17

## 2021-10-20 RX ORDER — DIPHENHYDRAMINE HYDROCHLORIDE 50 MG/ML
25 INJECTION INTRAMUSCULAR; INTRAVENOUS AS NEEDED
Status: CANCELLED
Start: 2021-11-17

## 2021-10-20 RX ORDER — SODIUM CHLORIDE 9 MG/ML
250 INJECTION, SOLUTION INTRAVENOUS ONCE
Status: CANCELLED | OUTPATIENT
Start: 2021-11-17

## 2021-10-20 RX ORDER — LORATADINE 10 MG/1
10 TABLET ORAL DAILY
Status: DISCONTINUED | OUTPATIENT
Start: 2021-10-20 | End: 2021-10-20 | Stop reason: HOSPADM

## 2021-10-20 RX ORDER — ONDANSETRON 2 MG/ML
4 INJECTION INTRAMUSCULAR; INTRAVENOUS AS NEEDED
Status: CANCELLED
Start: 2021-11-17

## 2021-10-20 RX ORDER — IBUPROFEN 400 MG/1
400 TABLET ORAL AS NEEDED
Status: CANCELLED
Start: 2021-11-17

## 2021-10-20 RX ORDER — SODIUM CHLORIDE 9 MG/ML
250 INJECTION, SOLUTION INTRAVENOUS ONCE
Status: COMPLETED | OUTPATIENT
Start: 2021-10-20 | End: 2021-10-20

## 2021-10-20 RX ORDER — ACETAMINOPHEN 325 MG/1
650 TABLET ORAL AS NEEDED
Status: CANCELLED
Start: 2021-11-17

## 2021-10-20 RX ORDER — ONDANSETRON HYDROCHLORIDE 4 MG/5ML
4 SOLUTION ORAL ONCE
Status: DISCONTINUED | OUTPATIENT
Start: 2021-10-20 | End: 2021-10-20

## 2021-10-20 RX ORDER — ONDANSETRON 4 MG/1
4 TABLET, FILM COATED ORAL ONCE
Status: DISCONTINUED | OUTPATIENT
Start: 2021-10-20 | End: 2021-10-20 | Stop reason: HOSPADM

## 2021-10-20 RX ORDER — LORATADINE 10 MG/1
10 TABLET ORAL DAILY
Status: CANCELLED
Start: 2021-11-18

## 2021-10-20 RX ORDER — ONDANSETRON HYDROCHLORIDE 4 MG/5ML
4 SOLUTION ORAL ONCE
Status: CANCELLED
Start: 2021-11-17 | End: 2021-11-17

## 2021-10-20 RX ORDER — ACETAMINOPHEN 325 MG/1
650 TABLET ORAL ONCE
Status: DISCONTINUED | OUTPATIENT
Start: 2021-10-20 | End: 2021-10-20 | Stop reason: HOSPADM

## 2021-10-20 RX ADMIN — NATALIZUMAB 300 MG: 300 INJECTION INTRAVENOUS at 10:09

## 2021-10-20 RX ADMIN — SODIUM CHLORIDE 250 ML: 9 INJECTION, SOLUTION INTRAVENOUS at 10:08

## 2021-11-01 ENCOUNTER — TELEPHONE (OUTPATIENT)
Dept: FAMILY MEDICINE CLINIC | Facility: CLINIC | Age: 46
End: 2021-11-01

## 2021-11-01 DIAGNOSIS — G89.29 CHRONIC PAIN OF RIGHT KNEE: ICD-10-CM

## 2021-11-01 DIAGNOSIS — G89.29 OTHER CHRONIC PAIN: ICD-10-CM

## 2021-11-01 DIAGNOSIS — M25.561 CHRONIC PAIN OF RIGHT KNEE: ICD-10-CM

## 2021-11-01 NOTE — TELEPHONE ENCOUNTER
Pharmacy told the patient that the Rx (Hydrocodone) was not open and for her to call her PCP. Please give her a call

## 2021-11-02 RX ORDER — HYDROCODONE BITARTRATE AND ACETAMINOPHEN 7.5; 325 MG/1; MG/1
1 TABLET ORAL EVERY 8 HOURS PRN
Qty: 90 TABLET | Refills: 0 | Status: SHIPPED | OUTPATIENT
Start: 2021-11-02 | End: 2021-12-02 | Stop reason: SDUPTHER

## 2021-11-02 NOTE — TELEPHONE ENCOUNTER
"Rx Refill Note  Requested Prescriptions     Pending Prescriptions Disp Refills   • HYDROcodone-acetaminophen (NORCO) 7.5-325 MG per tablet 90 tablet 0     Sig: Take 1 tablet by mouth Every 8 (Eight) Hours As Needed for Moderate Pain .      Last office visit with prescribing clinician: 9/27/2021      Next office visit with prescribing clinician: 12/27/2021            Sia Bealelijah Beto Rep  11/02/21, 12:04 CDT     Called pt back and she stated that the pharmacy told her that her Norco script was no longer open and that she would need to get a refill of it from Dr Chow.    Last script was sent in by Dr Chow on 9/27/21 for Norco 7.5-325 mg tabs with sig \"Take 1 tablet by mouth every 8 hours as needed for moderate pain\". She received 90 tabs with no refills.  "

## 2021-11-03 NOTE — PROGRESS NOTES
"Subjective:  Jenn Good is a 46 y.o. female who presents for     MS; at previous appointment, was diagnosed with MS flare, received IV therapy, and has since had resolution of symptoms.  No weakness, numbness, tingling, balance issues.  After recent flare, has had increased anxiety with regards to diagnosis, does not see counselor, and she been doing so now.  Denied any SI/HI/AV hallucinations or panic attacks.    Chronic right knee pain; has been on Norco 7.5 mg 3 times daily as needed, tolerating medication well, no adverse effects, providing good relief.    Hypertension; needs refills on diltiazem 240 mg extended release, tolerating Acacian well, no adverse effects.  Additionally on telmisartan.  Blood pressure today is 112/82, states blood pressure has been well controlled at home.    Patient Active Problem List   Diagnosis   • Rash   • Gastroesophageal reflux disease   • Primary insomnia   • Hormone replacement therapy   • Hypertension   • Low back pain with right-sided sciatica   • Candida, oral   • Candida vaginitis   • Nausea and vomiting   • Acute URI   • Multiple sclerosis (HCC)   • Impacted cerumen of right ear   • Chronic low back pain   • Acute sinusitis   • Sinus headache   • Vitamin D deficiency   • Neuropathic pain   • Depression   • Hypersomnia, suspected KACIE   • Fatigue     Vitals:    Vitals:    09/27/21 1034   BP: 112/82   BP Location: Left arm   Patient Position: Sitting   Cuff Size: Large Adult   Pulse: 77   Temp: 97.3 °F (36.3 °C)   SpO2: 98%   Weight: 113 kg (248 lb 9.6 oz)   Height: 167.6 cm (66\")     Body mass index is 40.13 kg/m².      Current Outpatient Medications:   •  amphetamine-dextroamphetamine (ADDERALL) 10 MG tablet, Take 10 mg by mouth., Disp: , Rfl:   •  baclofen (LIORESAL) 20 MG tablet, Take 1.5 tablets by mouth 4 (Four) Times a Day As Needed., Disp: , Rfl:   •  diclofenac (VOLTAREN) 75 MG EC tablet, Take 1 tablet by mouth 2 (Two) Times a Day., Disp: 60 tablet, Rfl: 5  •  " dilTIAZem CD (Cartia XT) 240 MG 24 hr capsule, Take 1 capsule by mouth Daily., Disp: 90 capsule, Rfl: 3  •  diphenhydrAMINE (BENADRYL) 25 mg capsule, Take 50 mg by mouth At Night As Needed for Sleep., Disp: , Rfl:   •  DULoxetine (CYMBALTA) 30 MG capsule, Take 30 mg by mouth Daily., Disp: , Rfl:   •  fluticasone (FLONASE) 50 MCG/ACT nasal spray, 2 sprays into the nostril(s) as directed by provider Daily., Disp: 16 g, Rfl: 0  •  loratadine (Claritin) 10 MG tablet, Take 1 tablet by mouth Daily., Disp: 90 tablet, Rfl: 1  •  melatonin 5 MG tablet tablet, Take 5 mg by mouth., Disp: , Rfl:   •  natalizumab (TYSABRI) 300 MG/15ML injection, Infuse  into a venous catheter., Disp: , Rfl:   •  nystatin (MYCOSTATIN) 130282 UNIT/ML suspension, Swish and swallow 5 mL 4 (Four) Times a Day., Disp: 60 mL, Rfl: 5  •  omeprazole (priLOSEC) 40 MG capsule, Take 1 capsule by mouth Daily., Disp: 90 capsule, Rfl: 1  •  telmisartan (MICARDIS) 40 MG tablet, Take 40 mg by mouth Daily., Disp: , Rfl: 3  •  tiZANidine (ZANAFLEX) 4 MG tablet, Take 4 mg by mouth At Night As Needed for muscle spasms., Disp: , Rfl:   •  HYDROcodone-acetaminophen (NORCO) 7.5-325 MG per tablet, Take 1 tablet by mouth Every 8 (Eight) Hours As Needed for Moderate Pain ., Disp: 90 tablet, Rfl: 0  •  traZODone (DESYREL) 100 MG tablet, Take 1 tablet by mouth Every Night., Disp: 90 tablet, Rfl: 3    Patient Active Problem List   Diagnosis   • Rash   • Gastroesophageal reflux disease   • Primary insomnia   • Hormone replacement therapy   • Hypertension   • Low back pain with right-sided sciatica   • Candida, oral   • Candida vaginitis   • Nausea and vomiting   • Acute URI   • Multiple sclerosis (HCC)   • Impacted cerumen of right ear   • Chronic low back pain   • Acute sinusitis   • Sinus headache   • Vitamin D deficiency   • Neuropathic pain   • Depression   • Hypersomnia, suspected KACIE   • Fatigue     Past Surgical History:   Procedure Laterality Date   •  SECTION      • HYSTERECTOMY  2009   • INJECTION OF MEDICATION  06/27/2016    Celestone (betamethasone) (1)      • INJECTION OF MEDICATION  05/10/2016    Rocephin (1)      • INJECTION OF MEDICATION  08/03/2015    Toradol (1)      • INJECTION OF MEDICATION  08/03/2015    Zofran (1)      • TUBAL ABDOMINAL LIGATION  2000    Examination under anesthesia and laparoscopic tubal.   • UPPER GASTROINTESTINAL ENDOSCOPY  02/24/2015    Hiatal hernia. Gastritis. Unspecified gastric ulcer. Performed at [facility].     Social History     Socioeconomic History   • Marital status: Single   Tobacco Use   • Smoking status: Never Smoker   • Smokeless tobacco: Never Used   Vaping Use   • Vaping Use: Never used   Substance and Sexual Activity   • Alcohol use: Yes     Alcohol/week: 2.0 standard drinks     Types: 2 Glasses of wine per week     Comment: pt states she drinks about 2 a week   • Drug use: No   • Sexual activity: Defer     Family History   Problem Relation Age of Onset   • Cancer Maternal Grandmother    • Cancer Maternal Grandfather    • Cancer Paternal Grandmother    • Cancer Paternal Grandfather    • Breast cancer Other      Results Encounter on 07/01/2021   Component Date Value Ref Range Status   • Total Protein 07/21/2021 7.0  6.0 - 8.5 g/dL Final   • Albumin 07/21/2021 4.00  3.50 - 5.20 g/dL Final   • ALT (SGPT) 07/21/2021 10  1 - 33 U/L Final   • AST (SGOT) 07/21/2021 15  1 - 32 U/L Final   • Alkaline Phosphatase 07/21/2021 108  39 - 117 U/L Final   • Total Bilirubin 07/21/2021 0.2  0.0 - 1.2 mg/dL Final   • Bilirubin, Direct 07/21/2021 <0.2  0.0 - 0.3 mg/dL Final   • Bilirubin, Indirect 07/21/2021    Final    Unable to calculate   Office Visit on 05/06/2021   Component Date Value Ref Range Status   • THC, Screen, Urine 05/06/2021 Positive* Negative Final   • Phencyclidine (PCP), Urine 05/06/2021 Negative  Negative Final   • Cocaine Screen, Urine 05/06/2021 Negative  Negative Final   • Methamphetamine, Ur 05/06/2021 Negative   Negative Final   • Opiate Screen 05/06/2021 Positive* Negative Final   • Amphetamine Screen, Urine 05/06/2021 Negative  Negative Final   • Benzodiazepine Screen, Urine 05/06/2021 Negative  Negative Final   • Tricyclic Antidepressants Screen 05/06/2021 Negative  Negative Final   • Methadone Screen, Urine 05/06/2021 Negative  Negative Final   • Barbiturates Screen, Urine 05/06/2021 Negative  Negative Final   • Oxycodone Screen, Urine 05/06/2021 Negative  Negative Final   • Propoxyphene Screen 05/06/2021 Negative  Negative Final   • Buprenorphine, Screen, Urine 05/06/2021 Negative  Negative Final   Lab on 04/05/2021   Component Date Value Ref Range Status   • Index Value 04/05/2021 0.42   Final   • JCV Antibody 04/05/2021 Positive*  Final    Index interpretive criteria:           <0.20 negative       0.20-0.40 indeterminate           >0.40 positive   • Interpretation 04/05/2021 Comment   Final    Comment: Negative: Antibodies to JCV not detected.  Indeterminate: Low level reactivity detected, see Inhibition                 Assay result to follow for the final antibody                 result.  Positive: Antibodies to DESTINY virus (JCV) detected indicating            the patient has been exposed to JCV at an            undetermined time.  The STRATIFY JCV Antibody Test is an enzyme-linked  immunosorbent assay (CHANTEL) designed to detect JCV antibodies  to help identify individuals who have been exposed to the  virus. Samples with low level reactivity in the detection  assay are retested in a confirmation (inhibition) assay to  confirm presence or absence of JCV-specific antibodies.  Retrospective analyses of post marketing data from various  sources, including observational studies and spontaneous  reports obtained worldwide, suggest that the risk of  developing PML may be associated with relative levels of  serum anti-JCV antibody as measured by anti-JCV antibody  index.1                                                                                .  1TYSABRI (natalizumab) [place] Prescribing Information.   • Interpretation 04/05/2021 Comment   Final    Positive: Antibodies to DESTINY virus (JCV) detected            indicating the patient has been exposed            to JCV at an undetermined time  Negative: Antibodies to JCV not detected   • Total Protein 04/05/2021 6.6  6.0 - 8.5 g/dL Final   • Albumin 04/05/2021 3.80  3.50 - 5.20 g/dL Final   • ALT (SGPT) 04/05/2021 26  1 - 33 U/L Final   • AST (SGOT) 04/05/2021 30  1 - 32 U/L Final   • Alkaline Phosphatase 04/05/2021 145* 39 - 117 U/L Final   • Total Bilirubin 04/05/2021 0.2  0.0 - 1.2 mg/dL Final   • Bilirubin, Direct 04/05/2021 <0.2  0.0 - 0.3 mg/dL Final   • Bilirubin, Indirect 04/05/2021    Final    Unable to calculate   • C3 Complement 04/05/2021 138  82 - 167 mg/dL Final   • C4 Complement 04/05/2021 43* 12 - 38 mg/dL Final   Office Visit on 04/05/2021   Component Date Value Ref Range Status   • Iron 04/05/2021 52  37 - 145 mcg/dL Final   • Iron Saturation 04/05/2021 14* 20 - 50 % Final   • Transferrin 04/05/2021 258  200 - 360 mg/dL Final   • TIBC 04/05/2021 384  298 - 536 mcg/dL Final   • Ferritin 04/05/2021 28.70  13.00 - 150.00 ng/mL Final   • Vitamin B-12 04/05/2021 277  211 - 946 pg/mL Final   Infusion on 03/31/2021   Component Date Value Ref Range Status   • Index Value 03/31/2021 0.46   Final   • JCV Antibody 03/31/2021 Positive*  Final    **Verified by repeat analysis**  Index interpretive criteria:           <0.20 negative       0.20-0.40 indeterminate           >0.40 positive   • Interpretation 03/31/2021 Comment   Final    Comment: Negative: Antibodies to JCV not detected.  Indeterminate: Low level reactivity detected, see Inhibition                 Assay result to follow for the final antibody                 result.  Positive: Antibodies to DESTINY virus (JCV) detected indicating            the patient has been exposed to JCV at an            undetermined time.  The STRATIFY JCV  Antibody Test is an enzyme-linked  immunosorbent assay (CHANTEL) designed to detect JCV antibodies  to help identify individuals who have been exposed to the  virus. Samples with low level reactivity in the detection  assay are retested in a confirmation (inhibition) assay to  confirm presence or absence of JCV-specific antibodies.  Retrospective analyses of post marketing data from various  sources, including observational studies and spontaneous  reports obtained worldwide, suggest that the risk of  developing PML may be associated with relative levels of  serum anti-JCV antibody as measured by anti-JCV antibody  index.1                                                                               .  1TYSABRI (natalizumab) [place] Prescribing Information.   • Interpretation 03/31/2021 Comment   Final    Positive: Antibodies to DESTINY virus (JCV) detected            indicating the patient has been exposed            to JCV at an undetermined time  Negative: Antibodies to JCV not detected      Polysomnography 4 or More Parameters  [place] [address]  Phone: [phone]  Fax: [phone]    Polysomnography Interpretation    Patient's Name: Jenn Good  YOB: 1975  Referring Physician:  Jair Gregg MD  Primary Care Physician: Nehemiah Chow MD  Date of Study: 06/13/2021  Ordering Provider: Dr. Jair Gregg  Interpreting Physician: Jair Gregg MD.  Date of Interpretation: 6/17/2021    Indications/Narrative:    Patient is a 46 y.o. female with history of daytime sleepiness and   hypersomnia with a BMI 38.4, neck circumference 14.5 inches, patient on   multiple medication including Adderall, Cymbalta, trazodone, melatonin,   Lyrica among others. This is a technical adequate study.  I have   personally reviewed the raw data and summarized as below. Patient slept   for a total of 381 minutes.    1. No REM sleep period, sleep latency  was 26.8 minutes and the sleep   efficiency 88.6  2. Patient respiratory monitor showed No evidence CSA/KACIE, apnea hypopnea   index of 0.6 events per hour  3. The mean SaO2 during the sudy was 95% with a dameon of 92%.  4. The patient's EKG showed normal sinus rhythm with a mean heart rate of   66.5bpm.  5. Snoring was Audible during recording       Impression:   1. After review of respiratory data there is no evidence of a sleep   disordered breathing on this diagnostic polysomnogram, AHI was 0.6 events   per hour  2. Lack of REM sleep may be due to medications, REM suppressing   medications  3. Severity may have been underestimated due to the lack of REM sleep    Recommendation:  1. Consider other possible etiology for this patient symptoms of   hypersomnia such as medication induced, poor sleep hygiene, insufficient   sleep syndrome among others  2. Clinical correlation recommended    Jair Gregg MD, FACP, FCCP  Diplomate in Pulmonary & Sleep Medicine    This document has been electronically signed by Jair Gregg MD on   June 17, 2021 22:42 CDT      EMR Dragon/Transcription disclaimer:   Some of this note may be an electronic transcription/translation of spoken   language to printed text. The electronic translation of spoken language   may permit erroneous, or at times, nonsensical words or phrases to be   inadvertently transcribed; Although I have reviewed the note for such   errors, some may still exist.      [unfilled]  Immunization History   Administered Date(s) Administered   • COVID-19 (MODERNA) 06/12/2021, 07/15/2021   • Flu Vaccine Intradermal Quad 18-64YR 12/14/2012   • Flu Vaccine Quad PF >36MO 12/01/2011, 11/04/2013   • Hep A / Hep B 01/14/2011   • Hepatitis B 07/12/2010, 08/31/2010   • Tdap 01/14/2011     The following portions of the patient's history were reviewed and updated as appropriate: allergies, current medications, past family history, past medical history, past social  history, past surgical history and problem list.    PHQ-9 Total Score:           Physical Exam  Constitutional:       Appearance: Normal appearance.   HENT:      Head: Normocephalic and atraumatic.      Right Ear: External ear normal.      Left Ear: External ear normal.   Eyes:      General:         Right eye: No discharge.         Left eye: No discharge.      Conjunctiva/sclera: Conjunctivae normal.   Cardiovascular:      Rate and Rhythm: Normal rate and regular rhythm.      Pulses: Normal pulses.      Heart sounds: Normal heart sounds. No murmur heard.      Pulmonary:      Effort: Pulmonary effort is normal. No respiratory distress.      Breath sounds: Normal breath sounds.   Abdominal:      General: There is no distension.      Palpations: Abdomen is soft.      Tenderness: There is no abdominal tenderness.   Musculoskeletal:      Cervical back: Normal range of motion.      Right lower leg: No edema.      Left lower leg: No edema.   Lymphadenopathy:      Cervical: No cervical adenopathy.   Neurological:      General: No focal deficit present.      Mental Status: She is alert. Mental status is at baseline.      Cranial Nerves: No cranial nerve deficit.   Psychiatric:         Mood and Affect: Mood normal.         Behavior: Behavior normal.         Assessment/Plan    Diagnosis Plan   1. Multiple sclerosis (CMS/HCC)  Ambulatory Referral to Behavioral Health   2. Primary insomnia  traZODone (DESYREL) 100 MG tablet   3. Encounter for screening mammogram for malignant neoplasm of breast  Mammo Screening Bilateral With CAD   4. Essential hypertension  dilTIAZem CD (Cartia XT) 240 MG 24 hr capsule   5. Chronic pain of right knee  Ambulatory Referral to Pain Management   6. Other chronic pain  Ambulatory Referral to Pain Management   7. Anxiety  Ambulatory Referral to Behavioral Health      Orders Placed This Encounter   Procedures   • Mammo Screening Bilateral With CAD     Standing Status:   Future     Standing Expiration  Date:   9/27/2022     Order Specific Question:   Reason for Exam:     Answer:   breast cancer screening   • Ambulatory Referral to Pain Management     Referral Priority:   Routine     Referral Type:   Pain Management     Referral Reason:   Specialty Services Required     Requested Specialty:   Pain Medicine     Number of Visits Requested:   1   • Ambulatory Referral to Behavioral Health     Referral Priority:   Routine     Referral Type:   Behavorial Health/Psych     Referral Reason:   Specialty Services Required     Requested Specialty:   Behavioral Health     Number of Visits Requested:   1     MS; improved from previous, no acute exacerbations, reassuring.  Continue current management, refer to behavioral health for counseling to assist with anxiety.    Chronic pain; no signs of abuse, misuse, José Miguel reviewed and appropriate, will refer to pain management.    Hypertension; well managed on current medications, will continue.    Follow-up in 3 months, sooner if needed.          This document has been electronically signed by Nehemiah Chow MD on November 3, 2021 16:21 CDT

## 2021-11-15 ENCOUNTER — TRANSCRIBE ORDERS (OUTPATIENT)
Dept: PHYSICAL THERAPY | Facility: HOSPITAL | Age: 46
End: 2021-11-15

## 2021-11-15 DIAGNOSIS — G35 MULTIPLE SCLEROSIS (HCC): Primary | ICD-10-CM

## 2021-11-18 ENCOUNTER — INFUSION (OUTPATIENT)
Dept: ONCOLOGY | Facility: HOSPITAL | Age: 46
End: 2021-11-18

## 2021-11-18 ENCOUNTER — HOSPITAL ENCOUNTER (OUTPATIENT)
Dept: PHYSICAL THERAPY | Facility: HOSPITAL | Age: 46
Setting detail: THERAPIES SERIES
Discharge: HOME | End: 2021-11-18

## 2021-11-18 VITALS
DIASTOLIC BLOOD PRESSURE: 82 MMHG | HEART RATE: 71 BPM | SYSTOLIC BLOOD PRESSURE: 152 MMHG | TEMPERATURE: 97.3 F | RESPIRATION RATE: 18 BRPM

## 2021-11-18 DIAGNOSIS — G35 MS (MULTIPLE SCLEROSIS) (HCC): Primary | ICD-10-CM

## 2021-11-18 DIAGNOSIS — G35 MULTIPLE SCLEROSIS (HCC): Primary | ICD-10-CM

## 2021-11-18 DIAGNOSIS — Z91.81 AT RISK FOR FALLS: ICD-10-CM

## 2021-11-18 DIAGNOSIS — R26.89 BALANCE PROBLEM: ICD-10-CM

## 2021-11-18 PROCEDURE — 25010000002 NATALIZUMAB PER 1 MG: Performed by: NURSE PRACTITIONER

## 2021-11-18 PROCEDURE — 96365 THER/PROPH/DIAG IV INF INIT: CPT | Performed by: NURSE PRACTITIONER

## 2021-11-18 PROCEDURE — 97162 PT EVAL MOD COMPLEX 30 MIN: CPT | Performed by: PHYSICAL THERAPIST

## 2021-11-18 RX ORDER — SODIUM CHLORIDE 9 MG/ML
250 INJECTION, SOLUTION INTRAVENOUS ONCE
Status: COMPLETED | OUTPATIENT
Start: 2021-11-18 | End: 2021-11-18

## 2021-11-18 RX ORDER — ONDANSETRON 2 MG/ML
4 INJECTION INTRAMUSCULAR; INTRAVENOUS AS NEEDED
Status: CANCELLED
Start: 2021-12-15

## 2021-11-18 RX ORDER — DIPHENHYDRAMINE HYDROCHLORIDE 50 MG/ML
25 INJECTION INTRAMUSCULAR; INTRAVENOUS AS NEEDED
Status: CANCELLED
Start: 2021-12-15

## 2021-11-18 RX ORDER — ONDANSETRON 4 MG/1
4 TABLET, FILM COATED ORAL ONCE
Status: DISCONTINUED | OUTPATIENT
Start: 2021-11-18 | End: 2021-11-18 | Stop reason: HOSPADM

## 2021-11-18 RX ORDER — ACETAMINOPHEN 325 MG/1
650 TABLET ORAL AS NEEDED
Status: CANCELLED
Start: 2021-12-15

## 2021-11-18 RX ORDER — ACETAMINOPHEN 325 MG/1
650 TABLET ORAL ONCE
Status: CANCELLED | OUTPATIENT
Start: 2021-12-15

## 2021-11-18 RX ORDER — LORATADINE 10 MG/1
10 TABLET ORAL DAILY
Status: DISCONTINUED | OUTPATIENT
Start: 2021-11-18 | End: 2021-11-18 | Stop reason: HOSPADM

## 2021-11-18 RX ORDER — SODIUM CHLORIDE 9 MG/ML
250 INJECTION, SOLUTION INTRAVENOUS ONCE
Status: CANCELLED | OUTPATIENT
Start: 2021-12-15

## 2021-11-18 RX ORDER — IBUPROFEN 400 MG/1
400 TABLET ORAL AS NEEDED
Status: CANCELLED
Start: 2021-12-15

## 2021-11-18 RX ORDER — ONDANSETRON HYDROCHLORIDE 4 MG/5ML
4 SOLUTION ORAL ONCE
Status: DISCONTINUED | OUTPATIENT
Start: 2021-11-18 | End: 2021-11-18 | Stop reason: CLARIF

## 2021-11-18 RX ORDER — ONDANSETRON HYDROCHLORIDE 4 MG/5ML
4 SOLUTION ORAL ONCE
Status: CANCELLED
Start: 2021-12-15 | End: 2021-12-15

## 2021-11-18 RX ORDER — ACETAMINOPHEN 325 MG/1
650 TABLET ORAL ONCE
Status: DISCONTINUED | OUTPATIENT
Start: 2021-11-18 | End: 2021-11-18 | Stop reason: HOSPADM

## 2021-11-18 RX ORDER — OXCARBAZEPINE 150 MG/1
150 TABLET, FILM COATED ORAL 2 TIMES DAILY
COMMUNITY
End: 2022-09-20

## 2021-11-18 RX ORDER — LORATADINE 10 MG/1
10 TABLET ORAL DAILY
Status: CANCELLED
Start: 2021-12-16

## 2021-11-18 RX ADMIN — SODIUM CHLORIDE 250 ML: 9 INJECTION, SOLUTION INTRAVENOUS at 14:13

## 2021-11-18 RX ADMIN — NATALIZUMAB 300 MG: 300 INJECTION INTRAVENOUS at 14:12

## 2021-11-18 NOTE — THERAPY EVALUATION
.Outpatient Physical Therapy Neuro Initial Evaluation  [facility]     Patient Name: Jenn Good  : 1975  MRN: [id number]  Today's Date: 2021      Visit Date: 2021     Patient seen for 1 PT sessions.  Patient reports N/A% of improvement.  Next MD appt: 05/10/2022.  Recertification: 2021.    Therapy Diagnosis: MS/Decreased balance and safety        Patient Active Problem List   Diagnosis   • Rash   • Gastroesophageal reflux disease   • Primary insomnia   • Hormone replacement therapy   • Hypertension   • Low back pain with right-sided sciatica   • Candida, oral   • Candida vaginitis   • Nausea and vomiting   • Acute URI   • Multiple sclerosis (HCC)   • Impacted cerumen of right ear   • Chronic low back pain   • Acute sinusitis   • Sinus headache   • Vitamin D deficiency   • Neuropathic pain   • Depression   • Hypersomnia, suspected KACIE   • Fatigue        Past Medical History:   Diagnosis Date   • Abdominal pain, right upper quadrant    • Acid reflux    • Acute maxillary sinusitis    • Acute otitis media, left    • Acute pharyngitis    • Acute sinusitis    • Acute upper respiratory infection    • Allergic rhinitis    • Anxiety    • Anxiety state    • Arthritis    • Backache    • Breast tenderness    • Bronchitis    • Callus    • Candidiasis of mouth    • Constipation    • Depression    • Dysuria    • Elevated cholesterol    • Essential hypertension    • GERD (gastroesophageal reflux disease)    • High blood pressure    • Hypertension    • Impacted cerumen    • Ingrown toenail    • Insomnia    • Local infection of the skin and subcutaneous tissue, unspecified     R external ear      • Low back pain     with radiculopathy L buttock and posterior leg      • Malaise and fatigue    • Migraine with aura    • Migraines    • MS (multiple sclerosis) (HCC)    • Multiple sclerosis (HCC)    • Palpitations    • Tinea corporis    • Vaginal discharge    • Vulvovaginitis     yeast infection         Past Surgical History:   Procedure Laterality Date   •  SECTION     • HYSTERECTOMY     • INJECTION OF MEDICATION  2016    Celestone (betamethasone) (1)      • INJECTION OF MEDICATION  05/10/2016    Rocephin (1)      • INJECTION OF MEDICATION  2015    Toradol (1)      • INJECTION OF MEDICATION  2015    Zofran (1)      • TUBAL ABDOMINAL LIGATION      Examination under anesthesia and laparoscopic tubal.   • UPPER GASTROINTESTINAL ENDOSCOPY  2015    Hiatal hernia. Gastritis. Unspecified gastric ulcer. Performed at [facility].         Visit Dx:     ICD-10-CM ICD-9-CM   1. MS (multiple sclerosis) ([facility])  G35 340   2. Balance problem  R26.89 781.99   3. At risk for falls  Z91.81 V15.88        Patient History     Row Name 21 0900             History    Chief Complaint Balance Problems  -      Date Current Problem(s) Began 11  -AJ      Brief Description of Current Complaint Patient reports she was diagnosied with MS in . She reprots she thought she was having a stroke at first. She reports she has relapsing and remitting MS. She reprots she has a MS MD. She reprots she has a preiod of a flare up where she walks like she is drunk since august. She reports she gets steroid treatment 3x/wk in [place] every 28 days for Tysabri tretments.  -AJ      Previous treatment for THIS PROBLEM Injections  Tysabri infusons every 28 days  -AJ      Patient/Caregiver Goals Improve mobility  -AJ      Patient/Caregiver Goals Comment Patient wishes to walk better and continue to walk without an assistive device.  -AJ      Current Tobacco Use None  -AJ      Smoking Status Non-smoker  -AJ      Patient's Rating of General Health Good  -AJ      Occupation/sports/leisure activities Occupation: disabled; hobbies: reading  -AJ      Patient seeing anyone else for problem(s)? Yes, MS specialist  -AJ      What clinical tests have you had for this problem? --  None recent  -AJ      Are you  "or can you be pregnant No  -AJ              Pain     Pain Location Leg  right  -AJ      Pain at Present 6  -AJ      Pain at Best 2  -AJ      Pain at Worst 8  -AJ      Pain Frequency Constant/continuous  -AJ      Pain Description Pins and needles; Tingling  previous conditino to the MS  -AJ              Fall Risk Assessment    Any falls in the past year: Yes  -AJ      Number of falls reported in the last 12 months 6  3 just in the last month  -AJ      Factors that contributed to the fall: Lost balance; Tripped  -AJ      Does patient have a fear of falling Yes (comment)  worried she will break something  -AJ            User Key  (r) = Recorded By, (t) = Taken By, (c) = Cosigned By    Initials Name Provider Type    AJ Dayanna Naqvi, PT DPT Physical Therapist                   PT Ortho     Row Name 11/18/21 0900       Subjective Comments    Subjective Comments see history  -AJ       Precautions and Contraindications    Precautions/Limitations fall precautions  -AJ       Subjective Pain    Able to rate subjective pain? yes  -AJ    Pre-Treatment Pain Level 0  -AJ    Post-Treatment Pain Level 0  -AJ       Posture/Observations    Alignment Options Forward head; Thoracic kyphosis; Lumbar lordosis; Foot pronation; Pes Planus  -AJ    Forward Head Mild; Increased  -AJ    Thoracic Kyphosis Mild; Increased  -AJ    Lumbar lordosis Normal  -AJ    Foot pronation Bilateral:; Mild; Moderate; Increased  -AJ    Pes Planus Bilateral:; Mild; Moderate; Increased  -AJ    Posture/Observations Comments No distress, no assistive device  -AJ       General ROM    GENERAL ROM COMMENTS AROm for b UE/LE/Trunk all WNL  -AJ       MMT (Manual Muscle Testing)    General MMT Comments B UE 5/5. B LE 5/5 except B hip flexion 4+/5  -AJ       Sensation    Sensation WNL? WFL  -AJ    Light Touch No apparent deficits  -AJ       Balance Skills Training    SLS unable  -AJ    Rhomberg EO: 5-8\"; EC: not tested  -AJ    Sharpened Rhomberg unable  -AJ    " "Balance Comments Standard stance EO: 20\", 15\", 20\"; EC: not tested.  -AJ       Transfers    Sit-Stand [place] (Transfers) independent  -AJ    Stand-Sit [place] (Transfers) independent  -AJ    Transfer, Safety Issues balance decreased during turns; sequencing ability decreased; knees buckling; impulsivity  -AJ    Transfer, Impairments strength decreased; coordination impaired; impaired balance; motor control impaired  -AJ    Comment (Transfers) Poor eccentric control  -AJ       Gait/Stairs (Locomotion)    [place] Level (Gait) independent; modified independence  -AJ    Assistive Device (Gait) cane, straight  After instruction within the clinic, comes in with no assistive device.  -AJ          User Key  (r) = Recorded By, (t) = Taken By, (c) = Cosigned By    Initials Name Provider Type    AJ Dayanna Naqvi PT DPT Physical Therapist               PT Neuro     Row Name 11/18/21 0900             Home Living    Current Living Arrangements home/apartment/condo  Lives with daughter  -AJ      Home Accessibility not wheelchair accessible  -AJ      Home Equipment --  None  -AJ              Vision-Basic Assessment    Current Vision Wears glasses all the time  -AJ      Patient Visual Report Balance difficulty; Blurring of vision when changing focal distance  -AJ              Cognition    Overall Cognitive Status WFL  -AJ      Arousal/Alertness Appropriate responses to stimuli  -AJ      Memory Appears intact  -AJ      Orientation Level Oriented X4  -AJ      Safety Judgment Decreased awareness of need for assistance  -AJ      Deficits Decreased awareness of deficits  -AJ              Coordination    Coordination Tests Rapid Alternating; Finger to nose eyes open; Finger to nose eyes closed; Crossing midline; Bilateral integration; Praxis/Motor planning  -AJ      Rapid Alternating Bilteral:; Impaired  -AJ      Finger to Nose Eyes Open Bilteral:; Intact  -AJ      Finger to Nose Eyes Closed Bilteral:; Intact  -AJ "      Crossing Midline Bilteral:; Intact  -AJ      Bilateral Integration Bilteral:; Impaired  -AJ      Praxis/Motor Planning Bilteral:; Impaired  -AJ              Gross Motor Coordination Training    Coordination ataxia  -AJ            User Key  (r) = Recorded By, (t) = Taken By, (c) = Cosigned By    Initials Name Provider Type    Dayanna Blackwood, PT DPT Physical Therapist                        Therapy Education  Education Details: HEP: obtain a SC for iproved safety.  Given: HEP, Fall prevention and home safety  Program: New  How Provided: Verbal, Demonstration, Written  Provided to: Patient  Level of Understanding: Verbalized, Demonstrated     PT OP Goals     Row Name 11/18/21 0900          PT Short Term Goals    STG 1 Patient I with HEP and have additions/changes by next recertification.  -     STG 2 Patient able to ambulate 75 feet with SC and no knee giving out and no LOB.  -     STG 3 Patient to obtain SC and utilize outside of home for improved safety.  -     STG 4 Patient able to perform sit to/from stand with 1 UE A = WB B LE x5, with good eccentric control in <= 15 seconds.  -     STG 5 TUG in <= 30 seconds with SC assistive device, good eccentric control and safely with no LOB.  -     STG 6 Rhomberg EO >= 10 seconds.  -     STG 7 [place] stance EO no UE support >= 30 seconds.  -            Long Term Goals    LTG 1 B LE 5/5.  -     LTG 2 Patient able to ascend/descend 3 steps reciprocally with 1 hand rail assist and SC assist x5 reps safely.  -     LTG 3 Patient able to ambulate with SC assistive device non-antalgic >= 150 feet, safely.  -     LTG 4 TUG in <= 20 seconds with SC assistive device, good eccentric control and safely with no LOB.  -     LTG 5 Patient able to perform UE limb reaching 12 inches from midline body with arm outstretched in multiple planes and across midline with no LOB.  -     LTG 6 Rhomberg EO >= 20 seconds.  -     LTG 7 Patient to be I with  final HEP.  -AJ            Time Calculation    PT Goal Re-Cert Due Date 12/13/21  -           User Key  (r) = Recorded By, (t) = Taken By, (c) = Cosigned By    Initials Name Provider Type    Dayanna Blackwood, PT DPT Physical Therapist              Barriers to Rehab: Include significant or possible arthritic/degenerative changes that have occurred within the joint/spine, neurologic degenerative condition, The chronicity of this issue, The patient's obesity.    Safety Issues: Fall risk       PT Assessment/Plan     Row Name 11/18/21 0900          PT Assessment    Functional Limitations Impaired gait; Impaired locomotion; Limitation in home management; Limitations in community activities; Performance in leisure activities; Performance in self-care ADL  -     Impairments Balance; Coordination; Endurance; Gait; Impaired flexibility; Impaired muscle endurance; Pain; Muscle strength; Locomotion  -     Assessment Comments patient is a 45yo female who presents to the clin today with significant ataxia, loss pof balance with gait and episodes of knees giving way with gait. She has significant impairements in safety, balance, coordination, and muscle endurance. performed gait training with SC after trying gait with SC, small base andlarge base QC to get the safest for the patient. Patient was instructedto obtain SC for improved overall safety with gait. No time after evaluation was available for any other HEP to be given to the patient.  -AJ     Rehab Potential Fair  -AJ     Patient/caregiver participated in establishment of treatment plan and goals Yes  -AJ     Patient would benefit from skilled therapy intervention Yes  -AJ            PT Plan    PT Frequency 2x/week  -AJ     Predicted Duration of Therapy Intervention (PT) 8-12 visits  -AJ     Planned CPT's? PT EVAL MOD COMPLELITY: 45078; PT RE-EVAL: 39159; PT THER PROC EA 15 MIN: 90190; PT THER ACT EA 15 MIN: 48164; PT MANUAL THERAPY EA 15 MIN: 16616; PT  NEUROMUSC RE-EDUCATION EA 15 MIN: 73583; PT GAIT TRAINING EA 15 MIN: 37086; PT SELF CARE/HOME MGMT/TRAIN EA 15: 91260; PT THER SUPP EA 15 MIN  -     PT Plan Comments progress overall balance, gait, safety, strength, endurance, and s/s management.  -           User Key  (r) = Recorded By, (t) = Taken By, (c) = Cosigned By    Initials Name Provider Type    Dayanna Blackwood, PT DPT Physical Therapist             Other therapeutic activities and/or exercises will be prescribed depending on the patient's progress or lack thereof.         OP Exercises     Row Name 11/18/21 0900             Subjective Comments    Subjective Comments see history  -              Subjective Pain    Able to rate subjective pain? yes  -DANIEL      Pre-Treatment Pain Level 0  -DANIEL      Post-Treatment Pain Level 0  -              Exercise 1    Exercise Name 1 Gait training with SC  -            User Key  (r) = Recorded By, (t) = Taken By, (c) = Cosigned By    Initials Name Provider Type    Dayanna Blackwood, PT DPT Physical Therapist                            Outcome Measure Options: Lower Extremity Functional Scale (LEFS), Timed Up and Go (TUG), 25 Foot Walk, 5x Sit to Stand  25 Foot Walk  Walk #1: 42 seconds  Walk #2: 40 seconds  25 Foot Walk Average Time: 41 seconds  Did the Patient Wear an AFO?: No  Was an Assistive Device Used?: Yes (comment) (SC)  5 Times Sit to Stand  5 Times Sit to Stand (seconds): 18 seconds  5 Times Sit to Stand Comments: B UE A, poor eccentric control.  Lower Extremity Functional Index  Any of your usual work, housework or school activities: Moderate difficulty  Your usual hobbies, recreational or sporting activities: Extreme difficulty or unable to perform activity  Getting into or out of the bath: Quite a bit of difficulty  Walking between rooms: Quite a bit of difficulty  Putting on your shoes or socks: Quite a bit of difficulty  Squatting: Quite a bit of difficulty  Lifting an object, like a  bag of groceries from the floor: Quite a bit of difficulty  Performing light activities around your home: Moderate difficulty  Performing heavy activities around your home: Extreme difficulty or unable to perform activity  Getting into or out of a car: Quite a bit of difficulty  Walking 2 blocks: Quite a bit of difficulty  Walking a mile: Quite a bit of difficulty  Going up or down 10 stairs (about 1 flight of stairs): Quite a bit of difficulty  Standing for 1 hour: Quite a bit of difficulty  Sitting for 1 hour: Quite a bit of difficulty  Running on even ground: Extreme difficulty or unable to perform activity  Running on uneven ground: Extreme difficulty or unable to perform activity  Making sharp turns while running fast: Extreme difficulty or unable to perform activity  Hopping: Quite a bit of difficulty  Rolling over in bed: Quite a bit of difficulty  Total: 17  Timed Up and Go (TUG)  TUG Test 1: 20 seconds (no assistive device and unsafe)  TUG Test 2: 40 seconds (With SC, safer, but still unsteady)  Timed Up and Go Comments: Utilizes b UE A sit to/from stand and poor eccentric control stand ot sit    Time Calculation:   Start Time: 0916  Stop Time: 1000  Time Calculation (min): 44 min  Total Timed Code Minutes- PT: 12 minute(s)   Therapy Charges for Today     Code Description Service Date Service Provider Modifiers Qty    43380166851 HC PT EVAL MOD COMPLEXITY 3 11/18/2021 Dayanna Naqvi, PT DPT GP 1    [id number]  PT THER SUPP EA 15 MIN 11/18/2021 Dayanna Naqvi, PT DPT GP 2          PT G-Codes  Outcome Measure Options: Lower Extremity Functional Scale (LEFS), Timed Up and Go (TUG), 25 Foot Walk, 5x Sit to Stand  Total: 17  TUG Test 1: 20 seconds (no assistive device and unsafe)  TUG Test 2: 40 seconds (With SC, safer, but still unsteady)       This document has been electronically signed by Dayanna Naqvi, PT DPT, [facility] on November 18, 2021 17:38 CST

## 2021-11-19 ENCOUNTER — APPOINTMENT (OUTPATIENT)
Dept: PHYSICAL THERAPY | Facility: HOSPITAL | Age: 46
End: 2021-11-19

## 2021-11-19 DIAGNOSIS — G35 MULTIPLE SCLEROSIS (HCC): Primary | ICD-10-CM

## 2021-11-22 ENCOUNTER — HOSPITAL ENCOUNTER (OUTPATIENT)
Dept: PHYSICAL THERAPY | Facility: HOSPITAL | Age: 46
Setting detail: THERAPIES SERIES
Discharge: HOME | End: 2021-11-22

## 2021-11-22 ENCOUNTER — TELEPHONE (OUTPATIENT)
Dept: FAMILY MEDICINE CLINIC | Facility: CLINIC | Age: 46
End: 2021-11-22

## 2021-11-22 DIAGNOSIS — G35 MS (MULTIPLE SCLEROSIS) (HCC): Primary | ICD-10-CM

## 2021-11-22 DIAGNOSIS — R26.89 BALANCE PROBLEM: ICD-10-CM

## 2021-11-22 DIAGNOSIS — Z91.81 AT RISK FOR FALLS: ICD-10-CM

## 2021-11-22 PROCEDURE — 97112 NEUROMUSCULAR REEDUCATION: CPT

## 2021-11-22 PROCEDURE — 97110 THERAPEUTIC EXERCISES: CPT

## 2021-11-22 NOTE — TELEPHONE ENCOUNTER
Received mammogram results. Dr Chow said it was negative, which is good.    Called pt and informed her of what Dr Chow said. She voiced understanding.

## 2021-11-22 NOTE — THERAPY TREATMENT NOTE
Outpatient Physical Therapy Ortho Treatment Note  [facility]     Patient Name: Jenn Good  : 1975  MRN: [id number]  Today's Date: 2021      Visit Date: 2021     Patient has attended 2 visits  N/A% Improvement  MD Visit: 05-  Recheck Date: 2021    Therapy Diagnosis: MS/Decreased Balance and safety      Visit Dx:    ICD-10-CM ICD-9-CM   1. MS (multiple sclerosis) ([facility])  G35 340   2. Balance problem  R26.89 781.99   3. At risk for falls  Z91.81 V15.88       Patient Active Problem List   Diagnosis   • Rash   • Gastroesophageal reflux disease   • Primary insomnia   • Hormone replacement therapy   • Hypertension   • Low back pain with right-sided sciatica   • Candida, oral   • Candida vaginitis   • Nausea and vomiting   • Acute URI   • Multiple sclerosis ([facility])   • Impacted cerumen of right ear   • Chronic low back pain   • Acute sinusitis   • Sinus headache   • Vitamin D deficiency   • Neuropathic pain   • Depression   • Hypersomnia, suspected KACIE   • Fatigue        Past Medical History:   Diagnosis Date   • Abdominal pain, right upper quadrant    • Acid reflux    • Acute maxillary sinusitis    • Acute otitis media, left    • Acute pharyngitis    • Acute sinusitis    • Acute upper respiratory infection    • Allergic rhinitis    • Anxiety    • Anxiety state    • Arthritis    • Backache    • Breast tenderness    • Bronchitis    • Callus    • Candidiasis of mouth    • Constipation    • Depression    • Dysuria    • Elevated cholesterol    • Essential hypertension    • GERD (gastroesophageal reflux disease)    • High blood pressure    • Hypertension    • Impacted cerumen    • Ingrown toenail    • Insomnia    • Local infection of the skin and subcutaneous tissue, unspecified     R external ear      • Low back pain     with radiculopathy L buttock and posterior leg      • Malaise and fatigue    • Migraine with aura    • Migraines    • MS (multiple sclerosis) ([facility])    • Multiple  sclerosis (H[facility])    • Palpitations    • Tinea corporis    • Vaginal discharge    • Vulvovaginitis     yeast infection        Past Surgical History:   Procedure Laterality Date   •  SECTION     • HYSTERECTOMY     • INJECTION OF MEDICATION  2016    Celestone (betamethasone) (1)      • INJECTION OF MEDICATION  05/10/2016    Rocephin (1)      • INJECTION OF MEDICATION  2015    Toradol (1)      • INJECTION OF MEDICATION  2015    Zofran (1)      • TUBAL ABDOMINAL LIGATION      Examination under anesthesia and laparoscopic tubal.   • UPPER GASTROINTESTINAL ENDOSCOPY  2015    Hiatal hernia. Gastritis. Unspecified gastric ulcer. Performed at [facility].        PT Ortho     Row Name 21 1000       Precautions and Contraindications    Precautions/Limitations fall precautions  -       Subjective Pain    Able to rate subjective pain? yes  -    Pre-Treatment Pain Level 8  headache, feels like blood pressure is high  -       Posture/Observations    Posture/Observations Comments no assistive device, ataxic gait.  -          User Key  (r) = Recorded By, (t) = Taken By, (c) = Cosigned By    Initials Name Provider Type     Lucila Araujo, PTA Physical Therapy Assistant                             PT Assessment/Plan     Row Name 21 1000          PT Assessment    Assessment Comments several incidences of loss of balance during treatment today with no assistive device. with static stance is able to correct, but with gait has incidences where clinician needed to interject for safety to help correct with patient reporting knee giving way. patient has ataxic gait throughout. poor motor planning with standing balance activities, tends to hinge on knees against chair for balance and is more challenged when stepping away from chair even when staying within arms reach. patient is educated on benefits of bringing and utilizing cane during treatment and at home for safety.  strengthening exercises are initiated today to address strength and endurance deficits. had difficulty with SLR fwd flex and hip abd with right Knee. written copies of all therex are issued for patient and patient verbalizes understanding.  -            PT Plan    PT Frequency 2x/week  -     PT Plan Comments next visit tband hip abd, static stance with narrow TAYLOR in pbars  -           User Key  (r) = Recorded By, (t) = Taken By, (c) = Cosigned By    Initials Name Provider Type     Lucila Araujo PTA Physical Therapy Assistant                   OP Exercises     Row Name 11/22/21 1000             Subjective Comments    Subjective Comments states that her head is hurting bad today. thinks that her blood pressure may be elevated today. states that she has ordered herself a cane and they will be here tomorrow  -              Subjective Pain    Able to rate subjective pain? yes  -      Pre-Treatment Pain Level 8  headache, feels like blood pressure is high  -      Post-Treatment Pain Level --  does not rate  -              Exercise 1    Exercise Name 1 Pro II UE/LE for ROM, endurance, strength  -      Time 1 10 minutes  -      Additional Comments L 5.0  -              Exercise 2    Exercise Name 2 assess BP  -              Exercise 3    Exercise Name 3 Sit to/from stand  -      Reps 3 10  -              Exercise 4    Exercise Name 4 B Standing Cone Taps  -      Reps 4 20 each  -      Additional Comments B Pole for Balance, CGA  -              Exercise 5    Exercise Name 5 B St. Reach and Touch at Midline  -      Reps 5 20 alterhating  -              Exercise 6    Exercise Name 6 Bridges  -      Reps 6 25  -      Time 6 5 sec hold  -              Exercise 7    Exercise Name 7 BKLL  -      Time 7 4 minutes alternating every 5 seconds  -              Exercise 8    Exercise Name 8 B SLR Fwd Flexion  -      Reps 8 20 each  -      Time 8 5 sec hold  -              Exercise  9    Exercise Name 9 B Sidelying Hip ABD  -      Reps 9 20 each  -              Exercise 10    Exercise Name 10 Alt LAQ  -      Time 10 alternating every 5 seconds for 4 minutes  -              Exercise 11    Exercise Name 11 Isometric Hamcurls  -      Time 11 alternating every 5 seconds  -            User Key  (r) = Recorded By, (t) = Taken By, (c) = Cosigned By    Initials Name Provider Type     Lucila Araujo PTA Physical Therapy Assistant                              PT OP Goals     Row Name 11/22/21 1000          PT Short Term Goals    STG 1 Patient I with HEP and have additions/changes by next recertification.  -     STG 2 Patient able to ambulate 75 feet with SC and no knee giving out and no LOB.  -     STG 3 Patient to obtain SC and utilize outside of home for improved safety.  -     STG 4 Patient able to perform sit to/from stand with 1 UE A = WB B LE x5, with good eccentric control in <= 15 seconds.  -     STG 5 TUG in <= 30 seconds with SC assistive device, good eccentric control and safely with no LOB.  -     STG 6 Rhomberg EO >= 10 seconds.  -     STG 7 Stanbdard stance EO no UE support >= 30 seconds.  -            Long Term Goals    LTG 1 B LE 5/5.  -     LTG 2 Patient able to ascend/descend 3 steps reciprocally with 1 hand rail assist and SC assist x5 reps safely.  -     LTG 3 Patient able to ambulate with SC assistive device non-antalgic >= 150 feet, safely.  -     LTG 4 TUG in <= 20 seconds with SC assistive device, good eccentric control and safely with no LOB.  -     LTG 5 Patient able to perform UE limb reaching 12 inches from midline body with arm outstretched in multiple planes and across midline with no LOB.  -     LTG 6 Rhomberg EO >= 20 seconds.  -     LTG 7 Patient to be I with final HEP.  -            Time Calculation    PT Goal Re-Cert Due Date 12/13/21  -           User Key  (r) = Recorded By, (t) = Taken By, (c) = Cosigned By    Initials Name  Provider Type     Lucila Araujo PTA Physical Therapy Assistant                Therapy Education  Education Details: all therex completed in therapy today issued for HEP  Given: HEP, Fall prevention and home safety  Program: New, Reinforced  How Provided: Verbal, Demonstration, Written  Provided to: Patient  Level of Understanding: Verbalized, Demonstrated              Time Calculation:   Start Time: 1002  Stop Time: 1130  Time Calculation (min): 88 min  Total Timed Code Minutes- PT: 88 minute(s)  Therapy Charges for Today     Code Description Service Date Service Provider Modifiers Qty    14790863985 HC PT THER PROC EA 15 MIN 11/22/2021 Lucila Araujo PTA GP 5    06191684120 HC PT THER SUPP EA 15 MIN 11/22/2021 Lucila Araujo PTA GP 1    80646026968 HC PT NEUROMUSC RE EDUCATION EA 15 MIN 11/22/2021 Lucila Araujo PTA GP 1                    Lucila Araujo PTA  11/22/2021       This document has been electronically signed by Lucila Araujo PTA on November 22, 2021 11:37 CST

## 2021-11-23 DIAGNOSIS — Z12.31 ENCOUNTER FOR SCREENING MAMMOGRAM FOR MALIGNANT NEOPLASM OF BREAST: ICD-10-CM

## 2021-11-29 ENCOUNTER — HOSPITAL ENCOUNTER (OUTPATIENT)
Dept: PHYSICAL THERAPY | Facility: HOSPITAL | Age: 46
Setting detail: THERAPIES SERIES
Discharge: HOME | End: 2021-11-29

## 2021-11-29 DIAGNOSIS — R26.89 BALANCE PROBLEM: ICD-10-CM

## 2021-11-29 DIAGNOSIS — G35 MS (MULTIPLE SCLEROSIS) (HCC): Primary | ICD-10-CM

## 2021-11-29 DIAGNOSIS — Z91.81 AT RISK FOR FALLS: ICD-10-CM

## 2021-11-29 PROCEDURE — 97116 GAIT TRAINING THERAPY: CPT

## 2021-11-29 PROCEDURE — 97110 THERAPEUTIC EXERCISES: CPT

## 2021-11-29 NOTE — THERAPY TREATMENT NOTE
Outpatient Physical Therapy Ortho Treatment Note  [facility]     Patient Name: Jenn Good  : 1975  MRN: [id number]  Today's Date: 2021      Visit Date: 2021     Patient has attended 3 visits  N/A% Improvement  MD Visit: 05-  Recheck Date: 2021    Therapy Diagnosis: MS/Decreased Balance and safety      Visit Dx:    ICD-10-CM ICD-9-CM   1. MS (multiple sclerosis) ([facility])  G35 340   2. Balance problem  R26.89 781.99   3. At risk for falls  Z91.81 V15.88       Patient Active Problem List   Diagnosis   • Rash   • Gastroesophageal reflux disease   • Primary insomnia   • Hormone replacement therapy   • Hypertension   • Low back pain with right-sided sciatica   • Candida, oral   • Candida vaginitis   • Nausea and vomiting   • Acute URI   • Multiple sclerosis ([facility])   • Impacted cerumen of right ear   • Chronic low back pain   • Acute sinusitis   • Sinus headache   • Vitamin D deficiency   • Neuropathic pain   • Depression   • Hypersomnia, suspected KACIE   • Fatigue        Past Medical History:   Diagnosis Date   • Abdominal pain, right upper quadrant    • Acid reflux    • Acute maxillary sinusitis    • Acute otitis media, left    • Acute pharyngitis    • Acute sinusitis    • Acute upper respiratory infection    • Allergic rhinitis    • Anxiety    • Anxiety state    • Arthritis    • Backache    • Breast tenderness    • Bronchitis    • Callus    • Candidiasis of mouth    • Constipation    • Depression    • Dysuria    • Elevated cholesterol    • Essential hypertension    • GERD (gastroesophageal reflux disease)    • High blood pressure    • Hypertension    • Impacted cerumen    • Ingrown toenail    • Insomnia    • Local infection of the skin and subcutaneous tissue, unspecified     R external ear      • Low back pain     with radiculopathy L buttock and posterior leg      • Malaise and fatigue    • Migraine with aura    • Migraines    • MS (multiple sclerosis) ([facility])    • Multiple  sclerosis ([facility])    • Palpitations    • Tinea corporis    • Vaginal discharge    • Vulvovaginitis     yeast infection        Past Surgical History:   Procedure Laterality Date   •  SECTION     • HYSTERECTOMY     • INJECTION OF MEDICATION  2016    Celestone (betamethasone) (1)      • INJECTION OF MEDICATION  05/10/2016    Rocephin (1)      • INJECTION OF MEDICATION  2015    Toradol (1)      • INJECTION OF MEDICATION  2015    Zofran (1)      • TUBAL ABDOMINAL LIGATION      Examination under anesthesia and laparoscopic tubal.   • UPPER GASTROINTESTINAL ENDOSCOPY  2015    Hiatal hernia. Gastritis. Unspecified gastric ulcer. Performed at [facility].        PT Ortho     Row Name 21 1100       Precautions and Contraindications    Precautions/Limitations fall precautions  -       Subjective Pain    Able to rate subjective pain? yes  -    Pre-Treatment Pain Level 6  -          User Key  (r) = Recorded By, (t) = Taken By, (c) = Cosigned By    Initials Name Provider Type     Lucila Araujo PTA Physical Therapy Assistant                             PT Assessment/Plan     Row Name 21 1100          PT Assessment    Assessment Comments patient requires max cueing for gait training with standard cane. needs cueing for arm swing with gait, appropriate timing with cane and step length remaining consistent. patient has incidences of right knee giving way during treatment but self corrects with cane and clinician on stand by assist. patient needs multiple instructions for therex. Has poor overall motor planning  -            PT Plan    PT Frequency 2x/week  -     PT Plan Comments would benefit from tasks with fewer steps to complete and working toward more complex steps to improve motor planning. next visit stand at table with reaching activities  -           User Key  (r) = Recorded By, (t) = Taken By, (c) = Cosigned By    Initials Name Provider Type    DENISE Araujo  Lucila MEDRANO PTA Physical Therapy Assistant                   OP Exercises     Row Name 11/29/21 1100             Subjective Comments    Subjective Comments states that she is stiff and sore today.  -              Subjective Pain    Able to rate subjective pain? yes  -      Pre-Treatment Pain Level 6  -MH      Post-Treatment Pain Level 6  -              Exercise 1    Exercise Name 1 Pro II UE/LE for ROM, endurance, strength  -      Time 1 10 minutes  -      Additional Comments L 5.0  -              Exercise 2    Exercise Name 2 sit to/from stand  -      Cueing 2 Verbal  -              Exercise 3    Exercise Name 3 Gait training with Standard Cane  -              Exercise 4    Exercise Name 4 Alt LAQ  -      Time 4 5 sec hold repeating/alternating for 4 minutes  -              Exercise 5    Exercise Name 5 Seated Marching  -      Time 5 5 sec hold alternating for 3 minutes  -              Exercise 6    Exercise Name 6 NBOS in Pbars  -              Exercise 7    Exercise Name 7 Standing Isometric Transab  -      Reps 7 20  -MH      Time 7 5 sec hold  -              Exercise 8    Exercise Name 8 Standing Hip ABD  -      Reps 8 20 each LE  -              Exercise 9    Exercise Name 9 Standing HR/TR  -MH      Reps 9 20  -              Exercise 10    Exercise Name 10 LTR  -      Time 10 10 sec hold  -              Exercise 11    Exercise Name 11 SLR Fwd Flexion  -      Reps 11 20 each  -              Exercise 12    Exercise Name 12 Bridges  -MH      Reps 12 20  -MH      Time 12 5 sec hold  -MH            User Key  (r) = Recorded By, (t) = Taken By, (c) = Cosigned By    Initials Name Provider Type    Lucila Keita PTA Physical Therapy Assistant                              PT OP Goals     Row Name 11/29/21 1100          PT Short Term Goals    STG 1 Patient I with HEP and have additions/changes by next recertification.  -     STG 1 Progress Met  -     STG 2 Patient able to  ambulate 75 feet with SC and no knee giving out and no LOB.  -     STG 3 Patient to obtain SC and utilize outside of home for improved safety.  -     STG 3 Progress Partially Met  -     STG 3 Progress Comments has obtained and is using but working on appropriate use during treatment  -     STG 4 Patient able to perform sit to/from stand with 1 UE A = WB B LE x5, with good eccentric control in <= 15 seconds.  -     STG 5 TUG in <= 30 seconds with SC assistive device, good eccentric control and safely with no LOB.  -     STG 6 Rhomberg EO >= 10 seconds.  -     STG 7 Stanbdard stance EO no UE support >= 30 seconds.  -            Long Term Goals    LTG 1 B LE 5/5.  -     LTG 2 Patient able to ascend/descend 3 steps reciprocally with 1 hand rail assist and SC assist x5 reps safely.  -     LTG 3 Patient able to ambulate with SC assistive device non-antalgic >= 150 feet, safely.  -     LTG 4 TUG in <= 20 seconds with SC assistive device, good eccentric control and safely with no LOB.  -     LTG 5 Patient able to perform UE limb reaching 12 inches from midline body with arm outstretched in multiple planes and across midline with no LOB.  -     LTG 6 Rhomberg EO >= 20 seconds.  -     LTG 7 Patient to be I with final HEP.  -            Time Calculation    PT Goal Re-Cert Due Date 12/13/21  -           User Key  (r) = Recorded By, (t) = Taken By, (c) = Cosigned By    Initials Name Provider Type     Lucila Araujo PTA Physical Therapy Assistant                Therapy Education  Given: HEP, Fall prevention and home safety  Program: Reinforced  How Provided: Verbal, Demonstration  Provided to: Patient  Level of Understanding: Teach back education performed, Verbalized, Demonstrated              Time Calculation:   Start Time: 1055  Stop Time: 1200  Time Calculation (min): 65 min  Total Timed Code Minutes- PT: 65 minute(s)  Therapy Charges for Today     Code Description Service Date Service  Provider Modifiers Qty    18127560570 HC PT THER PROC EA 15 MIN 11/29/2021 Lucila Araujo PTA GP 3    53700459004 HC PT THER SUPP EA 15 MIN 11/29/2021 Lucila Araujo PTA GP 1    27509975181 HC GAIT TRAINING EA 15 MIN 11/29/2021 Lucila Araujo PTA GP 1                    Lucila Araujo PTA  11/29/2021       This document has been electronically signed by Lucila Araujo PTA on November 29, 2021 12:07 CST

## 2021-12-01 ENCOUNTER — APPOINTMENT (OUTPATIENT)
Dept: PHYSICAL THERAPY | Facility: HOSPITAL | Age: 46
End: 2021-12-01

## 2021-12-02 DIAGNOSIS — M25.561 CHRONIC PAIN OF RIGHT KNEE: ICD-10-CM

## 2021-12-02 DIAGNOSIS — G89.29 OTHER CHRONIC PAIN: ICD-10-CM

## 2021-12-02 DIAGNOSIS — G89.29 CHRONIC PAIN OF RIGHT KNEE: ICD-10-CM

## 2021-12-03 ENCOUNTER — HOSPITAL ENCOUNTER (OUTPATIENT)
Dept: PHYSICAL THERAPY | Facility: HOSPITAL | Age: 46
Setting detail: THERAPIES SERIES
Discharge: HOME | End: 2021-12-03

## 2021-12-03 DIAGNOSIS — G35 MS (MULTIPLE SCLEROSIS) (HCC): Primary | ICD-10-CM

## 2021-12-03 DIAGNOSIS — R26.89 BALANCE PROBLEM: ICD-10-CM

## 2021-12-03 DIAGNOSIS — Z91.81 AT RISK FOR FALLS: ICD-10-CM

## 2021-12-03 PROCEDURE — 97110 THERAPEUTIC EXERCISES: CPT

## 2021-12-03 PROCEDURE — 97112 NEUROMUSCULAR REEDUCATION: CPT

## 2021-12-03 PROCEDURE — 97116 GAIT TRAINING THERAPY: CPT

## 2021-12-03 RX ORDER — HYDROCODONE BITARTRATE AND ACETAMINOPHEN 7.5; 325 MG/1; MG/1
1 TABLET ORAL EVERY 8 HOURS PRN
Qty: 90 TABLET | Refills: 0 | Status: SHIPPED | OUTPATIENT
Start: 2021-12-03 | End: 2021-12-28 | Stop reason: SDUPTHER

## 2021-12-03 NOTE — THERAPY TREATMENT NOTE
"    Outpatient Physical Therapy Ortho Treatment Note  [facility]     Patient Name: Jenn Good  : 1975  MRN: [id number]  Today's Date: 12/3/2021      Visit Date: 2021     Patient has attended 4 visits  \"yes\"% Improvement  MD Visit: 05-  Recheck Date: 2021    Therapy Diagnosis: MS/Decreased Balance and Safety      Visit Dx:    ICD-10-CM ICD-9-CM   1. MS (multiple sclerosis) ([facility])  G35 340   2. Balance problem  R26.89 781.99   3. At risk for falls  Z91.81 V15.88       Patient Active Problem List   Diagnosis   • Rash   • Gastroesophageal reflux disease   • Primary insomnia   • Hormone replacement therapy   • Hypertension   • Low back pain with right-sided sciatica   • Candida, oral   • Candida vaginitis   • Nausea and vomiting   • Acute URI   • Multiple sclerosis ([facility])   • Impacted cerumen of right ear   • Chronic low back pain   • Acute sinusitis   • Sinus headache   • Vitamin D deficiency   • Neuropathic pain   • Depression   • Hypersomnia, suspected KACIE   • Fatigue        Past Medical History:   Diagnosis Date   • Abdominal pain, right upper quadrant    • Acid reflux    • Acute maxillary sinusitis    • Acute otitis media, left    • Acute pharyngitis    • Acute sinusitis    • Acute upper respiratory infection    • Allergic rhinitis    • Anxiety    • Anxiety state    • Arthritis    • Backache    • Breast tenderness    • Bronchitis    • Callus    • Candidiasis of mouth    • Constipation    • Depression    • Dysuria    • Elevated cholesterol    • Essential hypertension    • GERD (gastroesophageal reflux disease)    • High blood pressure    • Hypertension    • Impacted cerumen    • Ingrown toenail    • Insomnia    • Local infection of the skin and subcutaneous tissue, unspecified     R external ear      • Low back pain     with radiculopathy L buttock and posterior leg      • Malaise and fatigue    • Migraine with aura    • Migraines    • MS (multiple sclerosis) ([facility])    • Multiple " sclerosis (HCC)    • Palpitations    • Tinea corporis    • Vaginal discharge    • Vulvovaginitis     yeast infection        Past Surgical History:   Procedure Laterality Date   •  SECTION     • HYSTERECTOMY     • INJECTION OF MEDICATION  2016    Celestone (betamethasone) (1)      • INJECTION OF MEDICATION  05/10/2016    Rocephin (1)      • INJECTION OF MEDICATION  2015    Toradol (1)      • INJECTION OF MEDICATION  2015    Zofran (1)      • TUBAL ABDOMINAL LIGATION      Examination under anesthesia and laparoscopic tubal.   • UPPER GASTROINTESTINAL ENDOSCOPY  2015    Hiatal hernia. Gastritis. Unspecified gastric ulcer. Performed at [facility].        PT Ortho     Row Name 21 1000       Subjective Comments    Subjective Comments states that she is feeling good today and that it is a good day.  -       Precautions and Contraindications    Precautions/Limitations fall precautions  -       Subjective Pain    Able to rate subjective pain? yes  -    Pre-Treatment Pain Level 0  -       Posture/Observations    Posture/Observations Comments ambulates in carrying standard cane with very little placement of cane to floor.  -          User Key  (r) = Recorded By, (t) = Taken By, (c) = Cosigned By    Initials Name Provider Type     Lucila Araujo, PTA Physical Therapy Assistant                             PT Assessment/Plan     Row Name 21 1000          PT Assessment    Assessment Comments time is spent with gait training today to facilitate improved use of cane and to get patient using it in proper sequence. cues for step length, arm swing, enrique and placement of cane throughout. patient has no loss of balance or knee giving way during gait training today. worked on balance activities today with reacing across midline and away from midline as well as away from the body straight ahead. Worked on various surfaces to challenge balance and facilitate self  correction techniques. patient is challenged but is receptive to cueing for balance strategies. Patient often has to touch down with UE to steady herself when on a challenged surface. HEP is progressed with red theraband for resistance as well as adding noted exericses to HEP. discussed using music or a metronome with exercises to help keep a rhythm and pace as with seated heel raises and toe raises she had difficulty keeping it going. Improved once metronome and music was introduced and was able to keep consistent movement with good quality.  -            PT Plan    PT Frequency 2x/week  -     PT Plan Comments continue working on balance activities as well as strengthening, may add step ups next visit.  -           User Key  (r) = Recorded By, (t) = Taken By, (c) = Cosigned By    Initials Name Provider Type     Lucial Araujo PTA Physical Therapy Assistant                   OP Exercises     Row Name 12/03/21 1000             Subjective Comments    Subjective Comments states that she is feeling good today and that it is a good day.  -              Subjective Pain    Able to rate subjective pain? yes  -      Pre-Treatment Pain Level 0  -              Exercise 1    Exercise Name 1 Gait Training with standard Cane  -      Time 1 10 minutes  -      Additional Comments cueing for arm swing, cane placement, step length  -              Exercise 2    Exercise Name 2 Pro II UE/LE for endurance, strength  -      Time 2 10 minutes  -      Additional Comments L 6.0  -              Exercise 3    Exercise Name 3 Reaching Activity: Dealing cards across the table  -      Additional Comments Level Surface  -              Exercise 4    Exercise Name 4 Reaching Activity: Dealing cards across the table  -      Additional Comments one foot on Susan Disc/one foot on floor (completes Bilaterally)  -              Exercise 5    Exercise Name 5 Reaching Activity: Dealing cards across the table  -       Additional Comments B Feet on Airx Foam  -              Exercise 6    Exercise Name 6 Reaching Across Midline and Away from Midline: Flip Red/Black Disc about 2 feet from body on table  -      Additional Comments Level Surface  -              Exercise 7    Exercise Name 7 Reaching Across Midline and Away from Midline: Flip Red/Black Disc about 2 feet from body on table  -      Additional Comments B Feet on Airx  -              Exercise 8    Exercise Name 8 Reaching Across Midline and Away from Midline: Flip Red/Black Disc about 2 feet from body on table  -      Additional Comments one foot on Susan Disc/one foot on floor (completes Bilaterally)  -              Exercise 9    Exercise Name 9 Sit to/from stand  -      Reps 9 20  -              Exercise 10    Exercise Name 10 Seated Marching with Tband resist  -      Time 10 3 minutes  -              Exercise 11    Exercise Name 11 Seated Tband Hip ABD  -      Reps 11 3 minutes  -      Additional Comments Red  -              Exercise 12    Exercise Name 12 Seated Hip ADD squeeze  -      Time 12 3 minutes  -              Exercise 13    Exercise Name 13 Seated Toe Taps  -      Time 13 3 minutes  -              Exercise 14    Exercise Name 14 Seated Heel Raises  -      Time 14 3 minutes  -            User Key  (r) = Recorded By, (t) = Taken By, (c) = Cosigned By    Initials Name Provider Type     Lucila Araujo, PTA Physical Therapy Assistant                              PT OP Goals     Row Name 12/03/21 1000          PT Short Term Goals    STG 1 Patient I with HEP and have additions/changes by next recertification.  -     STG 1 Progress Met  [facility]     STG 2 Patient able to ambulate 75 feet with SC and no knee giving out and no LOB.  -     STG 3 Patient to obtain SC and utilize outside of home for improved safety.  -     STG 3 Progress Partially Met  -     STG 4 Patient able to perform sit to/from stand with 1 UE A = WB B LE  x5, with good eccentric control in <= 15 seconds.  -     STG 5 TUG in <= 30 seconds with SC assistive device, good eccentric control and safely with no LOB.  -     STG 6 Rhomberg EO >= 10 seconds.  -     STG 7 Stanbdard stance EO no UE support >= 30 seconds.  -            Long Term Goals    LTG 1 B LE 5/5.  -     LTG 2 Patient able to ascend/descend 3 steps reciprocally with 1 hand rail assist and SC assist x5 reps safely.  -     LTG 3 Patient able to ambulate with SC assistive device non-antalgic >= 150 feet, safely.  -     LTG 4 TUG in <= 20 seconds with SC assistive device, good eccentric control and safely with no LOB.  -     LTG 5 Patient able to perform UE limb reaching 12 inches from midline body with arm outstretched in multiple planes and across midline with no LOB.  -     LTG 6 Rhomberg EO >= 20 seconds.  -     LTG 7 Patient to be I with final HEP.  -            Time Calculation    PT Goal Re-Cert Due Date 12/13/21  -           User Key  (r) = Recorded By, (t) = Taken By, (c) = Cosigned By    Initials Name Provider Type     Lucila Araujo PTA Physical Therapy Assistant                Therapy Education  Education Details: Red tband for seated marching, Tband Hip ABD seated or supine, seated add squeezes  Given: HEP, Fall prevention and home safety  Program: Reinforced  How Provided: Verbal, Demonstration  Provided to: Patient  Level of Understanding: Teach back education performed, Verbalized, Demonstrated              Time Calculation:   Start Time: 1005  Stop Time: 1120  Time Calculation (min): 75 min  Total Timed Code Minutes- PT: 75 minute(s)  Therapy Charges for Today     Code Description Service Date Service Provider Modifiers Qty    61701680134 HC PT NEUROMUSC RE EDUCATION EA 15 MIN 12/3/2021 Lucila Araujo, DENISE GP 2    25862031180 HC PT THER SUPP EA 15 MIN 12/3/2021 Lucila Araujo, PTA GP 1    72923900790 HC GAIT TRAINING EA 15 MIN 12/3/2021 Lucila Araujo PTA GP 1     [id number]  PT THER PROC EA 15 MIN 12/3/2021 Lucila Araujo PTA  2                    Lucila Araujo PTA  12/3/2021       This document has been electronically signed by Lucila Araujo PTA on December 3, 2021 11:21 CST

## 2021-12-03 NOTE — TELEPHONE ENCOUNTER
Rx Refill Note  Requested Prescriptions     Pending Prescriptions Disp Refills   • HYDROcodone-acetaminophen (NORCO) 7.5-325 MG per tablet 90 tablet 0     Sig: Take 1 tablet by mouth Every 8 (Eight) Hours As Needed for Moderate Pain .      Last office visit with prescribing clinician: 9/27/2021      Next office visit with prescribing clinician: 12/27/2021            Beto Riley Rep  12/03/21, 11:48 CST

## 2021-12-06 ENCOUNTER — TELEPHONE (OUTPATIENT)
Dept: PHYSICAL THERAPY | Facility: HOSPITAL | Age: 46
End: 2021-12-06

## 2021-12-06 NOTE — TELEPHONE ENCOUNTER
attempted to call secondary to no show for appointment. no answer. voicemail with reminder of next appointment left for patient.

## 2021-12-08 ENCOUNTER — HOSPITAL ENCOUNTER (OUTPATIENT)
Dept: PHYSICAL THERAPY | Facility: HOSPITAL | Age: 46
Setting detail: THERAPIES SERIES
Discharge: HOME | End: 2021-12-08

## 2021-12-08 DIAGNOSIS — R26.89 BALANCE PROBLEM: ICD-10-CM

## 2021-12-08 DIAGNOSIS — G35 MS (MULTIPLE SCLEROSIS) (HCC): Primary | ICD-10-CM

## 2021-12-08 DIAGNOSIS — Z91.81 AT RISK FOR FALLS: ICD-10-CM

## 2021-12-08 PROCEDURE — 97110 THERAPEUTIC EXERCISES: CPT

## 2021-12-08 PROCEDURE — 97116 GAIT TRAINING THERAPY: CPT

## 2021-12-08 NOTE — THERAPY TREATMENT NOTE
Outpatient Physical Therapy Ortho Treatment Note  [facility]     Patient Name: Jenn Good  : 1975  MRN: [id number]  Today's Date: 2021     Pt seen for 5 PT sessions  Reported Improvement:  35 %  MD Visit: 05/10/2021  Recheck Date: 2021    Therapy Diagnosis:  MS/Decreased Balance and Safety        Visit Date: 2021    Visit Dx:    ICD-10-CM ICD-9-CM   1. MS (multiple sclerosis) ([facility])  G35 340   2. Balance problem  R26.89 781.99   3. At risk for falls  Z91.81 V15.88       Patient Active Problem List   Diagnosis   • Rash   • Gastroesophageal reflux disease   • Primary insomnia   • Hormone replacement therapy   • Hypertension   • Low back pain with right-sided sciatica   • Candida, oral   • Candida vaginitis   • Nausea and vomiting   • Acute URI   • Multiple sclerosis ([facility])   • Impacted cerumen of right ear   • Chronic low back pain   • Acute sinusitis   • Sinus headache   • Vitamin D deficiency   • Neuropathic pain   • Depression   • Hypersomnia, suspected KACIE   • Fatigue        Past Medical History:   Diagnosis Date   • Abdominal pain, right upper quadrant    • Acid reflux    • Acute maxillary sinusitis    • Acute otitis media, left    • Acute pharyngitis    • Acute sinusitis    • Acute upper respiratory infection    • Allergic rhinitis    • Anxiety    • Anxiety state    • Arthritis    • Backache    • Breast tenderness    • Bronchitis    • Callus    • Candidiasis of mouth    • Constipation    • Depression    • Dysuria    • Elevated cholesterol    • Essential hypertension    • GERD (gastroesophageal reflux disease)    • High blood pressure    • Hypertension    • Impacted cerumen    • Ingrown toenail    • Insomnia    • Local infection of the skin and subcutaneous tissue, unspecified     R external ear      • Low back pain     with radiculopathy L buttock and posterior leg      • Malaise and fatigue    • Migraine with aura    • Migraines    • MS (multiple sclerosis) ([facility])    •  Multiple sclerosis (HCC)    • Palpitations    • Tinea corporis    • Vaginal discharge    • Vulvovaginitis     yeast infection        Past Surgical History:   Procedure Laterality Date   •  SECTION     • HYSTERECTOMY     • INJECTION OF MEDICATION  2016    Celestone (betamethasone) (1)      • INJECTION OF MEDICATION  05/10/2016    Rocephin (1)      • INJECTION OF MEDICATION  2015    Toradol (1)      • INJECTION OF MEDICATION  2015    Zofran (1)      • TUBAL ABDOMINAL LIGATION      Examination under anesthesia and laparoscopic tubal.   • UPPER GASTROINTESTINAL ENDOSCOPY  2015    Hiatal hernia. Gastritis. Unspecified gastric ulcer. Performed at [facility].        PT Ortho     Row Name 21 1100       Subjective Comments    Subjective Comments Pt reports feeling tire.  No pain no signifiacnt changes since last visit.  -       Precautions and Contraindications    Precautions/Limitations fall precautions  -       Subjective Pain    Able to rate subjective pain? yes  -    Pre-Treatment Pain Level 0  -          User Key  (r) = Recorded By, (t) = Taken By, (c) = Cosigned By    Initials Name Provider Type    Minerva Ro PTA Physical Therapy Assistant                             PT Assessment/Plan     Row Name 21 1100          PT Assessment    Assessment Comments Pt gives good effort.  Improved gait pattern and recalls proper sequencing with gait training. Pt demonstrates ability to recognize incorrect gait pattern adn stop to correct.  Pt has multiple LOB but able to maintain balance.  Pt fatigues with therex.  -            PT Plan    PT Frequency 2x/week  -     PT Plan Comments add step ups  -           User Key  (r) = Recorded By, (t) = Taken By, (c) = Cosigned By    Initials Name Provider Type    Minerva Ro PTA Physical Therapy Assistant                   OP Exercises     Row Name 21 1100             Subjective Comments    Subjective  Comments Pt reports feeling tire.  No pain no signifiacnt changes since last visit.  -JW              Subjective Pain    Able to rate subjective pain? yes  -JW      Pre-Treatment Pain Level 0  -JW      Post-Treatment Pain Level 0  -JW              Exercise 1    Exercise Name 1 Pro II UE/LE bike for mm enudurance, strength and posture  -JW      Time 1 10 minutes  -JW      Additional Comments Lv 5.0  -JW              Exercise 2    Exercise Name 2 Sit to stands  -JW      Cueing 2 Verbal  -JW      Reps 2 2  -JW      Time 2 10  -JW      Additional Comments SBA for safety and balance  -JW              Exercise 3    Exercise Name 3 Seated with DD Marching  -JW      Cueing 3 Verbal  -JW      Time 3 3 min  -JW      Additional Comments BUE support as needed  -              Exercise 4    Exercise Name 4 Seated UE chest press - shouler press  -JW      Cueing 4 Verbal; Demo  -JW      Sets 4 2  -JW      Reps 4 10  -JW      Additional Comments 2# tbar  -              Exercise 5    Exercise Name 5 gait training with SC  -            User Key  (r) = Recorded By, (t) = Taken By, (c) = Cosigned By    Initials Name Provider Type    Minerva Ro, PTA Physical Therapy Assistant                              PT OP Goals     Row Name 12/08/21 1100          PT Short Term Goals    STG 1 Patient I with HEP and have additions/changes by next recertification.  -     STG 1 Progress Met  -     STG 2 Patient able to ambulate 75 feet with SC and no knee giving out and no LOB.  -     STG 3 Patient to obtain SC and utilize outside of home for improved safety.  -     STG 3 Progress Partially Met  -     STG 4 Patient able to perform sit to/from stand with 1 UE A = WB B LE x5, with good eccentric control in <= 15 seconds.  -     STG 5 TUG in <= 30 seconds with SC assistive device, good eccentric control and safely with no LOB.  -     STG 6 Rhomberg EO >= 10 seconds.  -     STG 7 Stanbdard stance EO no UE support >= 30 seconds.   -            Long Term Goals    LTG 1 B LE 5/5.  -     LTG 2 Patient able to ascend/descend 3 steps reciprocally with 1 hand rail assist and SC assist x5 reps safely.  -     LTG 3 Patient able to ambulate with SC assistive device non-antalgic >= 150 feet, safely.  -     LTG 4 TUG in <= 20 seconds with SC assistive device, good eccentric control and safely with no LOB.  -     LTG 5 Patient able to perform UE limb reaching 12 inches from midline body with arm outstretched in multiple planes and across midline with no LOB.  -     LTG 6 Rhomberg EO >= 20 seconds.  -     LTG 7 Patient to be I with final HEP.  -            Time Calculation    PT Goal Re-Cert Due Date 12/13/21  -           User Key  (r) = Recorded By, (t) = Taken By, (c) = Cosigned By    Initials Name Provider Type    Minerva Ro PTA Physical Therapy Assistant                Therapy Education  Given: Fall prevention and home safety, Mobility training, HEP  Program: Reinforced  How Provided: Verbal, Demonstration  Provided to: Patient  Level of Understanding: Teach back education performed, Verbalized              Time Calculation:      Therapy Charges for Today     Code Description Service Date Service Provider Modifiers Qty    39317583752 HC GAIT TRAINING EA 15 MIN 12/8/2021 Minerva Nagel PTA GP 1    84654626167 HC PT THER PROC EA 15 MIN 12/8/2021 Minerva Nagel PTA GP 2    43285639877 HC PT THER SUPP EA 15 MIN 12/8/2021 Minerva Nagel PTA GP 1                    Minerva Nagel PTA  12/8/2021

## 2021-12-13 ENCOUNTER — HOSPITAL ENCOUNTER (OUTPATIENT)
Dept: PHYSICAL THERAPY | Facility: HOSPITAL | Age: 46
Setting detail: THERAPIES SERIES
Discharge: HOME | End: 2021-12-13

## 2021-12-13 DIAGNOSIS — G35 MS (MULTIPLE SCLEROSIS) (HCC): Primary | ICD-10-CM

## 2021-12-13 DIAGNOSIS — M72.2 PLANTAR FASCIITIS: ICD-10-CM

## 2021-12-13 DIAGNOSIS — Z91.81 AT RISK FOR FALLS: ICD-10-CM

## 2021-12-13 DIAGNOSIS — R26.89 BALANCE PROBLEM: ICD-10-CM

## 2021-12-13 PROCEDURE — 97112 NEUROMUSCULAR REEDUCATION: CPT

## 2021-12-13 PROCEDURE — 97110 THERAPEUTIC EXERCISES: CPT

## 2021-12-13 PROCEDURE — 97116 GAIT TRAINING THERAPY: CPT

## 2021-12-13 NOTE — THERAPY TREATMENT NOTE
Outpatient Physical Therapy Ortho Treatment Note  [facility]     Patient Name: Jenn Good  : 1975  MRN: [id number]  Today's Date: 2021      Visit Date: 2021     Patient has attended 6 visits  35% Improvement  MD Visit: 05-  Recheck Date: 2021    Therapy Diagnosis: MS/Decreased Balance and Safety      Visit Dx:    ICD-10-CM ICD-9-CM   1. MS (multiple sclerosis) (HCC)  G35 340   2. Balance problem  R26.89 781.99   3. At risk for falls  Z91.81 V15.88   4. Plantar fasciitis  M72.2 728.71       Patient Active Problem List   Diagnosis   • Rash   • Gastroesophageal reflux disease   • Primary insomnia   • Hormone replacement therapy   • Hypertension   • Low back pain with right-sided sciatica   • Candida, oral   • Candida vaginitis   • Nausea and vomiting   • Acute URI   • Multiple sclerosis (HCC)   • Impacted cerumen of right ear   • Chronic low back pain   • Acute sinusitis   • Sinus headache   • Vitamin D deficiency   • Neuropathic pain   • Depression   • Hypersomnia, suspected KACIE   • Fatigue        Past Medical History:   Diagnosis Date   • Abdominal pain, right upper quadrant    • Acid reflux    • Acute maxillary sinusitis    • Acute otitis media, left    • Acute pharyngitis    • Acute sinusitis    • Acute upper respiratory infection    • Allergic rhinitis    • Anxiety    • Anxiety state    • Arthritis    • Backache    • Breast tenderness    • Bronchitis    • Callus    • Candidiasis of mouth    • Constipation    • Depression    • Dysuria    • Elevated cholesterol    • Essential hypertension    • GERD (gastroesophageal reflux disease)    • High blood pressure    • Hypertension    • Impacted cerumen    • Ingrown toenail    • Insomnia    • Local infection of the skin and subcutaneous tissue, unspecified     R external ear      • Low back pain     with radiculopathy L buttock and posterior leg      • Malaise and fatigue    • Migraine with aura    • Migraines    • MS (multiple  sclerosis) (HCC)    • Multiple sclerosis (HCC)    • Palpitations    • Tinea corporis    • Vaginal discharge    • Vulvovaginitis     yeast infection        Past Surgical History:   Procedure Laterality Date   •  SECTION     • HYSTERECTOMY     • INJECTION OF MEDICATION  2016    Celestone (betamethasone) (1)      • INJECTION OF MEDICATION  05/10/2016    Rocephin (1)      • INJECTION OF MEDICATION  2015    Toradol (1)      • INJECTION OF MEDICATION  2015    Zofran (1)      • TUBAL ABDOMINAL LIGATION      Examination under anesthesia and laparoscopic tubal.   • UPPER GASTROINTESTINAL ENDOSCOPY  2015    Hiatal hernia. Gastritis. Unspecified gastric ulcer. Performed at [facility].        PT Ortho     Row Name 21 1100       Precautions and Contraindications    Precautions/Limitations fall precautions  -       Subjective Pain    Able to rate subjective pain? yes  -    Pre-Treatment Pain Level 6  -          User Key  (r) = Recorded By, (t) = Taken By, (c) = Cosigned By    Initials Name Provider Type     Lucila Araujo, PTA Physical Therapy Assistant                             PT Assessment/Plan     Row Name 21 1100          PT Assessment    Assessment Comments patient is challenged when she has to complete reaching task across midline of her body and away from Center of gravity. does well when reaching away from midline of body with no balance issues noted. no difficulty with step ups fwd or lateral step up and over B. does have a loss of balance iwht ball toss that patient is able to self recover from with use of Pbars. patient has no loss of balance with approximately 150 feet of gait or knee buckling until she hits about 160 feet of gait.  -            PT Plan    PT Frequency 2x/week  -     PT Plan Comments next visit increase step height  -           User Key  (r) = Recorded By, (t) = Taken By, (c) = Cosigned By    Initials Name Provider Type      Lucila Araujo PTA Physical Therapy Assistant                   OP Exercises     Row Name 12/13/21 1100             Subjective Comments    Subjective Comments states that her face is hurting on the right side. states that the doctor equivelated it to almost like hving seizures on that side of her face. states that its been a while since she has had a fall  -              Subjective Pain    Able to rate subjective pain? yes  -      Pre-Treatment Pain Level 6  -      Post-Treatment Pain Level 6  -              Exercise 1    Exercise Name 1 Pro II UE/LE bike for mm enudurance, strength and posture  -      Time 1 10 minutes  -      Additional Comments L 8.0  -              Exercise 2    Exercise Name 2 Gait training with standard cane  -              Exercise 3    Exercise Name 3 Pbars: Airx B UE reach Clothespin Ty/Doff B  -              Exercise 4    Exercise Name 4 Pbar: Ball Toss  -              Exercise 5    Exercise Name 5 B Step Up Fwd  -      Reps 5 20 each  -      Additional Comments 4 inch step  -              Exercise 6    Exercise Name 6 B Lat Step Up and Over  -      Reps 6 20  -      Time 6 5 sec hold  -            User Key  (r) = Recorded By, (t) = Taken By, (c) = Cosigned By    Initials Name Provider Type     Lucila Araujo PTA Physical Therapy Assistant                              PT OP Goals     Row Name 12/13/21 1100          PT Short Term Goals    STG 1 Patient I with HEP and have additions/changes by next recertification.  -     STG 1 Progress Met  [facility]     STG 2 Patient able to ambulate 75 feet with SC and no knee giving out and no LOB.  -     STG 2 Progress Met  [facility]     STG 3 Patient to obtain SC and utilize outside of home for improved safety.  -     STG 3 Progress Partially Met  -     STG 4 Patient able to perform sit to/from stand with 1 UE A = WB B LE x5, with good eccentric control in <= 15 seconds.  -     STG 5 TUG in <= 30 seconds with SC  assistive device, good eccentric control and safely with no LOB.  -     STG 6 Rhomberg EO >= 10 seconds.  -     STG 7 Stanbdard stance EO no UE support >= 30 seconds.  -            Long Term Goals    LTG 1 B LE 5/5.  -     LTG 2 Patient able to ascend/descend 3 steps reciprocally with 1 hand rail assist and SC assist x5 reps safely.  -     LTG 3 Patient able to ambulate with SC assistive device non-antalgic >= 150 feet, safely.  -     LTG 4 TUG in <= 20 seconds with SC assistive device, good eccentric control and safely with no LOB.  -     LTG 5 Patient able to perform UE limb reaching 12 inches from midline body with arm outstretched in multiple planes and across midline with no LOB.  -     LTG 6 Rhomberg EO >= 20 seconds.  -     LTG 7 Patient to be I with final HEP.  -            Time Calculation    PT Goal Re-Cert Due Date 12/13/21  -           User Key  (r) = Recorded By, (t) = Taken By, (c) = Cosigned By    Initials Name Provider Type     Lucila Araujo PTA Physical Therapy Assistant                Therapy Education  Given: Fall prevention and home safety, Mobility training, HEP  Program: Reinforced  How Provided: Verbal, Demonstration  Provided to: Patient  Level of Understanding: Teach back education performed, Verbalized              Time Calculation:   Start Time: 1054  Stop Time: 1133  Time Calculation (min): 39 min  Total Timed Code Minutes- PT: 39 minute(s)  Therapy Charges for Today     Code Description Service Date Service Provider Modifiers Qty    11334674325 HC PT THER PROC EA 15 MIN 12/13/2021 Lucila Araujo, DENISE GP 1    12644839595 HC PT THER SUPP EA 15 MIN 12/13/2021 Lucila Araujo, PTA GP 1    05160029072 HC PT NEUROMUSC RE EDUCATION EA 15 MIN 12/13/2021 Lucila Araujo PTA GP 1    67857638424 HC GAIT TRAINING EA 15 MIN 12/13/2021 Lucila Araujo, PTA GP 1                    Lucila Araujo PTA  12/13/2021       This document has been electronically signed by Lucila MARCIAL  Van, PTA on December 13, 2021 13:42 CST

## 2021-12-15 ENCOUNTER — HOSPITAL ENCOUNTER (OUTPATIENT)
Dept: PHYSICAL THERAPY | Facility: HOSPITAL | Age: 46
Setting detail: THERAPIES SERIES
Discharge: HOME | End: 2021-12-15

## 2021-12-15 DIAGNOSIS — Z91.81 AT RISK FOR FALLS: ICD-10-CM

## 2021-12-15 DIAGNOSIS — R26.89 BALANCE PROBLEM: ICD-10-CM

## 2021-12-15 DIAGNOSIS — G35 MS (MULTIPLE SCLEROSIS) (HCC): Primary | ICD-10-CM

## 2021-12-15 PROCEDURE — 97110 THERAPEUTIC EXERCISES: CPT

## 2021-12-15 PROCEDURE — 97112 NEUROMUSCULAR REEDUCATION: CPT

## 2021-12-15 NOTE — THERAPY TREATMENT NOTE
Outpatient Physical Therapy Ortho Treatment Note  [facility]     Patient Name: Jenn Good  : 1975  MRN: [id number]  Today's Date: 12/15/2021      Visit Date: 12/15/2021     Patient has attended 7 visits  35% Improvement  MD Visit: 05-  Recheck Date: 2021    Therapy Diagnosis: MS/Decreased Balance and Safety      Visit Dx:    ICD-10-CM ICD-9-CM   1. MS (multiple sclerosis) ([facility])  G35 340   2. Balance problem  R26.89 781.99   3. At risk for falls  Z91.81 V15.88       Patient Active Problem List   Diagnosis   • Rash   • Gastroesophageal reflux disease   • Primary insomnia   • Hormone replacement therapy   • Hypertension   • Low back pain with right-sided sciatica   • Candida, oral   • Candida vaginitis   • Nausea and vomiting   • Acute URI   • Multiple sclerosis ([facility])   • Impacted cerumen of right ear   • Chronic low back pain   • Acute sinusitis   • Sinus headache   • Vitamin D deficiency   • Neuropathic pain   • Depression   • Hypersomnia, suspected KACIE   • Fatigue        Past Medical History:   Diagnosis Date   • Abdominal pain, right upper quadrant    • Acid reflux    • Acute maxillary sinusitis    • Acute otitis media, left    • Acute pharyngitis    • Acute sinusitis    • Acute upper respiratory infection    • Allergic rhinitis    • Anxiety    • Anxiety state    • Arthritis    • Backache    • Breast tenderness    • Bronchitis    • Callus    • Candidiasis of mouth    • Constipation    • Depression    • Dysuria    • Elevated cholesterol    • Essential hypertension    • GERD (gastroesophageal reflux disease)    • High blood pressure    • Hypertension    • Impacted cerumen    • Ingrown toenail    • Insomnia    • Local infection of the skin and subcutaneous tissue, unspecified     R external ear      • Low back pain     with radiculopathy L buttock and posterior leg      • Malaise and fatigue    • Migraine with aura    • Migraines    • MS (multiple sclerosis) ([facility])    • Multiple  sclerosis (HCC)    • Palpitations    • Tinea corporis    • Vaginal discharge    • Vulvovaginitis     yeast infection        Past Surgical History:   Procedure Laterality Date   •  SECTION     • HYSTERECTOMY     • INJECTION OF MEDICATION  2016    Celestone (betamethasone) (1)      • INJECTION OF MEDICATION  05/10/2016    Rocephin (1)      • INJECTION OF MEDICATION  2015    Toradol (1)      • INJECTION OF MEDICATION  2015    Zofran (1)      • TUBAL ABDOMINAL LIGATION      Examination under anesthesia and laparoscopic tubal.   • UPPER GASTROINTESTINAL ENDOSCOPY  2015    Hiatal hernia. Gastritis. Unspecified gastric ulcer. Performed at [facility].        PT Ortho     Row Name 12/15/21 0900       Precautions and Contraindications    Precautions/Limitations fall precautions  -       Subjective Pain    Able to rate subjective pain? yes  -    Pre-Treatment Pain Level 6  -    Post-Treatment Pain Level 6  -          User Key  (r) = Recorded By, (t) = Taken By, (c) = Cosigned By    Initials Name Provider Type     Lucila Araujo, DENISE Physical Therapy Assistant                             PT Assessment/Plan     Row Name 12/15/21 0900          PT Assessment    Assessment Comments Discussed the need to speak with eye doctor and medical provider if she feels like she is having perceptual deficits especially if she is driving. patient is challenged with Susan Disc seated activity today. no loss of balance noted in standing or ambulating today  -            PT Plan    PT Frequency 2x/week  -     PT Plan Comments next visit recheck  -           User Key  (r) = Recorded By, (t) = Taken By, (c) = Cosigned By    Initials Name Provider Type     Lucila Araujo PTA Physical Therapy Assistant                   OP Exercises     Row Name 12/15/21 0900             Subjective Comments    Subjective Comments states that she doesn't feel like she needs the cane as much today. states that  she is driving but unsure if she should be. feels like she is all over but she really isn't.  -              Subjective Pain    Able to rate subjective pain? yes  -      Pre-Treatment Pain Level 6  -      Post-Treatment Pain Level 6  -              Exercise 1    Exercise Name 1 Pro II UE/LE bike for mm enudurance, strength and posture  -      Time 1 10 minutes  -      Additional Comments L 8.0  -              Exercise 2    Exercise Name 2 B Step Up Fwd  -      Reps 2 20 each  -      Additional Comments 6 inch step  -              Exercise 3    Exercise Name 3 Sit to/from Stand  -      Sets 3 20  -      Additional Comments 2 UE  -              Exercise 4    Exercise Name 4 B Lat Step Ups  -      Reps 4 20  -      Additional Comments 6 inch step  -              Exercise 5    Exercise Name 5 Standing HR/TR  -              Exercise 6    Exercise Name 6 Susan Disc Seated Chest Press out and Up alternating  -      Time 6 3 minutes  -      Additional Comments 2# bar  -              Exercise 7    Exercise Name 7 Susan Disc Seated Marching  -      Time 7 3 minutes  -              Exercise 8    Exercise Name 8 Susan Disc Seated Alt LAQ  -      Time 8 3 minutes  -            User Key  (r) = Recorded By, (t) = Taken By, (c) = Cosigned By    Initials Name Provider Type    Lucila Keita, PTA Physical Therapy Assistant                              PT OP Goals     Row Name 12/15/21 0900          PT Short Term Goals    STG 1 Patient I with HEP and have additions/changes by next recertification.  -     STG 1 Progress Met  -     STG 2 Patient able to ambulate 75 feet with SC and no knee giving out and no LOB.  -     STG 2 Progress Met  [facility]     STG 3 Patient to obtain SC and utilize outside of home for improved safety.  -     STG 3 Progress Partially Met  [facility]     STG 4 Patient able to perform sit to/from stand with 1 UE A = WB B LE x5, with good eccentric control in <= 15  seconds.  -     STG 5 TUG in <= 30 seconds with SC assistive device, good eccentric control and safely with no LOB.  -     STG 6 Rhomberg EO >= 10 seconds.  -     STG 7 Stanbdard stance EO no UE support >= 30 seconds.  -            Long Term Goals    LTG 1 B LE 5/5.  -     LTG 2 Patient able to ascend/descend 3 steps reciprocally with 1 hand rail assist and SC assist x5 reps safely.  -     LTG 3 Patient able to ambulate with SC assistive device non-antalgic >= 150 feet, safely.  -     LTG 4 TUG in <= 20 seconds with SC assistive device, good eccentric control and safely with no LOB.  -     LTG 5 Patient able to perform UE limb reaching 12 inches from midline body with arm outstretched in multiple planes and across midline with no LOB.  -     LTG 6 Rhomberg EO >= 20 seconds.  -     LTG 7 Patient to be I with final HEP.  -            Time Calculation    PT Goal Re-Cert Due Date 12/13/21  -           User Key  (r) = Recorded By, (t) = Taken By, (c) = Cosigned By    Initials Name Provider Type     Lucila Araujo PTA Physical Therapy Assistant                               Time Calculation:   Start Time: 0923  Stop Time: 1006  Time Calculation (min): 43 min  Total Timed Code Minutes- PT: 43 minute(s)  Therapy Charges for Today     Code Description Service Date Service Provider Modifiers Qty    01063221195 HC PT THER PROC EA 15 MIN 12/15/2021 Lucila Araujo PTA GP 2    12718092266 HC PT THER SUPP EA 15 MIN 12/15/2021 Lucila Araujo PTA GP 1    [id number] HC PT NEUROMUSC RE EDUCATION EA 15 MIN 12/15/2021 Lucila Araujo PTA GP 1                    Lucila Araujo PTA  12/15/2021       This document has been electronically signed by Lucila Araujo PTA on December 15, 2021 10:11 CST

## 2021-12-16 ENCOUNTER — INFUSION (OUTPATIENT)
Dept: ONCOLOGY | Facility: HOSPITAL | Age: 46
End: 2021-12-16

## 2021-12-16 VITALS
SYSTOLIC BLOOD PRESSURE: 125 MMHG | TEMPERATURE: 97.2 F | HEART RATE: 67 BPM | RESPIRATION RATE: 18 BRPM | DIASTOLIC BLOOD PRESSURE: 5 MMHG

## 2021-12-16 DIAGNOSIS — G35 MULTIPLE SCLEROSIS (HCC): Primary | ICD-10-CM

## 2021-12-16 PROCEDURE — 96365 THER/PROPH/DIAG IV INF INIT: CPT | Performed by: NURSE PRACTITIONER

## 2021-12-16 PROCEDURE — 25010000002 NATALIZUMAB PER 1 MG: Performed by: NURSE PRACTITIONER

## 2021-12-16 RX ORDER — ONDANSETRON 4 MG/1
4 TABLET, FILM COATED ORAL ONCE
Status: DISCONTINUED | OUTPATIENT
Start: 2021-12-16 | End: 2021-12-16 | Stop reason: HOSPADM

## 2021-12-16 RX ORDER — LORATADINE 10 MG/1
10 TABLET ORAL DAILY
Status: DISCONTINUED | OUTPATIENT
Start: 2021-12-16 | End: 2021-12-16 | Stop reason: HOSPADM

## 2021-12-16 RX ORDER — DIPHENHYDRAMINE HYDROCHLORIDE 50 MG/ML
25 INJECTION INTRAMUSCULAR; INTRAVENOUS AS NEEDED
Status: CANCELLED
Start: 2022-01-13

## 2021-12-16 RX ORDER — SODIUM CHLORIDE 9 MG/ML
250 INJECTION, SOLUTION INTRAVENOUS ONCE
Status: COMPLETED | OUTPATIENT
Start: 2021-12-16 | End: 2021-12-16

## 2021-12-16 RX ORDER — ACETAMINOPHEN 325 MG/1
650 TABLET ORAL ONCE
Status: CANCELLED | OUTPATIENT
Start: 2022-01-13

## 2021-12-16 RX ORDER — SODIUM CHLORIDE 9 MG/ML
250 INJECTION, SOLUTION INTRAVENOUS ONCE
Status: CANCELLED | OUTPATIENT
Start: 2022-01-13

## 2021-12-16 RX ORDER — LORATADINE 10 MG/1
10 TABLET ORAL DAILY
Status: CANCELLED
Start: 2022-01-13

## 2021-12-16 RX ORDER — ONDANSETRON 2 MG/ML
4 INJECTION INTRAMUSCULAR; INTRAVENOUS AS NEEDED
Status: CANCELLED
Start: 2022-01-13

## 2021-12-16 RX ORDER — ONDANSETRON HYDROCHLORIDE 4 MG/5ML
4 SOLUTION ORAL ONCE
Status: CANCELLED
Start: 2022-01-13 | End: 2022-01-13

## 2021-12-16 RX ORDER — ACETAMINOPHEN 325 MG/1
650 TABLET ORAL AS NEEDED
Status: CANCELLED
Start: 2022-01-13

## 2021-12-16 RX ORDER — IBUPROFEN 400 MG/1
400 TABLET ORAL AS NEEDED
Status: CANCELLED
Start: 2022-01-13

## 2021-12-16 RX ORDER — ACETAMINOPHEN 325 MG/1
650 TABLET ORAL ONCE
Status: DISCONTINUED | OUTPATIENT
Start: 2021-12-16 | End: 2021-12-16 | Stop reason: HOSPADM

## 2021-12-16 RX ADMIN — SODIUM CHLORIDE 250 ML: 9 INJECTION, SOLUTION INTRAVENOUS at 12:15

## 2021-12-16 RX ADMIN — NATALIZUMAB 300 MG: 300 INJECTION INTRAVENOUS at 13:05

## 2021-12-20 ENCOUNTER — HOSPITAL ENCOUNTER (OUTPATIENT)
Dept: PHYSICAL THERAPY | Facility: HOSPITAL | Age: 46
Setting detail: THERAPIES SERIES
Discharge: HOME | End: 2021-12-20

## 2021-12-20 DIAGNOSIS — G35 MS (MULTIPLE SCLEROSIS) (HCC): Primary | ICD-10-CM

## 2021-12-20 DIAGNOSIS — R26.89 BALANCE PROBLEM: ICD-10-CM

## 2021-12-20 DIAGNOSIS — Z91.81 AT RISK FOR FALLS: ICD-10-CM

## 2021-12-20 PROCEDURE — 97110 THERAPEUTIC EXERCISES: CPT | Performed by: PHYSICAL THERAPIST

## 2021-12-20 PROCEDURE — 97530 THERAPEUTIC ACTIVITIES: CPT | Performed by: PHYSICAL THERAPIST

## 2021-12-20 PROCEDURE — 97112 NEUROMUSCULAR REEDUCATION: CPT | Performed by: PHYSICAL THERAPIST

## 2021-12-20 NOTE — THERAPY PROGRESS REPORT/RE-CERT
.Outpatient Physical Therapy Neuro Progress Note  [facility]     Patient Name: Jenn Good  : 1975  MRN: [id number]  Today's Date: 2021      Visit Date: 2021     Patient seen for 8 PT sessions.  Patient reports 60% of improvement.  Next MD appt: 05/10/2022.  Recertification: 2022.    Therapy Diagnosis: MD/Nicolas balance and Safety        Patient Active Problem List   Diagnosis   • Rash   • Gastroesophageal reflux disease   • Primary insomnia   • Hormone replacement therapy   • Hypertension   • Low back pain with right-sided sciatica   • Candida, oral   • Candida vaginitis   • Nausea and vomiting   • Acute URI   • Multiple sclerosis (HCC)   • Impacted cerumen of right ear   • Chronic low back pain   • Acute sinusitis   • Sinus headache   • Vitamin D deficiency   • Neuropathic pain   • Depression   • Hypersomnia, suspected KACIE   • Fatigue        Past Medical History:   Diagnosis Date   • Abdominal pain, right upper quadrant    • Acid reflux    • Acute maxillary sinusitis    • Acute otitis media, left    • Acute pharyngitis    • Acute sinusitis    • Acute upper respiratory infection    • Allergic rhinitis    • Anxiety    • Anxiety state    • Arthritis    • Backache    • Breast tenderness    • Bronchitis    • Callus    • Candidiasis of mouth    • Constipation    • Depression    • Dysuria    • Elevated cholesterol    • Essential hypertension    • GERD (gastroesophageal reflux disease)    • High blood pressure    • Hypertension    • Impacted cerumen    • Ingrown toenail    • Insomnia    • Local infection of the skin and subcutaneous tissue, unspecified     R external ear      • Low back pain     with radiculopathy L buttock and posterior leg      • Malaise and fatigue    • Migraine with aura    • Migraines    • MS (multiple sclerosis) (HCC)    • Multiple sclerosis (HCC)    • Palpitations    • Tinea corporis    • Vaginal discharge    • Vulvovaginitis     yeast infection        Past  Surgical History:   Procedure Laterality Date   •  SECTION     • HYSTERECTOMY     • INJECTION OF MEDICATION  2016    Celestone (betamethasone) (1)      • INJECTION OF MEDICATION  05/10/2016    Rocephin (1)      • INJECTION OF MEDICATION  2015    Toradol (1)      • INJECTION OF MEDICATION  2015    Zofran (1)      • TUBAL ABDOMINAL LIGATION      Examination under anesthesia and laparoscopic tubal.   • UPPER GASTROINTESTINAL ENDOSCOPY  2015    Hiatal hernia. Gastritis. Unspecified gastric ulcer. Performed at [facility].         Visit Dx:     ICD-10-CM ICD-9-CM   1. MS (multiple sclerosis) ([facility])  G35 340   2. Balance problem  R26.89 781.99   3. At risk for falls  Z91.81 V15.88              PT Ortho     Row Name 21 1000       Subjective Comments    Subjective Comments Patient reports she is doing okay. She reports that she isn't having any apain. She reports her duglas helps but she still feels like she is all over the place. Patient reports no falls since starting PT. She reprots she always uses her SC when she goes out, but not always at home.  -AJ       Precautions and Contraindications    Precautions/Limitations fall precautions  -AJ       Subjective Pain    Pre-Treatment Pain Level 0  -AJ       MMT (Manual Muscle Testing)    General MMT Comments B UE 5/5. B LE 5/5.  -AJ          User Key  (r) = Recorded By, (t) = Taken By, (c) = Cosigned By    Initials Name Provider Type    AJ Dayanna Naqvi, PT DPT Physical Therapist               PT Neuro     Row Name 21 1000             Vision-Basic Assessment    Current Vision Wears glasses all the time  -AJ      Patient Visual Report Balance difficulty; Blurring of vision when changing focal distance  -AJ              Cognition    Overall Cognitive Status WFL  -AJ      Arousal/Alertness Appropriate responses to stimuli  -AJ      Memory Appears intact  -      Orientation Level Oriented X4  -AJ      Safety Judgment  Decreased awareness of need for assistance  -AJ      Deficits Decreased awareness of deficits  -AJ              Sensation    Sensation WNL? WFL  -AJ      Light Touch No apparent deficits  -AJ              Posture/Observations    Alignment Options Forward head; Thoracic kyphosis; Lumbar lordosis; Foot pronation; Pes Planus  -AJ      Forward Head Mild; Increased  -AJ      Thoracic Kyphosis Mild; Increased  -AJ      Lumbar lordosis Normal  -AJ      Foot pronation Bilateral:; Mild; Moderate; Increased  -AJ      Pes Planus Bilateral:; Mild; Moderate; Increased  -AJ      Posture/Observations Comments Ambulates in with SC, half carrying it at times. Gait is still ataxic.  -AJ              Coordination    Rapid Alternating Bilteral:; Impaired  -AJ      Finger to Nose Eyes Open Bilteral:; Intact  -AJ      Finger to Nose Eyes Closed Bilteral:; Impaired  -AJ      Crossing Midline Bilteral:; Intact  -AJ      Bilateral Integration Bilteral:; Impaired  -AJ              Gross Motor Coordination Training    Coordination ataxia  -AJ              General ROM    GENERAL ROM COMMENTS AROM for B UE/LE/Trunk all WNL  -AJ              Tone    UE Tone bilateral:; WNL for age  -AJ      LE Tone bilateral:; WNL for age  -AJ      Trunk Tone WNL for age  -AJ              Transfers    Bed-Chair [place] (Transfers) independent  -AJ      Chair-Bed [place] (Transfers) independent  -AJ      Sit-Stand [place] (Transfers) independent  -AJ      Stand-Sit [place] (Transfers) independent  -AJ      Transfer, Safety Issues balance decreased during turns; sequencing ability decreased; knees buckling; impulsivity  -AJ      Comment (Transfers) Improved eccentric control. Difficulty with 1 UE sit to/from stand.  -AJ              Gait/Stairs (Locomotion)    [place] Level (Gait) modified independence  -AJ      Assistive Device (Gait) cane, straight  -AJ      Pattern (Gait) 3-point; step-through  -AJ      Bilateral Gait Deviations knee  buckling, bilateral  -AJ      Negotiation (Stairs) --  Not tested  -AJ      Comment (Gait/Stairs) At times requires cues to not let SC get too far behind her.  -AJ            User Key  (r) = Recorded By, (t) = Taken By, (c) = Cosigned By    Initials Name Provider Type    Dayanna Blackwood, PT DPT Physical Therapist                        Therapy Education  Given: HEP, Fall prevention and home safety (POC)  Program: Reinforced  How Provided: Verbal, Demonstration  Provided to: Patient  Level of Understanding: Verbalized, Demonstrated     PT OP Goals     Row Name 12/20/21 1000          PT Short Term Goals    STG 1 Patient I with HEP and have additions/changes by next recertification.  -AJ     STG 1 Progress Met  -AJ     STG 2 Patient able to ambulate 75 feet with SC and no knee giving out and no LOB.  -AJ     STG 2 Progress Met  -AJ     STG 3 Patient to obtain SC and utilize outside of home for improved safety.  -AJ     STG 3 Progress Partially Met  -AJ     STG 4 Patient able to perform sit to/from stand with 1 UE A = WB B LE x5, with good eccentric control in <= 15 seconds.  -AJ     STG 4 Progress Ongoing  -AJ     STG 4 Progress Comments 34 seconds, 1 UE A, 2 missed attempts  -AJ     STG 5 TUG in <= 30 seconds with SC assistive device, good eccentric control and safely with no LOB.  -AJ     STG 5 Progress Partially Met; Ongoing  -AJ     STG 6 Rhomberg EO >= 10 seconds.  -AJ     STG 6 Progress Met  -AJ     STG 7 [place]ard stance EO no UE support >= 30 seconds.  -AJ     STG 7 Progress Met  -AJ            Long Term Goals    LTG 1 B LE 5/5.  -AJ     LTG 1 Progress Met  -AJ     LTG 2 Patient able to ascend/descend 3 steps reciprocally with 1 hand rail assist and SC assist x5 reps safely.  -AJ     LTG 3 Patient able to ambulate with SC assistive device non-antalgic >= 150 feet, safely.  -AJ     LTG 3 Progress Partially Met; Ongoing; Progressing  -AJ     LTG 4 TUG in <= 20 seconds with SC assistive device, good  eccentric control and safely with no LOB.  -     LTG 5 Patient able to perform UE limb reaching 12 inches from midline body with arm outstretched in multiple planes and across midline with no LOB.  -     LTG 5 Progress Ongoing; Progressing  -     LTG 6 Rhomberg EO >= 20 seconds.  -     LTG 6 Progress Met  -     LTG 7 Patient to be I with final HEP.  -     LTG 7 Progress Ongoing  -            Time Calculation    PT Goal Re-Cert Due Date 01/11/22  -           User Key  (r) = Recorded By, (t) = Taken By, (c) = Cosigned By    Initials Name Provider Type    Dayanna Blackwood, PT DPT Physical Therapist                Barriers to Rehab: Include significant or possible arthritic/degenerative changes that have occurred within the joint/spine, neurologic degenerative condition, The chronicity of this issue, The patient's obesity.     Safety Issues: Fall risk     PT Assessment/Plan     Row Name 12/20/21 1000          PT Assessment    Functional Limitations Impaired gait; Impaired locomotion; Limitation in home management; Limitations in community activities; Performance in leisure activities; Performance in self-care ADL  -     Impairments Balance; Coordination; Endurance; Gait; Impaired flexibility; Impaired muscle endurance; Pain; Muscle strength; Locomotion  -     Assessment Comments Patient has improvements with gait, endurane, strength, and safety. Still requires some safety cues at times. Patient struggles with times of freezing in feet hen trying t oliftfoot. Discussed with patient about pressing foot down before trying ot lift when that occurs.  -     Rehab Potential Fair  -     Patient/caregiver participated in establishment of treatment plan and goals Yes  -     Patient would benefit from skilled therapy intervention Yes  -            PT Plan    PT Frequency 2x/week  -     Predicted Duration of Therapy Intervention (PT) 6-8 more visits  -     PT Plan Comments Continue to  "progress strength/endurane, balance, gait, and safety.  -AJ           User Key  (r) = Recorded By, (t) = Taken By, (c) = Cosigned By    Initials Name Provider Type    Dayanna Blackwood, PT DPT Physical Therapist             Other therapeutic activities and/or exercises will be prescribed depending on the patient's progress or lack thereof.         OP Exercises     Row Name 12/20/21 1000             Subjective Comments    Subjective Comments Patient reports she is doing okay. She reports that she isn't having any apain. She reports her duglas helps but she still feels like she is all over the place. Patient reports no falls since starting PT. She reprots she always uses her SC when she goes out, but not always at home.  -AJ              Subjective Pain    Able to rate subjective pain? yes  -AJ      Pre-Treatment Pain Level 0  -AJ      Post-Treatment Pain Level 0  -AJ              Exercise 1    Exercise Name 1 Pro II UE/LE bike for mm enudurance, strength and posture  -AJ      Time 1 12 minutes  -AJ      Additional Comments L 8.0  -AJ              Exercise 2    Exercise Name 2 TUG  -AJ      Reps 2 2  -AJ              Exercise 3    Exercise Name 3 Sit to/from Stand  -AJ      Reps 3 5  -AJ      Additional Comments 34 seconds, 1 UE A, 2 missed attempts  -AJ              Exercise 4    Exercise Name 4 Standard stance EO/EC  -AJ              Exercise 5    Exercise Name 5 Rhomberg EO  -AJ              Exercise 6    Exercise Name 6 Airex Beam Fwd with MS  -AJ      Reps 6 5 laps  -AJ              Exercise 7    Exercise Name 7 Airex alt marching  -AJ      Time 7 3 minutes  -AJ              Exercise 8    Exercise Name 8 Airex HR/TR  -AJ      Reps 8 20  -AJ              Exercise 9    Exercise Name 9 St. alt toe taps on 4\" step  -AJ      Sets 9 2  -AJ      Reps 9 10 each LE  -AJ              Exercise 10    Exercise Name 10 Bridges with UE \"X\"  -AJ      Sets 10 2  -AJ      Reps 10 10  -AJ      Time 10 5\" hold  -AJ           " "   Exercise 11    Exercise Name 11 DKTC with Pball  -AJ      Reps 11 20  -AJ      Time 11 5\" hold  -AJ              Exercise 12    Exercise Name 12 DDKTC with Pball  -AJ      Reps 12 20  -AJ      Time 12 5\" hold  -AJ            User Key  (r) = Recorded By, (t) = Taken By, (c) = Cosigned By    Initials Name Provider Type    Dayanna Blackwood, PT DPT Physical Therapist                            Outcome Measure Options: Lower Extremity Functional Scale (LEFS), Timed Up and Go (TUG), 25 Foot Walk, 5x Sit to Stand  5 Times Sit to Stand  5 Times Sit to Stand (seconds): 34 seconds  5 Times Sit to Stand Comments: 1 UE A, improved eccentric control, but did have 2 missed attempts  Lower Extremity Functional Index  Any of your usual work, housework or school activities: Moderate difficulty  Your usual hobbies, recreational or sporting activities: Extreme difficulty or unable to perform activity  Getting into or out of the bath: Quite a bit of difficulty  Walking between rooms: A little bit of difficulty  Putting on your shoes or socks: A little bit of difficulty  Squatting: A little bit of difficulty  Lifting an object, like a bag of groceries from the floor: A little bit of difficulty  Performing light activities around your home: A little bit of difficulty  Performing heavy activities around your home: Moderate difficulty  Getting into or out of a car: Moderate difficulty  Walking 2 blocks: Moderate difficulty  Walking a mile: Moderate difficulty  Going up or down 10 stairs (about 1 flight of stairs): Moderate difficulty  Standing for 1 hour: Moderate difficulty  Sitting for 1 hour: Moderate difficulty  Running on even ground: Quite a bit of difficulty  Running on uneven ground: Extreme difficulty or unable to perform activity  Making sharp turns while running fast: Extreme difficulty or unable to perform activity  Hopping: Extreme difficulty or unable to perform activity  Rolling over in bed: Moderate " difficulty  Total: 35  Timed Up and Go (TUG)  TUG Test 1: 27 seconds  TUG Test 2: 29 seconds  Timed Up and Go Comments: Utilizing SC, B UE A sit sto/from stand with improved eccentric control. Still some ataxia and cues to not let SC get behind her with gait.    Time Calculation:   Start Time: 1049  Stop Time: 1152  Time Calculation (min): 63 min  Total Timed Code Minutes- PT: 63 minute(s)   Therapy Charges for Today     Code Description Service Date Service Provider Modifiers Qty    41341485708  PT THERAPEUTIC ACT EA 15 MIN 12/20/2021 Dayanna Naqvi, PT DPT GP 1    80774799463  PT THER SUPP EA 15 MIN 12/20/2021 Dayanna Naqvi, PT DPT GP 2    [id number]  PT THER PROC EA 15 MIN 12/20/2021 Dayanna Naqvi, PT DPT GP 1    [id number]  PT NEUROMUSC RE EDUCATION EA 15 MIN 12/20/2021 Dayanna Naqvi, PT DPT GP 2          PT G-Codes  Outcome Measure Options: Lower Extremity Functional Scale (LEFS), Timed Up and Go (TUG), 25 Foot Walk, 5x Sit to Stand  Total: 35  TUG Test 1: 27 seconds  TUG Test 2: 29 seconds         This document has been electronically signed by Dayanna Naqvi PT DPT, [facility] on December 20, 2021 12:08 CST

## 2021-12-22 ENCOUNTER — HOSPITAL ENCOUNTER (OUTPATIENT)
Dept: PHYSICAL THERAPY | Facility: HOSPITAL | Age: 46
Setting detail: THERAPIES SERIES
Discharge: HOME | End: 2021-12-22

## 2021-12-22 DIAGNOSIS — M72.2 PLANTAR FASCIITIS: ICD-10-CM

## 2021-12-22 DIAGNOSIS — G35 MS (MULTIPLE SCLEROSIS) (HCC): Primary | ICD-10-CM

## 2021-12-22 DIAGNOSIS — Z91.81 AT RISK FOR FALLS: ICD-10-CM

## 2021-12-22 DIAGNOSIS — R26.89 BALANCE PROBLEM: ICD-10-CM

## 2021-12-22 PROCEDURE — 97116 GAIT TRAINING THERAPY: CPT

## 2021-12-22 PROCEDURE — 97110 THERAPEUTIC EXERCISES: CPT

## 2021-12-22 NOTE — THERAPY TREATMENT NOTE
Outpatient Physical Therapy Ortho Treatment Note  [facility]     Patient Name: Jenn Good  : 1975  MRN: [id number]  Today's Date: 2021      Visit Date: 2021     Patient has attended 9 visits  60% Improvement  MD Visit: 05-  Recheck Date: 2022    Therapy Diagnosis: MS/Decreased Balance and Safety      Visit Dx:    ICD-10-CM ICD-9-CM   1. MS (multiple sclerosis) (HCC)  G35 340   2. Balance problem  R26.89 781.99   3. At risk for falls  Z91.81 V15.88   4. Plantar fasciitis  M72.2 728.71       Patient Active Problem List   Diagnosis   • Rash   • Gastroesophageal reflux disease   • Primary insomnia   • Hormone replacement therapy   • Hypertension   • Low back pain with right-sided sciatica   • Candida, oral   • Candida vaginitis   • Nausea and vomiting   • Acute URI   • Multiple sclerosis (HCC)   • Impacted cerumen of right ear   • Chronic low back pain   • Acute sinusitis   • Sinus headache   • Vitamin D deficiency   • Neuropathic pain   • Depression   • Hypersomnia, suspected KACIE   • Fatigue        Past Medical History:   Diagnosis Date   • Abdominal pain, right upper quadrant    • Acid reflux    • Acute maxillary sinusitis    • Acute otitis media, left    • Acute pharyngitis    • Acute sinusitis    • Acute upper respiratory infection    • Allergic rhinitis    • Anxiety    • Anxiety state    • Arthritis    • Backache    • Breast tenderness    • Bronchitis    • Callus    • Candidiasis of mouth    • Constipation    • Depression    • Dysuria    • Elevated cholesterol    • Essential hypertension    • GERD (gastroesophageal reflux disease)    • High blood pressure    • Hypertension    • Impacted cerumen    • Ingrown toenail    • Insomnia    • Local infection of the skin and subcutaneous tissue, unspecified     R external ear      • Low back pain     with radiculopathy L buttock and posterior leg      • Malaise and fatigue    • Migraine with aura    • Migraines    • MS (multiple  sclerosis) (HCC)    • Multiple sclerosis (HCC)    • Palpitations    • Tinea corporis    • Vaginal discharge    • Vulvovaginitis     yeast infection        Past Surgical History:   Procedure Laterality Date   •  SECTION     • HYSTERECTOMY     • INJECTION OF MEDICATION  2016    Celestone (betamethasone) (1)      • INJECTION OF MEDICATION  05/10/2016    Rocephin (1)      • INJECTION OF MEDICATION  2015    Toradol (1)      • INJECTION OF MEDICATION  2015    Zofran (1)      • TUBAL ABDOMINAL LIGATION      Examination under anesthesia and laparoscopic tubal.   • UPPER GASTROINTESTINAL ENDOSCOPY  2015    Hiatal hernia. Gastritis. Unspecified gastric ulcer. Performed at [facility].                        PT Assessment/Plan     Row Name 21 1000          PT Assessment    Assessment Comments patient is educated on the importance of her core stability for maintaining balance when she is completing dynamic activities. Exercises are issued focused on core stability to improve balance. core stabilization activities are initiated wich include UE/LE movements reciprocating opp UE with opp LE to facilitate more functional movements when walking. patient is educated on the importance of slowing down with her exericses and perfomring movements slow and controlled focusing on core engagement. time is spent with gait training attempting to get appropriate sequence with cane. when making turns or curving around something, patient has loss of balance every single time. patient states that she is dizzy when this happens.  -            PT Plan    PT Frequency 2x/week  -     PT Plan Comments continue to progress core stability to facilitate improved balance in standing  -           User Key  (r) = Recorded By, (t) = Taken By, (c) = Cosigned By    Initials Name Provider Type    Lucila Keita PTA Physical Therapy Assistant                   OP Exercises     Row Name 21 1000              Exercise 1    Exercise Name 1 Pro II UE/LE bike for mm enudurance, strength and posture  -      Time 1 10 minutes  -      Additional Comments L 8.0  -              Exercise 2    Exercise Name 2 Gait training with appropriate arm swing  -              Exercise 3    Exercise Name 3 EFX for gait training  -              Exercise 4    Exercise Name 4 Bridges with arms across chest  -      Reps 4 20  -      Time 4 5 sec hold  -              Exercise 5    Exercise Name 5 DKTC Physioball Rolls  -      Time 5 repeating every 5 seconds for 3 minutes  -              Exercise 6    Exercise Name 6 Dead Bug  -      Time 6 alternating every 5 seconds for 3 minutes  -            User Key  (r) = Recorded By, (t) = Taken By, (c) = Cosigned By    Initials Name Provider Type    Lucila Keita PTA Physical Therapy Assistant                              PT OP Goals     Row Name 12/22/21 1000          Time Calculation    PT Goal Re-Cert Due Date 01/11/22  -           User Key  (r) = Recorded By, (t) = Taken By, (c) = Cosigned By    Initials Name Provider Type     Luicla Araujo PTA Physical Therapy Assistant                Therapy Education  Given: HEP, Symptoms/condition management, Pain management, Fall prevention and home safety  Program: New, Reinforced  How Provided: Verbal, Demonstration, Written  Provided to: Patient  Level of Understanding: Teach back education performed, Verbalized, Demonstrated              Time Calculation:   Start Time: 1045  Stop Time: 1130  Time Calculation (min): 45 min  Total Timed Code Minutes- PT: 45 minute(s)  Therapy Charges for Today     Code Description Service Date Service Provider Modifiers Qty    69791737035 HC PT THER PROC EA 15 MIN 12/22/2021 Lucila Araujo PTA GP 2    53981617161 HC PT THER SUPP EA 15 MIN 12/22/2021 Lucila Araujo PTA GP 1    [id number] HC GAIT TRAINING EA 15 MIN 12/22/2021 Lucila Araujo PTA GP 1                    Lucila Araujo  PTA  12/22/2021       This document has been electronically signed by Lucila Araujo, DENISE on December 22, 2021 11:32 CST

## 2021-12-23 ENCOUNTER — TELEPHONE (OUTPATIENT)
Dept: FAMILY MEDICINE CLINIC | Facility: CLINIC | Age: 46
End: 2021-12-23

## 2021-12-27 ENCOUNTER — OFFICE VISIT (OUTPATIENT)
Dept: FAMILY MEDICINE CLINIC | Facility: CLINIC | Age: 46
End: 2021-12-27

## 2021-12-27 VITALS
WEIGHT: 251 LBS | HEART RATE: 82 BPM | HEIGHT: 66 IN | BODY MASS INDEX: 40.34 KG/M2 | TEMPERATURE: 98 F | OXYGEN SATURATION: 96 % | DIASTOLIC BLOOD PRESSURE: 80 MMHG | SYSTOLIC BLOOD PRESSURE: 118 MMHG

## 2021-12-27 DIAGNOSIS — G89.29 OTHER CHRONIC PAIN: ICD-10-CM

## 2021-12-27 DIAGNOSIS — G89.29 CHRONIC PAIN OF RIGHT KNEE: Primary | ICD-10-CM

## 2021-12-27 DIAGNOSIS — J01.90 ACUTE SINUSITIS, RECURRENCE NOT SPECIFIED, UNSPECIFIED LOCATION: ICD-10-CM

## 2021-12-27 DIAGNOSIS — T78.40XD ALLERGY, SUBSEQUENT ENCOUNTER: ICD-10-CM

## 2021-12-27 DIAGNOSIS — M25.561 CHRONIC PAIN OF RIGHT KNEE: Primary | ICD-10-CM

## 2021-12-27 PROBLEM — I12.9 HYPERTENSIVE RENAL DISEASE: Status: ACTIVE | Noted: 2021-12-27

## 2021-12-27 PROCEDURE — 99214 OFFICE O/P EST MOD 30 MIN: CPT | Performed by: STUDENT IN AN ORGANIZED HEALTH CARE EDUCATION/TRAINING PROGRAM

## 2021-12-27 RX ORDER — LORATADINE 10 MG/1
10 TABLET ORAL DAILY
Qty: 90 TABLET | Refills: 1 | Status: SHIPPED | OUTPATIENT
Start: 2021-12-27

## 2021-12-27 RX ORDER — PREGABALIN 150 MG/1
1 CAPSULE ORAL 3 TIMES DAILY
COMMUNITY

## 2021-12-27 RX ORDER — FLUTICASONE PROPIONATE 50 MCG
2 SPRAY, SUSPENSION (ML) NASAL DAILY
Qty: 16 G | Refills: 2 | Status: SHIPPED | OUTPATIENT
Start: 2021-12-27

## 2021-12-27 NOTE — PROGRESS NOTES
"Subjective:  Jenn Good is a 46 y.o. female who presents for MS, chronic knee pain follow-up    MS; states that currently undergoing IV infusion for MS but needs to transition to a different treatment. Undergoing PT, states that balance issues have not been getting worse, slowly improving however has noted some dizziness, light headedness since august. States has had increased congestion in her nose for the past several days. Denied sore throat, cough, fever, chills.     Chronic knee pain; Currently on Norco 7.5 mg BID, no issues with medication, does not make her more dizzy or cause worsening balance.  States medication provides good relief, without medication would negatively affect her quality of life.  No issues with illicit drug use, increased tolerance.      Patient Active Problem List   Diagnosis   • Rash   • Gastroesophageal reflux disease   • Primary insomnia   • Hormone replacement therapy   • Hypertension   • Low back pain with right-sided sciatica   • Candida, oral   • Candida vaginitis   • Nausea and vomiting   • Acute URI   • Multiple sclerosis (HCC)   • Impacted cerumen of right ear   • Chronic low back pain   • Acute sinusitis   • Sinus headache   • Vitamin D deficiency   • Neuropathic pain   • Depression   • Hypersomnia, suspected KACIE   • Fatigue   • Hypertensive renal disease     Vitals:    Vitals:    12/27/21 0820   BP: 118/80   BP Location: Right arm   Patient Position: Sitting   Cuff Size: Large Adult   Pulse: 82   Temp: 98 °F (36.7 °C)   SpO2: 96%   Weight: 114 kg (251 lb)   Height: 167.6 cm (66\")     Body mass index is 40.51 kg/m².      Current Outpatient Medications:   •  baclofen (LIORESAL) 20 MG tablet, Take 1.5 tablets by mouth 4 (Four) Times a Day As Needed., Disp: , Rfl:   •  diclofenac (VOLTAREN) 75 MG EC tablet, Take 1 tablet by mouth 2 (Two) Times a Day., Disp: 60 tablet, Rfl: 5  •  dilTIAZem CD (Cartia XT) 240 MG 24 hr capsule, Take 1 capsule by mouth Daily., Disp: 90 capsule, " Rfl: 3  •  diphenhydrAMINE (BENADRYL) 25 mg capsule, Take 50 mg by mouth At Night As Needed for Sleep., Disp: , Rfl:   •  DULoxetine (CYMBALTA) 30 MG capsule, Take 30 mg by mouth 2 (Two) Times a Day., Disp: , Rfl:   •  HYDROcodone-acetaminophen (NORCO) 7.5-325 MG per tablet, Take 1 tablet by mouth 2 (Two) Times a Day As Needed for Moderate Pain ., Disp: 60 tablet, Rfl: 0  •  melatonin 5 MG tablet tablet, Take 5 mg by mouth., Disp: , Rfl:   •  natalizumab (TYSABRI) 300 MG/15ML injection, Infuse  into a venous catheter., Disp: , Rfl:   •  nystatin (MYCOSTATIN) 097765 UNIT/ML suspension, Swish and swallow 5 mL 4 (Four) Times a Day., Disp: 60 mL, Rfl: 5  •  omeprazole (priLOSEC) 40 MG capsule, Take 1 capsule by mouth Daily., Disp: 90 capsule, Rfl: 1  •  OXcarbazepine (TRILEPTAL) 150 MG tablet, Take 150 mg by mouth 2 (Two) Times a Day., Disp: , Rfl:   •  pregabalin (LYRICA) 150 MG capsule, Take 1 capsule by mouth 3 (Three) Times a Day., Disp: , Rfl:   •  telmisartan (MICARDIS) 40 MG tablet, Take 40 mg by mouth Daily., Disp: , Rfl: 3  •  tiZANidine (ZANAFLEX) 4 MG tablet, Take 4 mg by mouth At Night As Needed for muscle spasms., Disp: , Rfl:   •  traZODone (DESYREL) 100 MG tablet, Take 1 tablet by mouth Every Night., Disp: 90 tablet, Rfl: 3  •  triamcinolone (KENALOG) 0.1 % ointment, Apply 1 application topically to the appropriate area as directed 2 (Two) Times a Day As Needed., Disp: , Rfl:   •  fluticasone (Flonase) 50 MCG/ACT nasal spray, 2 sprays into the nostril(s) as directed by provider Daily., Disp: 16 g, Rfl: 2  •  loratadine (Claritin) 10 MG tablet, Take 1 tablet by mouth Daily., Disp: 90 tablet, Rfl: 1    Patient Active Problem List   Diagnosis   • Rash   • Gastroesophageal reflux disease   • Primary insomnia   • Hormone replacement therapy   • Hypertension   • Low back pain with right-sided sciatica   • Candida, oral   • Candida vaginitis   • Nausea and vomiting   • Acute URI   • Multiple sclerosis (HCC)   •  Impacted cerumen of right ear   • Chronic low back pain   • Acute sinusitis   • Sinus headache   • Vitamin D deficiency   • Neuropathic pain   • Depression   • Hypersomnia, suspected KACIE   • Fatigue   • Hypertensive renal disease     Past Surgical History:   Procedure Laterality Date   •  SECTION     • HYSTERECTOMY     • INJECTION OF MEDICATION  2016    Celestone (betamethasone) (1)      • INJECTION OF MEDICATION  05/10/2016    Rocephin (1)      • INJECTION OF MEDICATION  2015    Toradol (1)      • INJECTION OF MEDICATION  2015    Zofran (1)      • TUBAL ABDOMINAL LIGATION  2000    Examination under anesthesia and laparoscopic tubal.   • UPPER GASTROINTESTINAL ENDOSCOPY  2015    Hiatal hernia. Gastritis. Unspecified gastric ulcer. Performed at [facility].     Social History     Socioeconomic History   • Marital status: Single   Tobacco Use   • Smoking status: Never Smoker   • Smokeless tobacco: Never Used   Vaping Use   • Vaping Use: Never used   Substance and Sexual Activity   • Alcohol use: Yes     Alcohol/week: 2.0 standard drinks     Types: 2 Glasses of wine per week     Comment: pt states she drinks about 2 a month   • Drug use: No   • Sexual activity: Defer     Family History   Problem Relation Age of Onset   • Cancer Maternal Grandmother    • Cancer Maternal Grandfather    • Cancer Paternal Grandmother    • Cancer Paternal Grandfather    • Breast cancer Other      Results Encounter on 2021   Component Date Value Ref Range Status   • Total Protein 2021 7.0  6.0 - 8.5 g/dL Final   • Albumin 2021 4.00  3.50 - 5.20 g/dL Final   • ALT (SGPT) 2021 10  1 - 33 U/L Final   • AST (SGOT) 2021 15  1 - 32 U/L Final   • Alkaline Phosphatase 2021 108  39 - 117 U/L Final   • Total Bilirubin 2021 0.2  0.0 - 1.2 mg/dL Final   • Bilirubin, Direct 2021 <0.2  0.0 - 0.3 mg/dL Final   • Bilirubin, Indirect 2021    Final    Unable to  calculate      Polysomnography 4 or More Parameters  [facility]  [address] [place] [address]  Phone: [phone]  Fax: [phone]    Polysomnography Interpretation    Patient's Name: Jenn Good  YOB: 1975  Referring Physician:  Jair Gregg MD  Primary Care Physician: Nehemiah Chow MD  Date of Study: 06/13/2021  Ordering Provider: Dr. Jair Gregg  Interpreting Physician: Jair Gregg MD.  Date of Interpretation: 6/17/2021    Indications/Narrative:    Patient is a 46 y.o. female with history of daytime sleepiness and   hypersomnia with a BMI 38.4, neck circumference 14.5 inches, patient on   multiple medication including Adderall, Cymbalta, trazodone, melatonin,   Lyrica among others. This is a technical adequate study.  I have   personally reviewed the raw data and summarized as below. Patient slept   for a total of 381 minutes.    1. No REM sleep period, sleep latency was 26.8 minutes and the sleep   efficiency 88.6  2. Patient respiratory monitor showed No evidence CSA/KACIE, apnea hypopnea   index of 0.6 events per hour  3. The mean SaO2 during the sudy was 95% with a dameon of 92%.  4. The patient's EKG showed normal sinus rhythm with a mean heart rate of   66.5bpm.  5. Snoring was Audible during recording       Impression:   1. After review of respiratory data there is no evidence of a sleep   disordered breathing on this diagnostic polysomnogram, AHI was 0.6 events   per hour  2. Lack of REM sleep may be due to medications, REM suppressing   medications  3. Severity may have been underestimated due to the lack of REM sleep    Recommendation:  1. Consider other possible etiology for this patient symptoms of   hypersomnia such as medication induced, poor sleep hygiene, insufficient   sleep syndrome among others  2. Clinical correlation recommended    Jair Gregg MD, FACP, FCCP  Diplomate in Pulmonary & Sleep Medicine    This  document has been electronically signed by Jair Gregg MD on   June 17, 2021 22:42 CDT      EMR Dragon/Transcription disclaimer:   Some of this note may be an electronic transcription/translation of spoken   language to printed text. The electronic translation of spoken language   may permit erroneous, or at times, nonsensical words or phrases to be   inadvertently transcribed; Although I have reviewed the note for such   errors, some may still exist.      [unfilled]  Immunization History   Administered Date(s) Administered   • COVID-19 (MODERNA) 1st, 2nd, 3rd Dose Only 06/12/2021, 07/15/2021   • Flu Vaccine Intradermal Quad 18-64YR 12/14/2012   • Flu Vaccine Quad PF >36MO 12/01/2011, 11/04/2013   • Hep A / Hep B 01/14/2011   • Hepatitis B 07/12/2010, 08/31/2010   • Tdap 01/14/2011     The following portions of the patient's history were reviewed and updated as appropriate: allergies, current medications, past family history, past medical history, past social history, past surgical history and problem list.    PHQ-9 Total Score:           Physical Exam  Constitutional:       Appearance: Normal appearance.   HENT:      Head: Normocephalic and atraumatic.      Right Ear: External ear normal.      Left Ear: External ear normal.   Eyes:      General:         Right eye: No discharge.         Left eye: No discharge.      Conjunctiva/sclera: Conjunctivae normal.   Cardiovascular:      Rate and Rhythm: Normal rate and regular rhythm.      Pulses: Normal pulses.      Heart sounds: Normal heart sounds. No murmur heard.      Pulmonary:      Effort: Pulmonary effort is normal. No respiratory distress.      Breath sounds: Normal breath sounds.   Abdominal:      General: There is no distension.      Palpations: Abdomen is soft.      Tenderness: There is no abdominal tenderness.   Musculoskeletal:      Cervical back: Normal range of motion.      Right lower leg: Right lower leg edema:         Left lower leg: No edema.    Lymphadenopathy:      Cervical: No cervical adenopathy.   Neurological:      Mental Status: She is alert. Mental status is at baseline.   Psychiatric:         Mood and Affect: Mood normal.         Behavior: Behavior normal.       Assessment/Plan    Diagnosis Plan   1. Chronic pain of right knee  HYDROcodone-acetaminophen (NORCO) 7.5-325 MG per tablet   2. Allergy, subsequent encounter  loratadine (Claritin) 10 MG tablet   3. Acute sinusitis, recurrence not specified, unspecified location  fluticasone (Flonase) 50 MCG/ACT nasal spray   4. Other chronic pain  HYDROcodone-acetaminophen (NORCO) 7.5-325 MG per tablet      No orders of the defined types were placed in this encounter.    Chronic pain; currently on Norco 7.5 mg twice daily as needed, tolerating medication well, no adverse effects, awaiting pain management.  No signs of abuse, misuse, José Miguel reviewed and appropriate, will refill.  Follow-up in 3 months, sooner if needed.    Congestion; no red flags, treat symptomatically with Claritin, Flonase, strict ER/return precautions given, patient voiced understanding, agreeable to plan.          This document has been electronically signed by Nehemiah Chow MD on December 28, 2021 07:56 CST    EMR Dragon/Transciption Disclaimer: Some of this note may be an electronic transcription/translation of spoken language to printed text.  The electronic translation of spoken language may permit erroneous, or at times, nonsensical words or phrases to be inadvertently transcribed. Although I have reviewed the note for such errors, some may still exist.

## 2021-12-28 ENCOUNTER — DOCUMENTATION (OUTPATIENT)
Dept: ONCOLOGY | Facility: HOSPITAL | Age: 46
End: 2021-12-28

## 2021-12-28 ENCOUNTER — APPOINTMENT (OUTPATIENT)
Dept: PHYSICAL THERAPY | Facility: HOSPITAL | Age: 46
End: 2021-12-28

## 2021-12-28 LAB
CONV INDEX VALUE: 3.69
INTERPRETATION: ABNORMAL
INTERPRETATION: ABNORMAL
JCPYV AB SERPL QL IA: POSITIVE

## 2021-12-28 RX ORDER — HYDROCODONE BITARTRATE AND ACETAMINOPHEN 7.5; 325 MG/1; MG/1
1 TABLET ORAL 2 TIMES DAILY PRN
Qty: 60 TABLET | Refills: 0 | Status: SHIPPED | OUTPATIENT
Start: 2021-12-28 | End: 2022-01-21 | Stop reason: SDUPTHER

## 2021-12-30 ENCOUNTER — HOSPITAL ENCOUNTER (OUTPATIENT)
Dept: PHYSICAL THERAPY | Facility: HOSPITAL | Age: 46
Setting detail: THERAPIES SERIES
End: 2021-12-30

## 2022-01-03 ENCOUNTER — APPOINTMENT (OUTPATIENT)
Dept: PHYSICAL THERAPY | Facility: HOSPITAL | Age: 47
End: 2022-01-03

## 2022-01-05 ENCOUNTER — HOSPITAL ENCOUNTER (OUTPATIENT)
Dept: PHYSICAL THERAPY | Facility: HOSPITAL | Age: 47
Setting detail: THERAPIES SERIES
Discharge: HOME | End: 2022-01-05

## 2022-01-05 DIAGNOSIS — Z91.81 AT RISK FOR FALLS: ICD-10-CM

## 2022-01-05 DIAGNOSIS — R26.89 BALANCE PROBLEM: ICD-10-CM

## 2022-01-05 DIAGNOSIS — M72.2 PLANTAR FASCIITIS: ICD-10-CM

## 2022-01-05 DIAGNOSIS — G35 MS (MULTIPLE SCLEROSIS): Primary | ICD-10-CM

## 2022-01-05 PROCEDURE — 97110 THERAPEUTIC EXERCISES: CPT

## 2022-01-05 PROCEDURE — 97112 NEUROMUSCULAR REEDUCATION: CPT

## 2022-01-05 PROCEDURE — 97116 GAIT TRAINING THERAPY: CPT

## 2022-01-05 NOTE — THERAPY TREATMENT NOTE
Outpatient Physical Therapy Ortho Treatment Note  [facility]     Patient Name: Jenn Good  : 1975  MRN: [id number]  Today's Date: 2022      Visit Date: 2022     Patient has attended 10 visits  60% Improvement  MD Visit: 05-  Recheck Date: 2022    Therapy Diagnosis: MS/Decreased Balance and safety      Visit Dx:    ICD-10-CM ICD-9-CM   1. MS (multiple sclerosis) ([facility])  G35 340   2. Balance problem  R26.89 781.99   3. At risk for falls  Z91.81 V15.88   4. Plantar fasciitis  M72.2 728.71       Patient Active Problem List   Diagnosis   • Rash   • Gastroesophageal reflux disease   • Primary insomnia   • Hormone replacement therapy   • Hypertension   • Low back pain with right-sided sciatica   • Candida, oral   • Candida vaginitis   • Nausea and vomiting   • Acute URI   • Multiple sclerosis (HCC)   • Impacted cerumen of right ear   • Chronic low back pain   • Acute sinusitis   • Sinus headache   • Vitamin D deficiency   • Neuropathic pain   • Depression   • Hypersomnia, suspected KACIE   • Fatigue   • Hypertensive renal disease        Past Medical History:   Diagnosis Date   • Abdominal pain, right upper quadrant    • Acid reflux    • Acute maxillary sinusitis    • Acute otitis media, left    • Acute pharyngitis    • Acute sinusitis    • Acute upper respiratory infection    • Allergic rhinitis    • Anxiety    • Anxiety state    • Arthritis    • Backache    • Breast tenderness    • Bronchitis    • Callus    • Candidiasis of mouth    • Constipation    • Depression    • Dysuria    • Elevated cholesterol    • Essential hypertension    • GERD (gastroesophageal reflux disease)    • High blood pressure    • Hypertension    • Impacted cerumen    • Ingrown toenail    • Insomnia    • Local infection of the skin and subcutaneous tissue, unspecified     R external ear      • Low back pain     with radiculopathy L buttock and posterior leg      • Malaise and fatigue    • Migraine with aura     • Migraines    • MS (multiple sclerosis) (HCC)    • Multiple sclerosis (HCC)    • Palpitations    • Tinea corporis    • Vaginal discharge    • Vulvovaginitis     yeast infection        Past Surgical History:   Procedure Laterality Date   •  SECTION     • HYSTERECTOMY     • INJECTION OF MEDICATION  2016    Celestone (betamethasone) (1)      • INJECTION OF MEDICATION  05/10/2016    Rocephin (1)      • INJECTION OF MEDICATION  2015    Toradol (1)      • INJECTION OF MEDICATION  2015    Zofran (1)      • TUBAL ABDOMINAL LIGATION      Examination under anesthesia and laparoscopic tubal.   • UPPER GASTROINTESTINAL ENDOSCOPY  2015    Hiatal hernia. Gastritis. Unspecified gastric ulcer. Performed at [facility].        PT Ortho     Row Name 22 1000       Subjective Comments    Subjective Comments states that she is feeling some better. the doctor doesn't think she needed a covid test and told her over the phone he thought it was just allergies. she is scheduled to have a covid  -       Precautions and Contraindications    Precautions/Limitations fall precautions  -       Subjective Pain    Able to rate subjective pain? yes  -    Pre-Treatment Pain Level 3  -          User Key  (r) = Recorded By, (t) = Taken By, (c) = Cosigned By    Initials Name Provider Type     Lucila Araujo PTA Physical Therapy Assistant                             PT Assessment/Plan     Row Name 22 1100          PT Assessment    Assessment Comments during gait when taking curves to loop back around for the lap, patient experiences some off balance and reports feeling of dizziness and off balance and states she feels like she is going to fall. it is found that when she is completing Cspine ROM for rotation with a fixed gaze and wearing her glasses she experiences the same thing. When she is not wearing her glasses and completes the same task, she has no loss of balance or feeling of off  balance or dizziness with this task or with walking and taking curves while walking. still needs motor planning cues throughout. is able to squat down to  an object from floor and then reach away from the body and across midline about 12 inches with minimal to no loss of balance with patient self correcting when she does bobble, but clinician at stand by assist for safety. completed mopping simulation for balance and motor planning. side stepping, retro stepping, box stepping, and zig zag/diagonoal stepping for baance and motor planning. patient needs cueing to slow down and think about task and plan ahead for task. needs cueing with sit to stand activities to stop once she is standing to make sure she has her balance before moving in any direction.  -            PT Plan    PT Frequency 2x/week  -     PT Plan Comments continue laterla stepping, zig zag, add box stepping counter clockwise next visit and continue clock wise.  -           User Key  (r) = Recorded By, (t) = Taken By, (c) = Cosigned By    Initials Name Provider Type     Lucila Araujo PTA Physical Therapy Assistant                   OP Exercises     Row Name 01/05/22 1000             Subjective Comments    Subjective Comments states that she is feeling some better. the doctor doesn't think she needed a covid test and told her over the phone he thought it was just allergies. she is scheduled to have a covid  -              Subjective Pain    Able to rate subjective pain? yes  -      Pre-Treatment Pain Level 3  -      Post-Treatment Pain Level 0  -              Exercise 1    Exercise Name 1 Pro II UE/LE for ROM, endurance, strength  -      Time 1 10 minutes  -      Additional Comments L 10.0  -              Exercise 2    Exercise Name 2 Gait Training with SC and appropriate sequence  -              Exercise 3    Exercise Name 3 Standing Eyes Fixed Gaze (Glasses on) Cspine Rotation, Flex/Ext  -      Additional Comments off  balance/dizzy with Rotation  -              Exercise 4    Exercise Name 4 Standing Eyes Fixed Gaze (No Glasses) AROM Cspine Flex/Ext, B Rot  -      Additional Comments no dizzy/off balance with Rot  -              Exercise 5    Exercise Name 5 B Step Ups Fwd  -      Reps 5 20  -      Additional Comments 6 inch  -              Exercise 6    Exercise Name 6 B Step Ups Lat  -      Reps 6 20  -      Additional Comments 6 inch  -              Exercise 7    Exercise Name 7 B Ecc Step Downs  -              Exercise 8    Exercise Name 8 Squat to  clothespin, and then reach out away and across midline of body to place on table  -              Exercise 9    Exercise Name 9 sit to stand and transfer to chair sitting turned 45 degrees from starting chair  -              Exercise 10    Exercise Name 10 Stand up, turn around 360, sit back down  -              Exercise 11    Exercise Name 11 Mopping Simulation  -              Exercise 12    Exercise Name 12 lateral stepping  -      Additional Comments to right and left  -              Exercise 13    Exercise Name 13 retro walking/fwd walking  -              Exercise 14    Exercise Name 14 Box Step  -      Additional Comments Clockwise only today  -              Exercise 15    Exercise Name 15 Diagonal Stepping (Zig Zag)  -            User Key  (r) = Recorded By, (t) = Taken By, (c) = Cosigned By    Initials Name Provider Type    Lucila Keita, PTA Physical Therapy Assistant                              PT OP Goals     Row Name 01/05/22 1000          PT Short Term Goals    STG 1 Patient I with HEP and have additions/changes by next recertification.  -     STG 1 Progress Met  [facility]     STG 2 Patient able to ambulate 75 feet with SC and no knee giving out and no LOB.  -     STG 2 Progress Met  [facility]     STG 3 Patient to obtain SC and utilize outside of home for improved safety.  -     STG 3 Progress Met  [facility]     STG 4 Patient able  to perform sit to/from stand with 1 UE A = WB B LE x5, with good eccentric control in <= 15 seconds.  -     STG 4 Progress Ongoing  -     STG 5 TUG in <= 30 seconds with SC assistive device, good eccentric control and safely with no LOB.  -     STG 5 Progress Partially Met; Ongoing  -     STG 6 Rhomberg EO >= 10 seconds.  -     STG 6 Progress Met  -     STG 7 Stanbdard stance EO no UE support >= 30 seconds.  -     STG 7 Progress Met  -            Long Term Goals    LTG 1 B LE 5/5.  -     LTG 1 Progress Met  -     LTG 2 Patient able to ascend/descend 3 steps reciprocally with 1 hand rail assist and SC assist x5 reps safely.  -     LTG 2 Progress Met  -     LTG 3 Patient able to ambulate with SC assistive device non-antalgic >= 150 feet, safely.  -     LTG 3 Progress Met  -     LTG 4 TUG in <= 20 seconds with SC assistive device, good eccentric control and safely with no LOB.  -     LTG 5 Patient able to perform UE limb reaching 12 inches from midline body with arm outstretched in multiple planes and across midline with no LOB.  -     LTG 5 Progress Partially Met  -     LTG 5 Progress Comments SBA of clinician for saety  -     LTG 6 Rhomberg EO >= 20 seconds.  -     LTG 6 Progress Met  -     LTG 7 Patient to be I with final HEP.  -     LTG 7 Progress Ongoing  -            Time Calculation    PT Goal Re-Cert Due Date 01/11/22  -           User Key  (r) = Recorded By, (t) = Taken By, (c) = Cosigned By    Initials Name Provider Type     Lucila Araujo PTA Physical Therapy Assistant                               Time Calculation:   Start Time: 1043  Stop Time: 1202  Time Calculation (min): 79 min  Total Timed Code Minutes- PT: 79 minute(s)  Therapy Charges for Today     Code Description Service Date Service Provider Modifiers Qty    89087336531 HC PT THER PROC EA 15 MIN 1/5/2022 Lucila Araujo PTA GP 1    88827738194 HC PT THER SUPP EA 15 MIN 1/5/2022 Lucila Araujo PTA  GP 1    52147167094 HC GAIT TRAINING EA 15 MIN 1/5/2022 Lucila Araujo PTA GP 1    97947556184 HC PT NEUROMUSC RE EDUCATION EA 15 MIN 1/5/2022 Lucila Araujo PTA GP 3                    Lucila Araujo, DENISE  1/5/2022         This document has been electronically signed by Lucila Araujo PTA on January 5, 2022 12:11 CST

## 2022-01-10 ENCOUNTER — HOSPITAL ENCOUNTER (OUTPATIENT)
Dept: PHYSICAL THERAPY | Facility: HOSPITAL | Age: 47
Setting detail: THERAPIES SERIES
Discharge: HOME | End: 2022-01-10

## 2022-01-10 DIAGNOSIS — R26.89 BALANCE PROBLEM: ICD-10-CM

## 2022-01-10 DIAGNOSIS — G35 MS (MULTIPLE SCLEROSIS): Primary | ICD-10-CM

## 2022-01-10 DIAGNOSIS — Z91.81 AT RISK FOR FALLS: ICD-10-CM

## 2022-01-10 PROCEDURE — 97530 THERAPEUTIC ACTIVITIES: CPT

## 2022-01-10 PROCEDURE — 97116 GAIT TRAINING THERAPY: CPT

## 2022-01-10 PROCEDURE — 97110 THERAPEUTIC EXERCISES: CPT

## 2022-01-10 NOTE — THERAPY TREATMENT NOTE
Outpatient Physical Therapy Ortho Treatment Note  [facility]     Patient Name: Jenn Good  : 1975  MRN: [id number]  Today's Date: 1/10/2022      Visit Date: 01/10/2022     Patient has attended 11 visits  60% Improvement  MD Visit: 05-  Recheck Date: 2022    Therapy Diagnosis: MS/Decreased Balance and Safety      Visit Dx:    ICD-10-CM ICD-9-CM   1. MS (multiple sclerosis) ([facility])  G35 340   2. Balance problem  R26.89 781.99   3. At risk for falls  Z91.81 V15.88       Patient Active Problem List   Diagnosis   • Rash   • Gastroesophageal reflux disease   • Primary insomnia   • Hormone replacement therapy   • Hypertension   • Low back pain with right-sided sciatica   • Candida, oral   • Candida vaginitis   • Nausea and vomiting   • Acute URI   • Multiple sclerosis (HCC)   • Impacted cerumen of right ear   • Chronic low back pain   • Acute sinusitis   • Sinus headache   • Vitamin D deficiency   • Neuropathic pain   • Depression   • Hypersomnia, suspected KACIE   • Fatigue   • Hypertensive renal disease        Past Medical History:   Diagnosis Date   • Abdominal pain, right upper quadrant    • Acid reflux    • Acute maxillary sinusitis    • Acute otitis media, left    • Acute pharyngitis    • Acute sinusitis    • Acute upper respiratory infection    • Allergic rhinitis    • Anxiety    • Anxiety state    • Arthritis    • Backache    • Breast tenderness    • Bronchitis    • Callus    • Candidiasis of mouth    • Constipation    • Depression    • Dysuria    • Elevated cholesterol    • Essential hypertension    • GERD (gastroesophageal reflux disease)    • High blood pressure    • Hypertension    • Impacted cerumen    • Ingrown toenail    • Insomnia    • Local infection of the skin and subcutaneous tissue, unspecified     R external ear      • Low back pain     with radiculopathy L buttock and posterior leg      • Malaise and fatigue    • Migraine with aura    • Migraines    • MS (multiple  sclerosis) (HCC)    • Multiple sclerosis (HCC)    • Palpitations    • Tinea corporis    • Vaginal discharge    • Vulvovaginitis     yeast infection        Past Surgical History:   Procedure Laterality Date   •  SECTION     • HYSTERECTOMY     • INJECTION OF MEDICATION  2016    Celestone (betamethasone) (1)      • INJECTION OF MEDICATION  05/10/2016    Rocephin (1)      • INJECTION OF MEDICATION  2015    Toradol (1)      • INJECTION OF MEDICATION  2015    Zofran (1)      • TUBAL ABDOMINAL LIGATION      Examination under anesthesia and laparoscopic tubal.   • UPPER GASTROINTESTINAL ENDOSCOPY  2015    Hiatal hernia. Gastritis. Unspecified gastric ulcer. Performed at [facility].        PT Ortho     Row Name 01/10/22 1100       Subjective Comments    Subjective Comments had a fall yesterday. was carrying 2 jugs of water in and slipped coming in. wasn't because knee gave out but reports she has still been having issues with the knee giving way.  -       Precautions and Contraindications    Precautions/Limitations fall precautions  -       Subjective Pain    Able to rate subjective pain? yes  -    Pre-Treatment Pain Level 0  -       Posture/Observations    Posture/Observations Comments ambulates with inconsistent use of standard cane into clinic  -          User Key  (r) = Recorded By, (t) = Taken By, (c) = Cosigned By    Initials Name Provider Type     Lucila Araujo PTA Physical Therapy Assistant                             PT Assessment/Plan     Row Name 01/10/22 1100          PT Assessment    Assessment Comments patient has improved tug test today with standard cane and good safety awareness. no LOB during treatment and is improved iwth her Sit to  15 seconds. she achieves 5 reps with one trial but is inconsistently hitting that goal.  -            PT Plan    PT Frequency 2x/week  -     PT Plan Comments recheck next visit, possible DC with nearing all  goals  -           User Key  (r) = Recorded By, (t) = Taken By, (c) = Cosigned By    Initials Name Provider Type    Lucila Keita PTA Physical Therapy Assistant                   OP Exercises     Row Name 01/10/22 1100             Subjective Comments    Subjective Comments had a fall yesterday. was carrying 2 jugs of water in and slipped coming in. wasn't because knee gave out but reports she has still been having issues with the knee giving way.  -              Subjective Pain    Able to rate subjective pain? yes  -      Pre-Treatment Pain Level 0  -      Post-Treatment Pain Level 0  -              Exercise 1    Exercise Name 1 Gait Training with SC  -              Exercise 2    Exercise Name 2 EFX  -      Time 2 10 minutes  -      Additional Comments Ramp 4; Resistance 4  -              Exercise 3    Exercise Name 3 Sit to/from stand with 1 UE 15 seconds  -      Sets 3 6 trials  -      Reps 3 4 reps, 4.5 reps, 4 reps, 5 reps, 4 reps, 4.5 reps  -      Time 3 15 seconds each trial  -              Exercise 4    Exercise Name 4 Tug Test with Cane  -      Time 4 17 seconds, 17 seconds, 20 seconds  -              Exercise 5    Exercise Name 5 airx beam fwd with MS  -              Exercise 6    Exercise Name 6 airx beam lat with MS  -            User Key  (r) = Recorded By, (t) = Taken By, (c) = Cosigned By    Initials Name Provider Type    Lucila Keita PTA Physical Therapy Assistant                              PT OP Goals     Row Name 01/10/22 1100          PT Short Term Goals    STG 1 Patient I with HEP and have additions/changes by next recertification.  -     STG 1 Progress Met  -     STG 2 Patient able to ambulate 75 feet with SC and no knee giving out and no LOB.  -     STG 2 Progress Met  [facility]     STG 3 Patient to obtain SC and utilize outside of home for improved safety.  -     STG 3 Progress Met  [facility]     STG 4 Patient able to perform sit to/from stand with 1  UE A = WB B LE x5, with good eccentric control in <= 15 seconds.  -     STG 4 Progress Ongoing  -     STG 5 TUG in <= 30 seconds with SC assistive device, good eccentric control and safely with no LOB.  -     STG 5 Progress Met  -     STG 6 Rhomberg EO >= 10 seconds.  -     STG 6 Progress Met  -     STG 7 Stanbdard stance EO no UE support >= 30 seconds.  -     STG 7 Progress Met  -            Long Term Goals    LTG 1 B LE 5/5.  -     LTG 1 Progress Met  -     LTG 2 Patient able to ascend/descend 3 steps reciprocally with 1 hand rail assist and SC assist x5 reps safely.  -     LTG 2 Progress Met  -     LTG 3 Patient able to ambulate with SC assistive device non-antalgic >= 150 feet, safely.  -     LTG 3 Progress Met  -     LTG 4 TUG in <= 20 seconds with SC assistive device, good eccentric control and safely with no LOB.  -     LTG 4 Progress Met  -     LTG 5 Patient able to perform UE limb reaching 12 inches from midline body with arm outstretched in multiple planes and across midline with no LOB.  -     LTG 5 Progress Partially Met  -     LTG 6 Rhomberg EO >= 20 seconds.  -     LTG 6 Progress Met  -     LTG 7 Patient to be I with final HEP.  -     LTG 7 Progress Ongoing  -            Time Calculation    PT Goal Re-Cert Due Date 01/11/22  -           User Key  (r) = Recorded By, (t) = Taken By, (c) = Cosigned By    Initials Name Provider Type     Lucila Araujo PTA Physical Therapy Assistant                               Time Calculation:   Start Time: 1129  Stop Time: 1216  Time Calculation (min): 47 min  Total Timed Code Minutes- PT: 47 minute(s)  Therapy Charges for Today     Code Description Service Date Service Provider Modifiers Qty    51885190907 HC PT THER PROC EA 15 MIN 1/10/2022 Lucila Araujo PTA GP, CQ 1    54382981219 HC PT THER SUPP EA 15 MIN 1/10/2022 Lucila Araujo PTA GP, CQ 1    53162412389 HC GAIT TRAINING EA 15 MIN 1/10/2022 Lucila Araujo PTA  GP, CQ 1    95632733984  PT THERAPEUTIC ACT EA 15 MIN 1/10/2022 Lucila Araujo PTA GP, CQ 1                    Lucila Araujo, DENISE  1/10/2022       This document has been electronically signed by Lucila Araujo PTA on January 10, 2022 13:43 CST

## 2022-01-12 ENCOUNTER — HOSPITAL ENCOUNTER (OUTPATIENT)
Dept: PHYSICAL THERAPY | Facility: HOSPITAL | Age: 47
Setting detail: THERAPIES SERIES
Discharge: HOME | End: 2022-01-12

## 2022-01-12 DIAGNOSIS — Z91.81 AT RISK FOR FALLS: ICD-10-CM

## 2022-01-12 DIAGNOSIS — G35 MS (MULTIPLE SCLEROSIS): Primary | ICD-10-CM

## 2022-01-12 DIAGNOSIS — G89.29 OTHER CHRONIC PAIN: ICD-10-CM

## 2022-01-12 DIAGNOSIS — G89.29 CHRONIC PAIN OF RIGHT KNEE: ICD-10-CM

## 2022-01-12 DIAGNOSIS — M25.561 CHRONIC PAIN OF RIGHT KNEE: ICD-10-CM

## 2022-01-12 DIAGNOSIS — R26.89 BALANCE PROBLEM: ICD-10-CM

## 2022-01-12 PROCEDURE — 97110 THERAPEUTIC EXERCISES: CPT

## 2022-01-12 PROCEDURE — 97530 THERAPEUTIC ACTIVITIES: CPT | Performed by: PHYSICAL THERAPIST

## 2022-01-12 RX ORDER — HYDROCODONE BITARTRATE AND ACETAMINOPHEN 7.5; 325 MG/1; MG/1
1 TABLET ORAL 2 TIMES DAILY PRN
Qty: 60 TABLET | Refills: 0 | OUTPATIENT
Start: 2022-01-12

## 2022-01-12 NOTE — THERAPY DISCHARGE NOTE
Outpatient Physical Therapy Ortho Progress Note/Discharge Summary  [facility]     Patient Name: Jenn Good  : 1975  MRN: [id number]  Today's Date: 2022      Visit Date: 2022     Patient seen for 12 PT sessions.  Patient reports 95% of improvement.  Next MD appt: 05/10/2022.  Recertification: N/A.    Therapy Diagnosis: MS/Decreased Balance and Safety        Visit Dx:    ICD-10-CM ICD-9-CM   1. MS (multiple sclerosis) ([facility])  G35 340   2. Balance problem  R26.89 781.99   3. At risk for falls  Z91.81 V15.88       Patient Active Problem List   Diagnosis   • Rash   • Gastroesophageal reflux disease   • Primary insomnia   • Hormone replacement therapy   • Hypertension   • Low back pain with right-sided sciatica   • Candida, oral   • Candida vaginitis   • Nausea and vomiting   • Acute URI   • Multiple sclerosis (HCC)   • Impacted cerumen of right ear   • Chronic low back pain   • Acute sinusitis   • Sinus headache   • Vitamin D deficiency   • Neuropathic pain   • Depression   • Hypersomnia, suspected KACIE   • Fatigue   • Hypertensive renal disease        Past Medical History:   Diagnosis Date   • Abdominal pain, right upper quadrant    • Acid reflux    • Acute maxillary sinusitis    • Acute otitis media, left    • Acute pharyngitis    • Acute sinusitis    • Acute upper respiratory infection    • Allergic rhinitis    • Anxiety    • Anxiety state    • Arthritis    • Backache    • Breast tenderness    • Bronchitis    • Callus    • Candidiasis of mouth    • Constipation    • Depression    • Dysuria    • Elevated cholesterol    • Essential hypertension    • GERD (gastroesophageal reflux disease)    • High blood pressure    • Hypertension    • Impacted cerumen    • Ingrown toenail    • Insomnia    • Local infection of the skin and subcutaneous tissue, unspecified     R external ear      • Low back pain     with radiculopathy L buttock and posterior leg      • Malaise and fatigue    • Migraine with  aura    • Migraines    • MS (multiple sclerosis) (HCC)    • Multiple sclerosis (HCC)    • Palpitations    • Tinea corporis    • Vaginal discharge    • Vulvovaginitis     yeast infection        Past Surgical History:   Procedure Laterality Date   •  SECTION     • HYSTERECTOMY     • INJECTION OF MEDICATION  2016    Celestone (betamethasone) (1)      • INJECTION OF MEDICATION  05/10/2016    Rocephin (1)      • INJECTION OF MEDICATION  2015    Toradol (1)      • INJECTION OF MEDICATION  2015    Zofran (1)      • TUBAL ABDOMINAL LIGATION      Examination under anesthesia and laparoscopic tubal.   • UPPER GASTROINTESTINAL ENDOSCOPY  2015    Hiatal hernia. Gastritis. Unspecified gastric ulcer. Performed at [facility].            PT Neuro     Row Name 22 1300             Subjective Comments    Subjective Comments Patient reports she can tell she has gotten better. She rpeorts she has joined 2 gyms. She rpeorts she uses the cane at home sometimes and always when she leaves the home.  -AJ              Precautions and Contraindications    Precautions/Limitations fall precautions  -AJ              Subjective Pain    Able to rate subjective pain? yes  -AJ      Pre-Treatment Pain Level 0  -AJ              Home Living    Home Equipment Cane  -AJ              Vision-Basic Assessment    Current Vision Wears glasses all the time  -AJ      Patient Visual Report Balance difficulty; Blurring of vision when changing focal distance  -AJ              Cognition    Overall Cognitive Status WFL  -AJ      Arousal/Alertness Appropriate responses to stimuli  -AJ      Memory Appears intact  -AJ      Orientation Level Oriented X4  -AJ      Safety Judgment Good awareness of safety precautions  -AJ      Deficits Fully aware of deficits  -AJ              Sensation    Sensation WNL? WFL  -AJ      Light Touch No apparent deficits  -AJ              Posture/Observations    Alignment Options Forward head;  "Thoracic kyphosis; Lumbar lordosis; Foot pronation; Pes Planus  -AJ      Forward Head Mild; Increased  -AJ      Thoracic Kyphosis Mild; Increased  -AJ      Lumbar lordosis Normal  -AJ      Foot pronation Bilateral:; Mild; Moderate; Increased  -AJ      Pes Planus Bilateral:; Mild; Moderate; Increased  -AJ      Posture/Observations Comments No distress.  -AJ              Coordination    Rapid Alternating Bilteral:; Impaired  -AJ      Finger to Nose Eyes Open Bilteral:; Intact  -AJ      Finger to Nose Eyes Closed Bilteral:; Intact  -AJ      Crossing Midline Bilteral:; Intact  -AJ      Bilateral Integration Bilteral:; Intact  -AJ              General ROM    GENERAL ROM COMMENTS AROM for B UE/LE/Trunk all WNL  -AJ              MMT (Manual Muscle Testing)    General MMT Comments B UE 5/5. B LE 5/5.  -AJ              Tone    UE Tone bilateral:; WNL for age  -AJ      LE Tone bilateral:; WNL for age  -AJ      Trunk Tone WNL for age  -AJ              Transfers    Bed-Chair [place] (Transfers) independent  -AJ      Chair-Bed [place] (Transfers) independent  -AJ      Sit-Stand [place] (Transfers) independent  -AJ      Stand-Sit [place] (Transfers) independent  -AJ      Transfer, Safety Issues balance decreased during turns; sequencing ability decreased; knees buckling; impulsivity  -AJ      Comment (Transfers) Good eccentric control stand to sit. Does get off balance with turning bur las learned to go slow and utilize SC t ohelp dsteady herself to be safe.  -AJ              Gait/Stairs (Locomotion)    [place] Level (Gait) modified independence  -AJ      Assistive Device (Gait) cane, straight  -AJ      Pattern (Gait) 3-point; step-through  -AJ      Bilateral Gait Deviations knee buckling, bilateral  -AJ              Balance Skills Training    SLS 1-3\" each LE  -AJ      Rhomberg EO/EC: 60 seconds each  -AJ      Sharpened Rhomberg R :24\", L\" 3-5\"  -AJ            User Key  (r) = Recorded By, (t) = Taken By, " (c) = Cosigned By    Initials Name Provider Type    Dayanna Blackwood, PT DPT Physical Therapist                 Barriers to Rehab: Include significant or possible arthritic/degenerative changes that have occurred within the joint/spine, neurologic degenerative condition, The chronicity of this issue, The patient's obesity.     Safety Issues: Fall risk         PT Assessment/Plan     Row Name 01/12/22 1300          PT Assessment    Functional Limitations Impaired gait; Impaired locomotion; Limitation in home management; Limitations in community activities; Performance in leisure activities; Performance in self-care ADL  -     Impairments Balance; Endurance; Impaired muscle endurance; Muscle strength  -AJ     Assessment Comments Patient has met all goals and I wit final HEP.  -AJ     Rehab Potential Fair  -     Patient/caregiver participated in establishment of treatment plan and goals Yes  -     Patient would benefit from skilled therapy intervention No  -            PT Plan    PT Frequency --  N/A  -     PT Plan Comments D/C today with final HEP.  -           User Key  (r) = Recorded By, (t) = Taken By, (c) = Cosigned By    Initials Name Provider Type    Dayanna Blackwood, PT DPT Physical Therapist                     OP Exercises     Row Name 01/12/22 1300             Subjective Comments    Subjective Comments Patient reports she can tell she has gotten better. She rpeorts she has joined 2 gyms. She rpeorts she uses the cane at home sometimes and always when she leaves the home.  -              Subjective Pain    Able to rate subjective pain? yes  -      Pre-Treatment Pain Level 0  -              Exercise 1    Exercise Name 1 TUG  -      Reps 1 2  -      Additional Comments With SC  -              Exercise 2    Exercise Name 2 Rhomberg EO/EC  -      Reps 2 2 each  -              Exercise 3    Exercise Name 3 Sharpened Rhomberg  -AJ      Additional Comments each LE lead  -AJ               Exercise 4    Exercise Name 4 Sit to/from stand  -      Reps 4 5  -      Additional Comments 15 seconds, 1 UE A  -              Exercise 5    Exercise Name 5 measurements  -              Exercise 6    Exercise Name 6 EFX  -      Time 6 10 minutes  -      Additional Comments Ramp 4 Resistance 4  -              Exercise 7    Exercise Name 7 B St.Hip ABD  -      Reps 7 20  -              Exercise 8    Exercise Name 8 B St. Hip Ext  -      Reps 8 20  -              Exercise 9    Exercise Name 9 Mini Squats  -      Reps 9 20  -              Exercise 10    Exercise Name 10 Bridges  -      Reps 10 20  -      Time 10 5 sec hold  -              Exercise 11    Exercise Name 11 B SLR Fwd Flexion  -      Reps 11 20  -      Time 11 5 sec hold  -              Exercise 12    Exercise Name 12 St. Toe Raises  -      Reps 12 20  -            User Key  (r) = Recorded By, (t) = Taken By, (c) = Cosigned By    Initials Name Provider Type     Lucila Araujo, PTA Physical Therapy Assistant    Dayanna Blackwood, PT DPT Physical Therapist                                PT OP Goals     Row Name 01/12/22 1300          PT Short Term Goals    STG 1 Patient I with HEP and have additions/changes by next recertification.  -     STG 1 Progress Met  -     STG 2 Patient able to ambulate 75 feet with SC and no knee giving out and no LOB.  -     STG 2 Progress Met  -     STG 3 Patient to obtain SC and utilize outside of home for improved safety.  -     STG 3 Progress Met  -     STG 4 Patient able to perform sit to/from stand with 1 UE A = WB B LE x5, with good eccentric control in <= 15 seconds.  -     STG 4 Progress Ongoing  -     STG 5 TUG in <= 30 seconds with SC assistive device, good eccentric control and safely with no LOB.  -     STG 5 Progress Met  -     STG 6 Rhomberg EO >= 10 seconds.  -     STG 6 Progress Met  -     STG 7 Stanbdard stance EO no UE support  >= 30 seconds.  -     STG 7 Progress Met  -AJ            Long Term Goals    LTG 1 B LE 5/5.  -     LTG 1 Progress Met  -AJ     LTG 2 Patient able to ascend/descend 3 steps reciprocally with 1 hand rail assist and SC assist x5 reps safely.  -     LTG 2 Progress Met  -AJ     LTG 3 Patient able to ambulate with SC assistive device non-antalgic >= 150 feet, safely.  -     LTG 3 Progress Met  -     LTG 4 TUG in <= 20 seconds with SC assistive device, good eccentric control and safely with no LOB.  -     LTG 4 Progress Met  -     LTG 5 Patient able to perform UE limb reaching 12 inches from midline body with arm outstretched in multiple planes and across midline with no LOB.  -     LTG 5 Progress Met  -     LTG 6 Rhomberg EO >= 20 seconds.  -     LTG 6 Progress Met  -     LTG 7 Patient to be I with final HEP.  -     LTG 7 Progress Met  -            Time Calculation    PT Goal Re-Cert Due Date --  N/A  -           User Key  (r) = Recorded By, (t) = Taken By, (c) = Cosigned By    Initials Name Provider Type    Dayanna Blackwood, PT DPT Physical Therapist                Therapy Education  Given: HEP, Symptoms/condition management, Fall prevention and home safety (POC)  Program: Reinforced, Progressed  How Provided: Verbal, Demonstration  Provided to: Patient  Level of Understanding: Teach back education performed, Verbalized, Demonstrated    Outcome Measure Options: Lower Extremity Functional Scale (LEFS), Timed Up and Go (TUG), 25 Foot Walk, 5x Sit to Stand  5 Times Sit to Stand  5 Times Sit to Stand (seconds): 15 seconds  5 Times Sit to Stand Comments: 1 UE A, good eccentric control  Lower Extremity Functional Index  Any of your usual work, housework or school activities: A little bit of difficulty  Your usual hobbies, recreational or sporting activities: Quite a bit of difficulty  Getting into or out of the bath: Quite a bit of difficulty  Walking between rooms: A little bit of  difficulty  Putting on your shoes or socks: A little bit of difficulty  Squatting: Moderate difficulty  Lifting an object, like a bag of groceries from the floor: Moderate difficulty  Performing light activities around your home: Moderate difficulty  Performing heavy activities around your home: Quite a bit of difficulty  Getting into or out of a car: A little bit of difficulty  Walking 2 blocks: A little bit of difficulty  Walking a mile: A little bit of difficulty  Going up or down 10 stairs (about 1 flight of stairs): A little bit of difficulty  Standing for 1 hour: A little bit of difficulty  Sitting for 1 hour: No difficulty  Running on even ground: Quite a bit of difficulty  Running on uneven ground: Quite a bit of difficulty  Making sharp turns while running fast: Extreme difficulty or unable to perform activity  Hopping: Extreme difficulty or unable to perform activity  Rolling over in bed: No difficulty  Total: 43  Timed Up and Go (TUG)  TUG Test 1: 15 seconds  TUG Test 2: 17 seconds  Timed Up and Go Comments: Utilizing SC, 1 UE A sit to.from stand with good eccentric control.      Time Calculation:   Start Time: 1300  Stop Time: 1344  Time Calculation (min): 44 min  Total Timed Code Minutes- PT: 44 minute(s)  Therapy Charges for Today     Code Description Service Date Service Provider Modifiers Qty    87879427184 HC PT THER SUPP EA 15 MIN 1/12/2022 Dayanna Naqvi, PT DPT GP 2    96089355369 HC PT THERAPEUTIC ACT EA 15 MIN 1/12/2022 Dayanna Naqvi, PT DPT GP 1      x2 There ex- MH  x1 There sup- MH    PT G-Codes  Outcome Measure Options: Lower Extremity Functional Scale (LEFS), Timed Up and Go (TUG), 25 Foot Walk, 5x Sit to Stand  Total: 43  TUG Test 1: 15 seconds  TUG Test 2: 17 seconds     OP PT Discharge Summary  Date of Discharge: 01/12/22  Reason for Discharge: All goals achieved, Independent  Outcomes Achieved: Able to achieve all goals within established timeline, Refer to plan of care  for updates on goals achieved  Discharge Destination: Home with home program    This document has been electronically signed by Dayanna Naqvi, PT DPT, CSCS on January 12, 2022 13:44 CST

## 2022-01-13 ENCOUNTER — INFUSION (OUTPATIENT)
Dept: ONCOLOGY | Facility: HOSPITAL | Age: 47
End: 2022-01-13

## 2022-01-13 VITALS — SYSTOLIC BLOOD PRESSURE: 129 MMHG | TEMPERATURE: 97.7 F | HEART RATE: 72 BPM | DIASTOLIC BLOOD PRESSURE: 64 MMHG

## 2022-01-13 DIAGNOSIS — G35 MULTIPLE SCLEROSIS: Primary | ICD-10-CM

## 2022-01-13 PROCEDURE — 25010000002 NATALIZUMAB PER 1 MG: Performed by: NURSE PRACTITIONER

## 2022-01-13 PROCEDURE — 96365 THER/PROPH/DIAG IV INF INIT: CPT | Performed by: NURSE PRACTITIONER

## 2022-01-13 RX ORDER — ONDANSETRON 4 MG/1
4 TABLET, FILM COATED ORAL ONCE
Status: DISCONTINUED | OUTPATIENT
Start: 2022-01-13 | End: 2022-01-13 | Stop reason: HOSPADM

## 2022-01-13 RX ORDER — ACETAMINOPHEN 325 MG/1
650 TABLET ORAL AS NEEDED
Status: CANCELLED
Start: 2022-02-10

## 2022-01-13 RX ORDER — LORATADINE 10 MG/1
10 TABLET ORAL DAILY
Status: CANCELLED
Start: 2022-02-10

## 2022-01-13 RX ORDER — ONDANSETRON 2 MG/ML
4 INJECTION INTRAMUSCULAR; INTRAVENOUS AS NEEDED
Status: CANCELLED
Start: 2022-02-10

## 2022-01-13 RX ORDER — ONDANSETRON HYDROCHLORIDE 4 MG/5ML
4 SOLUTION ORAL ONCE
Status: CANCELLED
Start: 2022-02-10 | End: 2022-02-10

## 2022-01-13 RX ORDER — LORATADINE 10 MG/1
10 TABLET ORAL DAILY
Status: DISCONTINUED | OUTPATIENT
Start: 2022-01-13 | End: 2022-01-13 | Stop reason: HOSPADM

## 2022-01-13 RX ORDER — DIPHENHYDRAMINE HYDROCHLORIDE 50 MG/ML
25 INJECTION INTRAMUSCULAR; INTRAVENOUS AS NEEDED
Status: CANCELLED
Start: 2022-02-10

## 2022-01-13 RX ORDER — ACETAMINOPHEN 325 MG/1
650 TABLET ORAL ONCE
Status: CANCELLED | OUTPATIENT
Start: 2022-02-10

## 2022-01-13 RX ORDER — SODIUM CHLORIDE 9 MG/ML
250 INJECTION, SOLUTION INTRAVENOUS ONCE
Status: CANCELLED | OUTPATIENT
Start: 2022-02-10

## 2022-01-13 RX ORDER — SODIUM CHLORIDE 9 MG/ML
250 INJECTION, SOLUTION INTRAVENOUS ONCE
Status: DISCONTINUED | OUTPATIENT
Start: 2022-01-13 | End: 2022-01-13 | Stop reason: HOSPADM

## 2022-01-13 RX ORDER — IBUPROFEN 400 MG/1
400 TABLET ORAL AS NEEDED
Status: CANCELLED
Start: 2022-02-10

## 2022-01-13 RX ORDER — ACETAMINOPHEN 325 MG/1
650 TABLET ORAL ONCE
Status: DISCONTINUED | OUTPATIENT
Start: 2022-01-13 | End: 2022-01-13 | Stop reason: HOSPADM

## 2022-01-13 RX ADMIN — NATALIZUMAB 300 MG: 300 INJECTION INTRAVENOUS at 11:14

## 2022-01-17 ENCOUNTER — APPOINTMENT (OUTPATIENT)
Dept: PHYSICAL THERAPY | Facility: HOSPITAL | Age: 47
End: 2022-01-17

## 2022-01-19 ENCOUNTER — APPOINTMENT (OUTPATIENT)
Dept: PHYSICAL THERAPY | Facility: HOSPITAL | Age: 47
End: 2022-01-19

## 2022-01-21 RX ORDER — HYDROCODONE BITARTRATE AND ACETAMINOPHEN 7.5; 325 MG/1; MG/1
1 TABLET ORAL 2 TIMES DAILY PRN
Qty: 60 TABLET | Refills: 0 | Status: SHIPPED | OUTPATIENT
Start: 2022-01-21 | End: 2022-02-22 | Stop reason: SDUPTHER

## 2022-01-21 NOTE — TELEPHONE ENCOUNTER
Rx Refill Note  Requested Prescriptions     Refused Prescriptions Disp Refills   • HYDROcodone-acetaminophen (NORCO) 7.5-325 MG per tablet 60 tablet 0     Sig: Take 1 tablet by mouth 2 (Two) Times a Day As Needed for Moderate Pain .     Refused By: MIKE LARSON     Reason for Refusal: Patient has requested refill too soon      Last office visit with prescribing clinician: 12/27/2021      Next office visit with prescribing clinician: 3/28/2022            Beto Riley Rep  01/21/22, 15:21 CST

## 2022-02-10 ENCOUNTER — INFUSION (OUTPATIENT)
Dept: ONCOLOGY | Facility: HOSPITAL | Age: 47
End: 2022-02-10

## 2022-02-10 VITALS
RESPIRATION RATE: 18 BRPM | SYSTOLIC BLOOD PRESSURE: 157 MMHG | DIASTOLIC BLOOD PRESSURE: 88 MMHG | TEMPERATURE: 98.2 F | HEART RATE: 74 BPM

## 2022-02-10 DIAGNOSIS — Z11.59 ENCOUNTER FOR SCREENING FOR OTHER VIRAL DISEASES: Primary | ICD-10-CM

## 2022-02-10 DIAGNOSIS — G35 MULTIPLE SCLEROSIS: ICD-10-CM

## 2022-02-10 LAB
HBV SURFACE AB SER RIA-ACNC: REACTIVE
HBV SURFACE AG SERPL QL IA: NORMAL
WHOLE BLOOD HOLD SPECIMEN: NORMAL

## 2022-02-10 PROCEDURE — 36415 COLL VENOUS BLD VENIPUNCTURE: CPT

## 2022-02-10 PROCEDURE — 86704 HEP B CORE ANTIBODY TOTAL: CPT

## 2022-02-10 PROCEDURE — 25010000002 NATALIZUMAB PER 1 MG: Performed by: NURSE PRACTITIONER

## 2022-02-10 PROCEDURE — 96365 THER/PROPH/DIAG IV INF INIT: CPT | Performed by: NURSE PRACTITIONER

## 2022-02-10 PROCEDURE — 86706 HEP B SURFACE ANTIBODY: CPT | Performed by: NURSE PRACTITIONER

## 2022-02-10 PROCEDURE — 87340 HEPATITIS B SURFACE AG IA: CPT

## 2022-02-10 RX ORDER — ACETAMINOPHEN 325 MG/1
650 TABLET ORAL ONCE
Status: CANCELLED | OUTPATIENT
Start: 2022-03-10

## 2022-02-10 RX ORDER — SODIUM CHLORIDE 9 MG/ML
250 INJECTION, SOLUTION INTRAVENOUS ONCE
Status: CANCELLED | OUTPATIENT
Start: 2022-03-10

## 2022-02-10 RX ORDER — LORATADINE 10 MG/1
10 TABLET ORAL DAILY
Status: DISCONTINUED | OUTPATIENT
Start: 2022-02-10 | End: 2022-02-10 | Stop reason: HOSPADM

## 2022-02-10 RX ORDER — ONDANSETRON 4 MG/1
4 TABLET, FILM COATED ORAL ONCE
Status: DISCONTINUED | OUTPATIENT
Start: 2022-02-10 | End: 2022-02-10 | Stop reason: HOSPADM

## 2022-02-10 RX ORDER — SODIUM CHLORIDE 9 MG/ML
250 INJECTION, SOLUTION INTRAVENOUS ONCE
Status: COMPLETED | OUTPATIENT
Start: 2022-02-10 | End: 2022-02-10

## 2022-02-10 RX ORDER — ACETAMINOPHEN 325 MG/1
650 TABLET ORAL AS NEEDED
Status: CANCELLED
Start: 2022-03-10

## 2022-02-10 RX ORDER — ONDANSETRON 2 MG/ML
4 INJECTION INTRAMUSCULAR; INTRAVENOUS AS NEEDED
Status: CANCELLED
Start: 2022-03-10

## 2022-02-10 RX ORDER — ACETAMINOPHEN 325 MG/1
650 TABLET ORAL ONCE
Status: DISCONTINUED | OUTPATIENT
Start: 2022-02-10 | End: 2022-02-10 | Stop reason: HOSPADM

## 2022-02-10 RX ORDER — IBUPROFEN 400 MG/1
400 TABLET ORAL AS NEEDED
Status: CANCELLED
Start: 2022-03-10

## 2022-02-10 RX ORDER — LORATADINE 10 MG/1
10 TABLET ORAL DAILY
Status: CANCELLED
Start: 2022-03-10

## 2022-02-10 RX ORDER — DIPHENHYDRAMINE HYDROCHLORIDE 50 MG/ML
25 INJECTION INTRAMUSCULAR; INTRAVENOUS AS NEEDED
Status: CANCELLED
Start: 2022-03-10

## 2022-02-10 RX ORDER — ONDANSETRON HYDROCHLORIDE 4 MG/5ML
4 SOLUTION ORAL ONCE
Status: CANCELLED
Start: 2022-03-10 | End: 2022-03-10

## 2022-02-10 RX ADMIN — SODIUM CHLORIDE 250 ML: 9 INJECTION, SOLUTION INTRAVENOUS at 13:55

## 2022-02-10 RX ADMIN — NATALIZUMAB 300 MG: 300 INJECTION INTRAVENOUS at 13:55

## 2022-02-11 LAB — HBV CORE AB SERPL QL IA: NEGATIVE

## 2022-02-22 DIAGNOSIS — G89.29 CHRONIC PAIN OF RIGHT KNEE: ICD-10-CM

## 2022-02-22 DIAGNOSIS — G89.29 OTHER CHRONIC PAIN: ICD-10-CM

## 2022-02-22 DIAGNOSIS — M25.561 CHRONIC PAIN OF RIGHT KNEE: ICD-10-CM

## 2022-02-22 RX ORDER — HYDROCODONE BITARTRATE AND ACETAMINOPHEN 7.5; 325 MG/1; MG/1
1 TABLET ORAL 2 TIMES DAILY PRN
Qty: 60 TABLET | Refills: 0 | Status: SHIPPED | OUTPATIENT
Start: 2022-02-22 | End: 2022-03-28 | Stop reason: SDUPTHER

## 2022-02-22 NOTE — TELEPHONE ENCOUNTER
Incoming Refill Request      Medication requested (name and dose): HYDROcodone-acetaminophen (NORCO) 7.5-325 MG per tablet    Pharmacy where request should be sent: [facility]    Additional details provided by patient: NO    Best call back number: [phone]    Does the patient have less than a 3 day supply:  [x] Yes  [] No    Minerva Ramírez  02/22/22, 13:29 CST

## 2022-02-28 RX ORDER — ACETAMINOPHEN 325 MG/1
650 TABLET ORAL ONCE
Status: CANCELLED | OUTPATIENT
Start: 2022-02-28

## 2022-02-28 RX ORDER — ACETAMINOPHEN 325 MG/1
650 TABLET ORAL EVERY 6 HOURS PRN
Status: CANCELLED
Start: 2022-02-28

## 2022-02-28 RX ORDER — DIPHENHYDRAMINE HYDROCHLORIDE 50 MG/ML
25 INJECTION INTRAMUSCULAR; INTRAVENOUS ONCE
Status: CANCELLED | OUTPATIENT
Start: 2022-02-28

## 2022-02-28 RX ORDER — DIPHENHYDRAMINE HCL 25 MG
25 CAPSULE ORAL AS NEEDED
Status: CANCELLED
Start: 2022-02-28

## 2022-02-28 RX ORDER — SODIUM CHLORIDE 9 MG/ML
250 INJECTION, SOLUTION INTRAVENOUS ONCE
Status: CANCELLED | OUTPATIENT
Start: 2022-02-28

## 2022-02-28 RX ORDER — LORAZEPAM 2 MG/ML
0.5 INJECTION INTRAMUSCULAR AS NEEDED
Status: CANCELLED
Start: 2022-02-28 | End: 2022-03-07

## 2022-02-28 RX ORDER — DIPHENHYDRAMINE HYDROCHLORIDE 50 MG/ML
50 INJECTION INTRAMUSCULAR; INTRAVENOUS ONCE
Status: CANCELLED | OUTPATIENT
Start: 2022-02-28

## 2022-02-28 RX ORDER — ONDANSETRON 2 MG/ML
4 INJECTION INTRAMUSCULAR; INTRAVENOUS AS NEEDED
Status: CANCELLED
Start: 2022-02-28

## 2022-02-28 RX ORDER — ACETAMINOPHEN 325 MG/1
650 TABLET ORAL EVERY 6 HOURS PRN
Status: CANCELLED | OUTPATIENT
Start: 2022-02-28

## 2022-03-10 ENCOUNTER — APPOINTMENT (OUTPATIENT)
Dept: ONCOLOGY | Facility: HOSPITAL | Age: 47
End: 2022-03-10

## 2022-03-28 ENCOUNTER — LAB (OUTPATIENT)
Dept: LAB | Facility: HOSPITAL | Age: 47
End: 2022-03-28

## 2022-03-28 ENCOUNTER — OFFICE VISIT (OUTPATIENT)
Dept: FAMILY MEDICINE CLINIC | Facility: CLINIC | Age: 47
End: 2022-03-28

## 2022-03-28 VITALS
HEIGHT: 66 IN | DIASTOLIC BLOOD PRESSURE: 72 MMHG | TEMPERATURE: 97.5 F | OXYGEN SATURATION: 99 % | HEART RATE: 75 BPM | SYSTOLIC BLOOD PRESSURE: 124 MMHG | BODY MASS INDEX: 39.7 KG/M2 | WEIGHT: 247 LBS

## 2022-03-28 DIAGNOSIS — N89.8 VAGINAL DISCHARGE: Primary | ICD-10-CM

## 2022-03-28 DIAGNOSIS — G89.29 CHRONIC PAIN OF RIGHT KNEE: ICD-10-CM

## 2022-03-28 DIAGNOSIS — K64.9 HEMORRHOIDS, UNSPECIFIED HEMORRHOID TYPE: ICD-10-CM

## 2022-03-28 DIAGNOSIS — G89.29 OTHER CHRONIC PAIN: ICD-10-CM

## 2022-03-28 DIAGNOSIS — M25.561 CHRONIC PAIN OF RIGHT KNEE: ICD-10-CM
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
## 2022-03-28 PROBLEM — M79.651 PAIN IN RIGHT THIGH: Status: ACTIVE | Noted: 2022-03-04

## 2022-03-28 PROBLEM — J30.9 ALLERGIC RHINITIS, UNSPECIFIED: Status: ACTIVE | Noted: 2022-03-28

## 2022-03-28 PROCEDURE — 99214 OFFICE O/P EST MOD 30 MIN: CPT | Performed by: STUDENT IN AN ORGANIZED HEALTH CARE EDUCATION/TRAINING PROGRAM

## 2022-03-28 PROCEDURE — 87480 CANDIDA DNA DIR PROBE: CPT | Performed by: STUDENT IN AN ORGANIZED HEALTH CARE EDUCATION/TRAINING PROGRAM

## 2022-03-28 PROCEDURE — 87660 TRICHOMONAS VAGIN DIR PROBE: CPT | Performed by: STUDENT IN AN ORGANIZED HEALTH CARE EDUCATION/TRAINING PROGRAM

## 2022-03-28 PROCEDURE — 87510 GARDNER VAG DNA DIR PROBE: CPT | Performed by: STUDENT IN AN ORGANIZED HEALTH CARE EDUCATION/TRAINING PROGRAM

## 2022-03-28 RX ORDER — FLUCONAZOLE 150 MG/1
150 TABLET ORAL ONCE
Qty: 1 TABLET | Refills: 1 | Status: SHIPPED | OUTPATIENT
Start: 2022-03-28 | End: 2022-03-28

## 2022-03-28 RX ORDER — HYDROCODONE BITARTRATE AND ACETAMINOPHEN 7.5; 325 MG/1; MG/1
1 TABLET ORAL 2 TIMES DAILY PRN
Qty: 60 TABLET | Refills: 0 | Status: SHIPPED | OUTPATIENT
Start: 2022-03-28 | End: 2022-05-03 | Stop reason: SDUPTHER

## 2022-03-28 RX ORDER — HYDROCORTISONE 25 MG/G
CREAM TOPICAL 2 TIMES DAILY
Qty: 28 G | Refills: 1 | Status: SHIPPED | OUTPATIENT
Start: 2022-03-28

## 2022-03-29 LAB
CANDIDA ALBICANS: NEGATIVE
GARDNERELLA VAGINALIS: NEGATIVE
T VAGINALIS DNA VAG QL PROBE+SIG AMP: NEGATIVE

## 2022-04-04 ENCOUNTER — INFUSION (OUTPATIENT)
Dept: ONCOLOGY | Facility: HOSPITAL | Age: 47
End: 2022-04-04

## 2022-04-04 VITALS
SYSTOLIC BLOOD PRESSURE: 156 MMHG | TEMPERATURE: 97.6 F | RESPIRATION RATE: 18 BRPM | HEART RATE: 87 BPM | DIASTOLIC BLOOD PRESSURE: 91 MMHG

## 2022-04-04 DIAGNOSIS — G35 MULTIPLE SCLEROSIS: Primary | ICD-10-CM

## 2022-04-04 LAB
ALBUMIN SERPL-MCNC: 4 G/DL (ref 3.5–5.2)
ALBUMIN/GLOB SERPL: 1.3 G/DL
ALP SERPL-CCNC: 123 U/L (ref 39–117)
ALT SERPL W P-5'-P-CCNC: 12 U/L (ref 1–33)
ANION GAP SERPL CALCULATED.3IONS-SCNC: 9 MMOL/L (ref 5–15)
AST SERPL-CCNC: 18 U/L (ref 1–32)
BASOPHILS # BLD AUTO: 0.08 10*3/MM3 (ref 0–0.2)
BASOPHILS NFR BLD AUTO: 1.1 % (ref 0–1.5)
BILIRUB SERPL-MCNC: 0.2 MG/DL (ref 0–1.2)
BUN SERPL-MCNC: 12 MG/DL (ref 6–20)
BUN/CREAT SERPL: 9.4 (ref 7–25)
CALCIUM SPEC-SCNC: 8.6 MG/DL (ref 8.6–10.5)
CHLORIDE SERPL-SCNC: 107 MMOL/L (ref 98–107)
CO2 SERPL-SCNC: 22 MMOL/L (ref 22–29)
CREAT SERPL-MCNC: 1.28 MG/DL (ref 0.57–1)
DEPRECATED RDW RBC AUTO: 49 FL (ref 37–54)
EGFRCR SERPLBLD CKD-EPI 2021: 53.1 ML/MIN/1.73
EOSINOPHIL # BLD AUTO: 0.24 10*3/MM3 (ref 0–0.4)
EOSINOPHIL NFR BLD AUTO: 3.2 % (ref 0.3–6.2)
ERYTHROCYTE [DISTWIDTH] IN BLOOD BY AUTOMATED COUNT: 17.2 % (ref 12.3–15.4)
GLOBULIN UR ELPH-MCNC: 3.2 GM/DL
GLUCOSE SERPL-MCNC: 87 MG/DL (ref 65–99)
HCT VFR BLD AUTO: 33.9 % (ref 34–46.6)
HGB BLD-MCNC: 10.9 G/DL (ref 12–15.9)
HOLD SPECIMEN: NORMAL
IMM GRANULOCYTES # BLD AUTO: 0.02 10*3/MM3 (ref 0–0.05)
IMM GRANULOCYTES NFR BLD AUTO: 0.3 % (ref 0–0.5)
LYMPHOCYTES # BLD AUTO: 2.97 10*3/MM3 (ref 0.7–3.1)
LYMPHOCYTES NFR BLD AUTO: 39.5 % (ref 19.6–45.3)
MCH RBC QN AUTO: 25.4 PG (ref 26.6–33)
MCHC RBC AUTO-ENTMCNC: 32.2 G/DL (ref 31.5–35.7)
MCV RBC AUTO: 79 FL (ref 79–97)
MONOCYTES # BLD AUTO: 0.6 10*3/MM3 (ref 0.1–0.9)
MONOCYTES NFR BLD AUTO: 8 % (ref 5–12)
NEUTROPHILS NFR BLD AUTO: 3.61 10*3/MM3 (ref 1.7–7)
NEUTROPHILS NFR BLD AUTO: 47.9 % (ref 42.7–76)
NRBC BLD AUTO-RTO: 0.3 /100 WBC (ref 0–0.2)
PLATELET # BLD AUTO: 271 10*3/MM3 (ref 140–450)
PMV BLD AUTO: 10.4 FL (ref 6–12)
POTASSIUM SERPL-SCNC: 3.8 MMOL/L (ref 3.5–5.2)
PROT SERPL-MCNC: 7.2 G/DL (ref 6–8.5)
RBC # BLD AUTO: 4.29 10*6/MM3 (ref 3.77–5.28)
SODIUM SERPL-SCNC: 138 MMOL/L (ref 136–145)
WBC NRBC COR # BLD: 7.52 10*3/MM3 (ref 3.4–10.8)

## 2022-04-04 PROCEDURE — 85025 COMPLETE CBC W/AUTO DIFF WBC: CPT

## 2022-04-04 PROCEDURE — 80053 COMPREHEN METABOLIC PANEL: CPT

## 2022-04-04 PROCEDURE — 96375 TX/PRO/DX INJ NEW DRUG ADDON: CPT | Performed by: NURSE PRACTITIONER

## 2022-04-04 PROCEDURE — 36415 COLL VENOUS BLD VENIPUNCTURE: CPT

## 2022-04-04 PROCEDURE — 25010000002 OCRELIZUMAB 300 MG/10ML SOLUTION 300 MG VIAL: Performed by: NURSE PRACTITIONER

## 2022-04-04 PROCEDURE — A9270 NON-COVERED ITEM OR SERVICE: HCPCS | Performed by: NURSE PRACTITIONER

## 2022-04-04 PROCEDURE — 25010000002 DIPHENHYDRAMINE PER 50 MG: Performed by: NURSE PRACTITIONER

## 2022-04-04 PROCEDURE — 96365 THER/PROPH/DIAG IV INF INIT: CPT | Performed by: NURSE PRACTITIONER

## 2022-04-04 PROCEDURE — 63710000001 ACETAMINOPHEN 325 MG TABLET: Performed by: NURSE PRACTITIONER

## 2022-04-04 PROCEDURE — 25010000002 METHYLPREDNISOLONE PER 125 MG: Performed by: NURSE PRACTITIONER

## 2022-04-04 PROCEDURE — 96366 THER/PROPH/DIAG IV INF ADDON: CPT | Performed by: NURSE PRACTITIONER

## 2022-04-04 RX ORDER — ONDANSETRON 2 MG/ML
4 INJECTION INTRAMUSCULAR; INTRAVENOUS AS NEEDED
Status: DISCONTINUED | OUTPATIENT
Start: 2022-04-04 | End: 2022-04-04 | Stop reason: HOSPADM

## 2022-04-04 RX ORDER — METHYLPREDNISOLONE SODIUM SUCCINATE 125 MG/2ML
125 INJECTION, POWDER, LYOPHILIZED, FOR SOLUTION INTRAMUSCULAR; INTRAVENOUS ONCE
Status: COMPLETED | OUTPATIENT
Start: 2022-04-04 | End: 2022-04-04

## 2022-04-04 RX ORDER — METHYLPREDNISOLONE SODIUM SUCCINATE 125 MG/2ML
125 INJECTION, POWDER, LYOPHILIZED, FOR SOLUTION INTRAMUSCULAR; INTRAVENOUS ONCE
Status: CANCELLED | OUTPATIENT
Start: 2022-04-18

## 2022-04-04 RX ORDER — DIPHENHYDRAMINE HCL 25 MG
25 CAPSULE ORAL AS NEEDED
Status: CANCELLED
Start: 2022-04-18

## 2022-04-04 RX ORDER — ACETAMINOPHEN 325 MG/1
650 TABLET ORAL EVERY 6 HOURS PRN
Status: CANCELLED
Start: 2022-04-18

## 2022-04-04 RX ORDER — DIPHENHYDRAMINE HCL 25 MG
25 CAPSULE ORAL AS NEEDED
Status: DISCONTINUED | OUTPATIENT
Start: 2022-04-04 | End: 2022-04-04 | Stop reason: HOSPADM

## 2022-04-04 RX ORDER — DIPHENHYDRAMINE HYDROCHLORIDE 50 MG/ML
50 INJECTION INTRAMUSCULAR; INTRAVENOUS ONCE
Status: COMPLETED | OUTPATIENT
Start: 2022-04-04 | End: 2022-04-04

## 2022-04-04 RX ORDER — LORAZEPAM 2 MG/ML
0.5 INJECTION INTRAMUSCULAR AS NEEDED
Status: CANCELLED
Start: 2022-04-18 | End: 2022-04-25

## 2022-04-04 RX ORDER — ONDANSETRON 2 MG/ML
4 INJECTION INTRAMUSCULAR; INTRAVENOUS AS NEEDED
Status: CANCELLED
Start: 2022-04-18

## 2022-04-04 RX ORDER — SODIUM CHLORIDE 9 MG/ML
250 INJECTION, SOLUTION INTRAVENOUS ONCE
Status: CANCELLED | OUTPATIENT
Start: 2022-04-18

## 2022-04-04 RX ORDER — SODIUM CHLORIDE 9 MG/ML
250 INJECTION, SOLUTION INTRAVENOUS ONCE
Status: COMPLETED | OUTPATIENT
Start: 2022-04-04 | End: 2022-04-04

## 2022-04-04 RX ORDER — DIPHENHYDRAMINE HYDROCHLORIDE 50 MG/ML
50 INJECTION INTRAMUSCULAR; INTRAVENOUS ONCE
Status: CANCELLED | OUTPATIENT
Start: 2022-04-18

## 2022-04-04 RX ORDER — ACETAMINOPHEN 325 MG/1
650 TABLET ORAL ONCE
Status: COMPLETED | OUTPATIENT
Start: 2022-04-04 | End: 2022-04-04

## 2022-04-04 RX ORDER — LORAZEPAM 2 MG/ML
0.5 INJECTION INTRAMUSCULAR AS NEEDED
Status: DISCONTINUED | OUTPATIENT
Start: 2022-04-04 | End: 2022-04-04 | Stop reason: HOSPADM

## 2022-04-04 RX ORDER — ACETAMINOPHEN 325 MG/1
650 TABLET ORAL EVERY 6 HOURS PRN
Status: DISCONTINUED | OUTPATIENT
Start: 2022-04-04 | End: 2022-04-04 | Stop reason: HOSPADM

## 2022-04-04 RX ORDER — ACETAMINOPHEN 325 MG/1
650 TABLET ORAL ONCE
Status: CANCELLED | OUTPATIENT
Start: 2022-04-18

## 2022-04-04 RX ADMIN — OCRELIZUMAB 300 MG: 300 INJECTION INTRAVENOUS at 09:02

## 2022-04-04 RX ADMIN — SODIUM CHLORIDE 250 ML: 9 INJECTION, SOLUTION INTRAVENOUS at 08:20

## 2022-04-04 RX ADMIN — METHYLPREDNISOLONE SODIUM SUCCINATE 125 MG: 125 INJECTION, POWDER, FOR SOLUTION INTRAMUSCULAR; INTRAVENOUS at 08:25

## 2022-04-04 RX ADMIN — ACETAMINOPHEN 650 MG: 325 TABLET ORAL at 08:21

## 2022-04-04 RX ADMIN — DIPHENHYDRAMINE HYDROCHLORIDE 50 MG: 50 INJECTION, SOLUTION INTRAMUSCULAR; INTRAVENOUS at 08:21

## 2022-04-18 ENCOUNTER — APPOINTMENT (OUTPATIENT)
Dept: ONCOLOGY | Facility: HOSPITAL | Age: 47
End: 2022-04-18

## 2022-04-28 ENCOUNTER — INFUSION (OUTPATIENT)
Dept: ONCOLOGY | Facility: HOSPITAL | Age: 47
End: 2022-04-28

## 2022-04-28 VITALS
RESPIRATION RATE: 18 BRPM | SYSTOLIC BLOOD PRESSURE: 139 MMHG | DIASTOLIC BLOOD PRESSURE: 70 MMHG | HEART RATE: 79 BPM | TEMPERATURE: 97.1 F

## 2022-04-28 DIAGNOSIS — G35 MULTIPLE SCLEROSIS: Primary | ICD-10-CM

## 2022-04-28 PROCEDURE — 96366 THER/PROPH/DIAG IV INF ADDON: CPT | Performed by: INTERNAL MEDICINE

## 2022-04-28 PROCEDURE — 96365 THER/PROPH/DIAG IV INF INIT: CPT | Performed by: INTERNAL MEDICINE

## 2022-04-28 PROCEDURE — 96375 TX/PRO/DX INJ NEW DRUG ADDON: CPT | Performed by: INTERNAL MEDICINE

## 2022-04-28 PROCEDURE — 25010000002 OCRELIZUMAB 300 MG/10ML SOLUTION 300 MG VIAL: Performed by: NURSE PRACTITIONER

## 2022-04-28 PROCEDURE — 25010000002 METHYLPREDNISOLONE PER 125 MG: Performed by: NURSE PRACTITIONER

## 2022-04-28 PROCEDURE — 25010000002 DIPHENHYDRAMINE PER 50 MG: Performed by: NURSE PRACTITIONER

## 2022-04-28 RX ORDER — METHYLPREDNISOLONE SODIUM SUCCINATE 125 MG/2ML
125 INJECTION, POWDER, LYOPHILIZED, FOR SOLUTION INTRAMUSCULAR; INTRAVENOUS ONCE
Status: CANCELLED | OUTPATIENT
Start: 2022-05-02

## 2022-04-28 RX ORDER — METHYLPREDNISOLONE SODIUM SUCCINATE 125 MG/2ML
125 INJECTION, POWDER, LYOPHILIZED, FOR SOLUTION INTRAMUSCULAR; INTRAVENOUS ONCE
Status: COMPLETED | OUTPATIENT
Start: 2022-04-28 | End: 2022-04-28

## 2022-04-28 RX ORDER — ACETAMINOPHEN 325 MG/1
650 TABLET ORAL EVERY 6 HOURS PRN
Status: CANCELLED
Start: 2022-05-02

## 2022-04-28 RX ORDER — DIPHENHYDRAMINE HYDROCHLORIDE 50 MG/ML
50 INJECTION INTRAMUSCULAR; INTRAVENOUS ONCE
Status: CANCELLED | OUTPATIENT
Start: 2022-05-02

## 2022-04-28 RX ORDER — ACETAMINOPHEN 325 MG/1
650 TABLET ORAL ONCE
Status: CANCELLED | OUTPATIENT
Start: 2022-05-02

## 2022-04-28 RX ORDER — LORAZEPAM 2 MG/ML
0.5 INJECTION INTRAMUSCULAR AS NEEDED
Status: CANCELLED
Start: 2022-05-02 | End: 2022-05-09

## 2022-04-28 RX ORDER — DIPHENHYDRAMINE HCL 25 MG
25 CAPSULE ORAL AS NEEDED
Status: CANCELLED
Start: 2022-05-02

## 2022-04-28 RX ORDER — ACETAMINOPHEN 325 MG/1
650 TABLET ORAL ONCE
Status: COMPLETED | OUTPATIENT
Start: 2022-04-28 | End: 2022-04-28

## 2022-04-28 RX ORDER — SODIUM CHLORIDE 9 MG/ML
250 INJECTION, SOLUTION INTRAVENOUS ONCE
Status: COMPLETED | OUTPATIENT
Start: 2022-04-28 | End: 2022-04-28

## 2022-04-28 RX ORDER — SODIUM CHLORIDE 9 MG/ML
250 INJECTION, SOLUTION INTRAVENOUS ONCE
Status: CANCELLED | OUTPATIENT
Start: 2022-05-02

## 2022-04-28 RX ORDER — ONDANSETRON 2 MG/ML
4 INJECTION INTRAMUSCULAR; INTRAVENOUS AS NEEDED
Status: CANCELLED
Start: 2022-05-02

## 2022-04-28 RX ORDER — DIPHENHYDRAMINE HYDROCHLORIDE 50 MG/ML
50 INJECTION INTRAMUSCULAR; INTRAVENOUS ONCE
Status: COMPLETED | OUTPATIENT
Start: 2022-04-28 | End: 2022-04-28

## 2022-04-28 RX ADMIN — OCRELIZUMAB 300 MG: 300 INJECTION INTRAVENOUS at 08:41

## 2022-04-28 RX ADMIN — ACETAMINOPHEN 650 MG: 325 TABLET ORAL at 07:59

## 2022-04-28 RX ADMIN — METHYLPREDNISOLONE SODIUM SUCCINATE 125 MG: 125 INJECTION, POWDER, FOR SOLUTION INTRAMUSCULAR; INTRAVENOUS at 08:21

## 2022-04-28 RX ADMIN — DIPHENHYDRAMINE HYDROCHLORIDE 50 MG: 50 INJECTION INTRAMUSCULAR; INTRAVENOUS at 07:59

## 2022-04-28 RX ADMIN — SODIUM CHLORIDE 250 ML: 9 INJECTION, SOLUTION INTRAVENOUS at 07:58

## 2022-05-03 DIAGNOSIS — M25.561 CHRONIC PAIN OF RIGHT KNEE: ICD-10-CM

## 2022-05-03 DIAGNOSIS — G89.29 OTHER CHRONIC PAIN: ICD-10-CM

## 2022-05-03 DIAGNOSIS — G89.29 CHRONIC PAIN OF RIGHT KNEE: ICD-10-CM

## 2022-05-03 RX ORDER — HYDROCODONE BITARTRATE AND ACETAMINOPHEN 7.5; 325 MG/1; MG/1
1 TABLET ORAL 2 TIMES DAILY PRN
Qty: 60 TABLET | Refills: 0 | Status: SHIPPED | OUTPATIENT
Start: 2022-05-03 | End: 2022-06-01 | Stop reason: SDUPTHER

## 2022-05-03 NOTE — TELEPHONE ENCOUNTER
Rx Refill Note  Requested Prescriptions     Pending Prescriptions Disp Refills   • HYDROcodone-acetaminophen (NORCO) 7.5-325 MG per tablet 60 tablet 0     Sig: Take 1 tablet by mouth 2 (Two) Times a Day As Needed for Moderate Pain .      Last office visit with prescribing clinician: 3/28/2022      Next office visit with prescribing clinician: 6/28/2022            Beto Riley Rep  05/03/22, 11:16 CDT

## 2022-06-01 DIAGNOSIS — G89.29 CHRONIC PAIN OF RIGHT KNEE: ICD-10-CM

## 2022-06-01 DIAGNOSIS — M25.561 CHRONIC PAIN OF RIGHT KNEE: ICD-10-CM

## 2022-06-01 DIAGNOSIS — G89.29 OTHER CHRONIC PAIN: ICD-10-CM

## 2022-06-01 NOTE — TELEPHONE ENCOUNTER
Incoming Refill Request      Medication requested (name and dose):  HYDROcodone-acetaminophen (NORCO) 7.5-325 MG per tablet    Pharmacy where request should be sent: [facility]    Additional details provided by patient: HAS APPOINTMENT 06/28/22    Best call back number:  [phone]    Does the patient have less than a 3 day supply:  [x] Yes  [] No    Minerva Ramírez  06/01/22, 08:53 CDT

## 2022-06-02 RX ORDER — HYDROCODONE BITARTRATE AND ACETAMINOPHEN 7.5; 325 MG/1; MG/1
1 TABLET ORAL 2 TIMES DAILY PRN
Qty: 60 TABLET | Refills: 0 | Status: SHIPPED | OUTPATIENT
Start: 2022-06-02 | End: 2022-06-28 | Stop reason: SDUPTHER

## 2022-06-02 NOTE — TELEPHONE ENCOUNTER
Rx Refill Note  Requested Prescriptions     Pending Prescriptions Disp Refills   • HYDROcodone-acetaminophen (NORCO) 7.5-325 MG per tablet 60 tablet 0     Sig: Take 1 tablet by mouth 2 (Two) Times a Day As Needed for Moderate Pain .      Last office visit with prescribing clinician: 3/28/2022      Next office visit with prescribing clinician: 6/28/2022            Beto Riley Rep  06/02/22, 13:08 CDT

## 2022-06-15 NOTE — TELEPHONE ENCOUNTER
05/03/2018 09:40 AM Phone (Outgoing) pa     called [employer] to schedule delivery tysabri will be delivered 5/4/18       By Denisha Rollins

## 2022-06-28 ENCOUNTER — OFFICE VISIT (OUTPATIENT)
Dept: FAMILY MEDICINE CLINIC | Facility: CLINIC | Age: 47
End: 2022-06-28

## 2022-06-28 VITALS
SYSTOLIC BLOOD PRESSURE: 128 MMHG | HEIGHT: 66 IN | WEIGHT: 253 LBS | BODY MASS INDEX: 40.66 KG/M2 | TEMPERATURE: 96.6 F | HEART RATE: 86 BPM | OXYGEN SATURATION: 99 % | DIASTOLIC BLOOD PRESSURE: 88 MMHG

## 2022-06-28 DIAGNOSIS — Z11.59 NEED FOR HEPATITIS C SCREENING TEST: ICD-10-CM

## 2022-06-28 DIAGNOSIS — G89.29 OTHER CHRONIC PAIN: ICD-10-CM

## 2022-06-28 DIAGNOSIS — M25.561 CHRONIC PAIN OF RIGHT KNEE: ICD-10-CM

## 2022-06-28 DIAGNOSIS — G89.29 CHRONIC PAIN OF RIGHT KNEE: ICD-10-CM

## 2022-06-28 DIAGNOSIS — Z00.00 MEDICARE ANNUAL WELLNESS VISIT, INITIAL: ICD-10-CM

## 2022-06-28 DIAGNOSIS — Z12.11 SCREENING FOR COLON CANCER: Primary | ICD-10-CM

## 2022-06-28 PROBLEM — E66.01 MORBID OBESITY DUE TO EXCESS CALORIES: Status: ACTIVE | Noted: 2022-06-16

## 2022-06-28 PROBLEM — D64.9 ANEMIA: Status: ACTIVE | Noted: 2022-06-16

## 2022-06-28 PROBLEM — R73.03 PREDIABETES: Status: ACTIVE | Noted: 2022-06-16

## 2022-06-28 PROCEDURE — G0438 PPPS, INITIAL VISIT: HCPCS | Performed by: STUDENT IN AN ORGANIZED HEALTH CARE EDUCATION/TRAINING PROGRAM

## 2022-06-28 PROCEDURE — 1159F MED LIST DOCD IN RCRD: CPT | Performed by: STUDENT IN AN ORGANIZED HEALTH CARE EDUCATION/TRAINING PROGRAM

## 2022-06-28 PROCEDURE — 1170F FXNL STATUS ASSESSED: CPT | Performed by: STUDENT IN AN ORGANIZED HEALTH CARE EDUCATION/TRAINING PROGRAM

## 2022-06-28 PROCEDURE — 96160 PT-FOCUSED HLTH RISK ASSMT: CPT | Performed by: STUDENT IN AN ORGANIZED HEALTH CARE EDUCATION/TRAINING PROGRAM

## 2022-06-28 RX ORDER — HYDROCODONE BITARTRATE AND ACETAMINOPHEN 7.5; 325 MG/1; MG/1
1 TABLET ORAL 2 TIMES DAILY PRN
Qty: 60 TABLET | Refills: 0 | Status: SHIPPED | OUTPATIENT
Start: 2022-06-28 | End: 2022-08-03 | Stop reason: SDUPTHER

## 2022-06-28 RX ORDER — METFORMIN HYDROCHLORIDE 500 MG/1
1 TABLET, EXTENDED RELEASE ORAL DAILY
COMMUNITY
Start: 2022-06-16

## 2022-06-28 RX ORDER — BUPROPION HYDROCHLORIDE 150 MG/1
150 TABLET ORAL DAILY
COMMUNITY
Start: 2022-05-10
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
## 2022-06-28 NOTE — PATIENT INSTRUCTIONS
Medicare Wellness  Personal Prevention Plan of Service     Date of Office Visit:    Encounter Provider:  Nehemiah Chow MD  Place of Service:  [facility]  Patient Name: Jenn Good  :  1975    As part of the Medicare Wellness portion of your visit today, we are providing you with this personalized preventive plan of services (PPPS). This plan is based upon recommendations of the [place] Preventive Services Task Force (USPSTF) and the Advisory Committee on Immunization Practices (ACIP).    This lists the preventive care services that should be considered, and provides dates of when you are due. Items listed as completed are up-to-date and do not require any further intervention.    Health Maintenance   Topic Date Due    COLORECTAL CANCER SCREENING  Never done    HEPATITIS C SCREENING  Never done    COVID-19 Vaccine (3 - Booster for Moderna series) 2022 (Originally 12/15/2021)    INFLUENZA VACCINE  10/01/2022    LIPID PANEL  2022    ANNUAL WELLNESS VISIT  2023    PAP SMEAR  2023    TDAP/TD VACCINES (3 - Td or Tdap) 2027    Pneumococcal Vaccine 0-64  Aged Out       No orders of the defined types were placed in this encounter.      No follow-ups on file.

## 2022-07-28 ENCOUNTER — OFFICE VISIT (OUTPATIENT)
Dept: FAMILY MEDICINE CLINIC | Facility: CLINIC | Age: 47
End: 2022-07-28

## 2022-07-28 ENCOUNTER — LAB (OUTPATIENT)
Dept: LAB | Facility: HOSPITAL | Age: 47
End: 2022-07-28

## 2022-07-28 VITALS
SYSTOLIC BLOOD PRESSURE: 134 MMHG | DIASTOLIC BLOOD PRESSURE: 90 MMHG | OXYGEN SATURATION: 99 % | HEIGHT: 66 IN | HEART RATE: 77 BPM | BODY MASS INDEX: 40.18 KG/M2 | WEIGHT: 250 LBS | TEMPERATURE: 97.1 F

## 2022-07-28 DIAGNOSIS — Z11.59 NEED FOR HEPATITIS C SCREENING TEST: ICD-10-CM

## 2022-07-28 DIAGNOSIS — G44.209 TENSION-TYPE HEADACHE, NOT INTRACTABLE, UNSPECIFIED CHRONICITY PATTERN: ICD-10-CM

## 2022-07-28 DIAGNOSIS — I10 PRIMARY HYPERTENSION: ICD-10-CM

## 2022-07-28 DIAGNOSIS — G47.00 INSOMNIA, UNSPECIFIED TYPE: Primary | ICD-10-CM

## 2022-07-28 PROCEDURE — 99214 OFFICE O/P EST MOD 30 MIN: CPT | Performed by: STUDENT IN AN ORGANIZED HEALTH CARE EDUCATION/TRAINING PROGRAM

## 2022-07-28 PROCEDURE — 86803 HEPATITIS C AB TEST: CPT

## 2022-07-29 LAB — HCV AB SER DONR QL: NORMAL

## 2022-08-03 DIAGNOSIS — M25.561 CHRONIC PAIN OF RIGHT KNEE: ICD-10-CM

## 2022-08-03 DIAGNOSIS — G89.29 OTHER CHRONIC PAIN: ICD-10-CM

## 2022-08-03 DIAGNOSIS — G89.29 CHRONIC PAIN OF RIGHT KNEE: ICD-10-CM

## 2022-08-03 RX ORDER — HYDROCODONE BITARTRATE AND ACETAMINOPHEN 7.5; 325 MG/1; MG/1
1 TABLET ORAL 2 TIMES DAILY PRN
Qty: 60 TABLET | Refills: 0 | Status: SHIPPED | OUTPATIENT
Start: 2022-08-03 | End: 2022-09-06 | Stop reason: SDUPTHER

## 2022-08-03 NOTE — TELEPHONE ENCOUNTER
Rx Refill Note  Requested Prescriptions     Pending Prescriptions Disp Refills   • HYDROcodone-acetaminophen (NORCO) 7.5-325 MG per tablet 60 tablet 0     Sig: Take 1 tablet by mouth 2 (Two) Times a Day As Needed for Moderate Pain .      Last office visit with prescribing clinician: 7/28/2022      Next office visit with prescribing clinician: 9/13/2022            Sia Villagomez MA  08/03/22, 11:58 CDT

## 2022-09-06 DIAGNOSIS — M25.561 CHRONIC PAIN OF RIGHT KNEE: ICD-10-CM

## 2022-09-06 DIAGNOSIS — G89.29 CHRONIC PAIN OF RIGHT KNEE: ICD-10-CM

## 2022-09-06 DIAGNOSIS — G89.29 OTHER CHRONIC PAIN: ICD-10-CM

## 2022-09-06 NOTE — TELEPHONE ENCOUNTER
Rx Refill Note  Requested Prescriptions     Pending Prescriptions Disp Refills   • HYDROcodone-acetaminophen (NORCO) 7.5-325 MG per tablet 60 tablet 0     Sig: Take 1 tablet by mouth 2 (Two) Times a Day As Needed for Moderate Pain.      Last office visit with prescribing clinician: 7/28/2022      Next office visit with prescribing clinician: 9/13/2022            Sia Villagomez MA  09/06/22, 10:13 CDT

## 2022-09-07 DIAGNOSIS — G89.29 OTHER CHRONIC PAIN: ICD-10-CM

## 2022-09-07 DIAGNOSIS — M25.561 CHRONIC PAIN OF RIGHT KNEE: ICD-10-CM

## 2022-09-07 DIAGNOSIS — G89.29 CHRONIC PAIN OF RIGHT KNEE: ICD-10-CM

## 2022-09-07 RX ORDER — HYDROCODONE BITARTRATE AND ACETAMINOPHEN 7.5; 325 MG/1; MG/1
1 TABLET ORAL 2 TIMES DAILY PRN
Qty: 60 TABLET | Refills: 0 | Status: CANCELLED | OUTPATIENT
Start: 2022-09-07

## 2022-09-08 RX ORDER — HYDROCODONE BITARTRATE AND ACETAMINOPHEN 7.5; 325 MG/1; MG/1
1 TABLET ORAL 2 TIMES DAILY PRN
Qty: 60 TABLET | Refills: 0 | Status: SHIPPED | OUTPATIENT
Start: 2022-09-08 | End: 2022-10-06 | Stop reason: SDUPTHER

## 2022-09-20 ENCOUNTER — OFFICE VISIT (OUTPATIENT)
Dept: FAMILY MEDICINE CLINIC | Facility: CLINIC | Age: 47
End: 2022-09-20

## 2022-09-20 ENCOUNTER — LAB (OUTPATIENT)
Dept: LAB | Facility: HOSPITAL | Age: 47
End: 2022-09-20

## 2022-09-20 VITALS
WEIGHT: 240 LBS | HEIGHT: 66 IN | TEMPERATURE: 96.8 F | HEART RATE: 91 BPM | OXYGEN SATURATION: 97 % | SYSTOLIC BLOOD PRESSURE: 120 MMHG | BODY MASS INDEX: 38.57 KG/M2 | DIASTOLIC BLOOD PRESSURE: 82 MMHG

## 2022-09-20 DIAGNOSIS — N89.8 VAGINAL DISCHARGE: ICD-10-CM

## 2022-09-20 DIAGNOSIS — M25.561 CHRONIC PAIN OF RIGHT KNEE: Primary | ICD-10-CM

## 2022-09-20 DIAGNOSIS — M25.361 INSTABILITY OF RIGHT KNEE JOINT: ICD-10-CM

## 2022-09-20 DIAGNOSIS — G89.29 CHRONIC PAIN OF RIGHT KNEE: Primary | ICD-10-CM

## 2022-09-20 DIAGNOSIS — I10 PRIMARY HYPERTENSION: ICD-10-CM

## 2022-09-20 PROCEDURE — 87510 GARDNER VAG DNA DIR PROBE: CPT | Performed by: STUDENT IN AN ORGANIZED HEALTH CARE EDUCATION/TRAINING PROGRAM

## 2022-09-20 PROCEDURE — 87660 TRICHOMONAS VAGIN DIR PROBE: CPT | Performed by: STUDENT IN AN ORGANIZED HEALTH CARE EDUCATION/TRAINING PROGRAM

## 2022-09-20 PROCEDURE — 87480 CANDIDA DNA DIR PROBE: CPT | Performed by: STUDENT IN AN ORGANIZED HEALTH CARE EDUCATION/TRAINING PROGRAM

## 2022-09-20 PROCEDURE — 99214 OFFICE O/P EST MOD 30 MIN: CPT | Performed by: STUDENT IN AN ORGANIZED HEALTH CARE EDUCATION/TRAINING PROGRAM

## 2022-09-20 RX ORDER — DEXTROAMPHETAMINE SACCHARATE, AMPHETAMINE ASPARTATE, DEXTROAMPHETAMINE SULFATE AND AMPHETAMINE SULFATE 2.5; 2.5; 2.5; 2.5 MG/1; MG/1; MG/1; MG/1
20 TABLET ORAL 2 TIMES DAILY PRN
COMMUNITY

## 2022-09-20 RX ORDER — ESOMEPRAZOLE MAGNESIUM 40 MG/1
1 CAPSULE, DELAYED RELEASE ORAL DAILY PRN
COMMUNITY
End: 2022-12-29

## 2022-09-20 RX ORDER — DICYCLOMINE HYDROCHLORIDE 10 MG/1
1 CAPSULE ORAL EVERY 6 HOURS
COMMUNITY

## 2022-09-20 RX ORDER — FLUCONAZOLE 150 MG/1
150 TABLET ORAL ONCE
Qty: 1 TABLET | Refills: 0 | Status: SHIPPED | OUTPATIENT
Start: 2022-09-20 | End: 2022-09-20

## 2022-09-20 NOTE — PROGRESS NOTES
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 allergies, current medications, past family history, past medical history, past social history, past surgical history and problem list.    PHQ-9 Total Score:           Physical Exam  Constitutional:       Appearance: Normal appearance.   HENT:      Head: Normocephalic and atraumatic.      Right Ear: External ear normal.      Left Ear: External ear normal.   Eyes:      General:         Right eye: No discharge.         Left eye: No discharge.      Conjunctiva/sclera: Conjunctivae normal.   Cardiovascular:      Rate and Rhythm: Normal rate and regular rhythm.      Pulses: Normal pulses.      Heart sounds: Normal heart sounds. No murmur heard.  Pulmonary:      Effort: Pulmonary effort is normal. No respiratory distress.      Breath sounds: Normal breath sounds.   Abdominal:      General: There is no distension.      Palpations: Abdomen is soft.      Tenderness: There is no abdominal tenderness.   Musculoskeletal:      Cervical back: Normal range of motion.      Right knee: Tenderness present over the medial joint line.      Instability Tests: Anterior drawer test negative. Posterior drawer test negative. Anterior Lachman test negative.      Left knee:      Instability Tests: Anterior drawer test negative. Posterior  drawer test negative. Anterior Lachman test negative.      Right lower leg: No edema.      Left lower leg: No edema.   Lymphadenopathy:      Cervical: No cervical adenopathy.   Neurological:      Mental Status: She is alert. Mental status is at baseline.   Psychiatric:         Mood and Affect: Mood normal.         Behavior: Behavior normal.       Assessment & Plan    Diagnosis Plan   1. Chronic pain of right knee  XR Knee 3 View Right    XR Knee Bilateral AP Standing    MRI Knee Right Without Contrast    Ambulatory Referral to Physical Therapy   2. Instability of right knee joint  MRI Knee Right Without Contrast   3. Allergy, subsequent encounter     4. Vaginal discharge  fluconazole (Diflucan) 150 MG tablet    Gardnerella vaginalis, Trichomonas vaginalis, Candida albicans, DNA - Swab, Vagina      Orders Placed This Encounter   Procedures   • Gardnerella vaginalis, Trichomonas vaginalis, Candida albicans, DNA - Swab, Vagina     Order Specific Question:   Release to patient     Answer:   Routine Release   • XR Knee 3 View Right     Order Specific Question:   Reason for Exam:     Answer:   right knee pain     Order Specific Question:   Patient Pregnant     Answer:   No     Order Specific Question:   Release to patient     Answer:   Routine Release   • XR Knee Bilateral AP Standing     Order Specific Question:   Reason for Exam:     Answer:   knee pain     Order Specific Question:   Patient Pregnant     Answer:   No   • MRI Knee Right Without Contrast     Standing Status:   Future     Standing Expiration Date:   9/20/2023     Order Specific Question:   Patient Pregnant     Answer:   No   • Ambulatory Referral to Physical Therapy     Referral Priority:   Routine     Referral Type:   Physical Therapy     Referral Reason:   Specialty Services Required     Requested Specialty:   Physical Therapy     Number of Visits Requested:   1     Right knee pain; patient reports having history approximately 20 years ago, does report  some instability which is concerning.  Exam significant for marked tenderness over medial joint line, likely due to pathology with meniscus versus severe OA.  Will obtain x-ray as above, MRI as above, patient to make follow-up appointment with orthopedic surgery.  Continue to encourage weight loss.    Vaginal discharge; patient declined exam today, will treat empirically for yeast infection as above, swab as above.  Patient voiced understanding, agreeable plan.    Hypertension; well-controlled current medications, no adverse effects of medications, continue current management.          This document has been electronically signed by Nehemiah Chow MD on September 20, 2022 10:10 CDT    EMR Dragon/Transciption Disclaimer: Some of this note may be an electronic transcription/translation of spoken language to printed text.  The electronic translation of spoken language may permit erroneous, or at times, nonsensical words or phrases to be inadvertently transcribed. Although I have reviewed the note for such errors, some may still exist.

## 2022-09-22 ENCOUNTER — HOSPITAL ENCOUNTER (OUTPATIENT)
Dept: MRI IMAGING | Facility: HOSPITAL | Age: 47
Discharge: HOME | End: 2022-09-22
Admitting: STUDENT IN AN ORGANIZED HEALTH CARE EDUCATION/TRAINING PROGRAM

## 2022-09-22 DIAGNOSIS — M25.361 INSTABILITY OF RIGHT KNEE JOINT: ICD-10-CM

## 2022-09-22 DIAGNOSIS — M25.561 CHRONIC PAIN OF RIGHT KNEE: ICD-10-CM

## 2022-09-22 DIAGNOSIS — G89.29 CHRONIC PAIN OF RIGHT KNEE: ICD-10-CM

## 2022-09-22 PROCEDURE — 73721 MRI JNT OF LWR EXTRE W/O DYE: CPT

## 2022-10-03 ENCOUNTER — APPOINTMENT (OUTPATIENT)
Dept: ONCOLOGY | Facility: HOSPITAL | Age: 47
End: 2022-10-03

## 2022-10-06 DIAGNOSIS — G89.29 CHRONIC PAIN OF RIGHT KNEE: ICD-10-CM

## 2022-10-06 DIAGNOSIS — M25.561 CHRONIC PAIN OF RIGHT KNEE: ICD-10-CM

## 2022-10-06 DIAGNOSIS — G89.29 OTHER CHRONIC PAIN: ICD-10-CM

## 2022-10-06 RX ORDER — HYDROCODONE BITARTRATE AND ACETAMINOPHEN 7.5; 325 MG/1; MG/1
1 TABLET ORAL 2 TIMES DAILY PRN
Qty: 60 TABLET | Refills: 0 | Status: SHIPPED | OUTPATIENT
Start: 2022-10-06 | End: 2022-11-08 | Stop reason: SDUPTHER

## 2022-10-06 NOTE — TELEPHONE ENCOUNTER
Rx Refill Note  Requested Prescriptions     Pending Prescriptions Disp Refills   • HYDROcodone-acetaminophen (NORCO) 7.5-325 MG per tablet 60 tablet 0     Sig: Take 1 tablet by mouth 2 (Two) Times a Day As Needed for Moderate Pain.      Last office visit with prescribing clinician: 9/20/2022      Next office visit with prescribing clinician: 12/29/2022            Sia Villagomez MA  10/06/22, 11:55 CDT

## 2022-10-18 DIAGNOSIS — M23.91 INTERNAL DERANGEMENT OF RIGHT KNEE: Primary | ICD-10-CM

## 2022-10-19 ENCOUNTER — HOSPITAL ENCOUNTER (OUTPATIENT)
Dept: PHYSICAL THERAPY | Facility: HOSPITAL | Age: 47
Setting detail: THERAPIES SERIES
Discharge: HOME | End: 2022-10-19

## 2022-10-19 DIAGNOSIS — M23.91 INTERNAL DERANGEMENT OF RIGHT KNEE: Primary | ICD-10-CM

## 2022-10-19 DIAGNOSIS — M25.561 CHRONIC PAIN OF RIGHT KNEE: ICD-10-CM

## 2022-10-19 DIAGNOSIS — G89.29 CHRONIC PAIN OF RIGHT KNEE: ICD-10-CM

## 2022-10-19 PROCEDURE — 97162 PT EVAL MOD COMPLEX 30 MIN: CPT | Performed by: PHYSICAL THERAPIST
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
## 2022-10-20 ENCOUNTER — HOSPITAL ENCOUNTER (OUTPATIENT)
Dept: PHYSICAL THERAPY | Facility: HOSPITAL | Age: 47
Setting detail: THERAPIES SERIES
Discharge: HOME | End: 2022-10-20

## 2022-10-20 DIAGNOSIS — G89.29 CHRONIC PAIN OF RIGHT KNEE: ICD-10-CM

## 2022-10-20 DIAGNOSIS — M25.561 CHRONIC PAIN OF RIGHT KNEE: ICD-10-CM

## 2022-10-20 DIAGNOSIS — M23.91 INTERNAL DERANGEMENT OF RIGHT KNEE: Primary | ICD-10-CM

## 2022-10-20 PROCEDURE — 97110 THERAPEUTIC EXERCISES: CPT
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
## 2022-10-25 ENCOUNTER — TELEPHONE (OUTPATIENT)
Dept: PHYSICAL THERAPY | Facility: HOSPITAL | Age: 47
End: 2022-10-25

## 2022-10-25 ENCOUNTER — SPECIALTY PHARMACY (OUTPATIENT)
Dept: ONCOLOGY | Facility: CLINIC | Age: 47
End: 2022-10-25

## 2022-10-25 DIAGNOSIS — M23.91 INTERNAL DERANGEMENT OF RIGHT KNEE: ICD-10-CM

## 2022-10-25 DIAGNOSIS — G89.29 CHRONIC PAIN OF RIGHT KNEE: Primary | ICD-10-CM

## 2022-10-25 DIAGNOSIS — G35 MS (MULTIPLE SCLEROSIS): ICD-10-CM

## 2022-10-25 DIAGNOSIS — M25.561 CHRONIC PAIN OF RIGHT KNEE: Primary | ICD-10-CM

## 2022-10-25 NOTE — TELEPHONE ENCOUNTER
Called for no show appointment. left message on voicemail with details of next appoint and to call if unable to make appointment.

## 2022-10-25 NOTE — PROGRESS NOTES
Specialty Pharmacy Refill Coordination Note     Jenn is a 47 y.o. female   She is currently enrolled with [employer] patient [employer]  ([phone]) in which she gets free drug. The pharmacy that fills her ocrevus infusion drug is [facility] ([phone]) I notified them today for shipment of the ocrevus and it will arrive here at the hospital on 11/03/22       Keyla Chow, Pharmacy Technician  Specialty Pharmacy Technician

## 2022-10-27 ENCOUNTER — HOSPITAL ENCOUNTER (OUTPATIENT)
Dept: PHYSICAL THERAPY | Facility: HOSPITAL | Age: 47
Setting detail: THERAPIES SERIES
Discharge: HOME | End: 2022-10-27

## 2022-10-27 DIAGNOSIS — M25.561 CHRONIC PAIN OF RIGHT KNEE: Primary | ICD-10-CM

## 2022-10-27 DIAGNOSIS — M23.91 INTERNAL DERANGEMENT OF RIGHT KNEE: ICD-10-CM

## 2022-10-27 DIAGNOSIS — G35 MS (MULTIPLE SCLEROSIS): ICD-10-CM

## 2022-10-27 DIAGNOSIS — R26.89 BALANCE PROBLEM: ICD-10-CM

## 2022-10-27 DIAGNOSIS — Z91.81 AT RISK FOR FALLS: ICD-10-CM

## 2022-10-27 DIAGNOSIS — G89.29 CHRONIC PAIN OF RIGHT KNEE: Primary | ICD-10-CM

## 2022-10-27 PROCEDURE — 97110 THERAPEUTIC EXERCISES: CPT
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
## 2022-10-31 ENCOUNTER — APPOINTMENT (OUTPATIENT)
Dept: ONCOLOGY | Facility: HOSPITAL | Age: 47
End: 2022-10-31

## 2022-11-01 ENCOUNTER — HOSPITAL ENCOUNTER (OUTPATIENT)
Dept: PHYSICAL THERAPY | Facility: HOSPITAL | Age: 47
Setting detail: THERAPIES SERIES
Discharge: HOME | End: 2022-11-01

## 2022-11-01 DIAGNOSIS — G35 MS (MULTIPLE SCLEROSIS): ICD-10-CM

## 2022-11-01 DIAGNOSIS — M25.561 CHRONIC PAIN OF RIGHT KNEE: Primary | ICD-10-CM

## 2022-11-01 DIAGNOSIS — G89.29 CHRONIC PAIN OF RIGHT KNEE: Primary | ICD-10-CM

## 2022-11-01 DIAGNOSIS — M23.91 INTERNAL DERANGEMENT OF RIGHT KNEE: ICD-10-CM

## 2022-11-01 PROCEDURE — 97110 THERAPEUTIC EXERCISES: CPT | Performed by: PHYSICAL THERAPIST
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
## 2022-11-03 ENCOUNTER — HOSPITAL ENCOUNTER (OUTPATIENT)
Dept: PHYSICAL THERAPY | Facility: HOSPITAL | Age: 47
Setting detail: THERAPIES SERIES
Discharge: HOME | End: 2022-11-03

## 2022-11-03 DIAGNOSIS — M25.561 CHRONIC PAIN OF RIGHT KNEE: Primary | ICD-10-CM

## 2022-11-03 DIAGNOSIS — G89.29 CHRONIC PAIN OF RIGHT KNEE: Primary | ICD-10-CM

## 2022-11-03 DIAGNOSIS — M23.91 INTERNAL DERANGEMENT OF RIGHT KNEE: ICD-10-CM

## 2022-11-03 PROCEDURE — 97110 THERAPEUTIC EXERCISES: CPT
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
## 2022-11-07 ENCOUNTER — OFFICE VISIT (OUTPATIENT)
Dept: ORTHOPEDIC SURGERY | Facility: CLINIC | Age: 47
End: 2022-11-07

## 2022-11-07 VITALS — BODY MASS INDEX: 38.57 KG/M2 | WEIGHT: 240 LBS | HEIGHT: 66 IN

## 2022-11-07 DIAGNOSIS — M23.91 INTERNAL DERANGEMENT OF RIGHT KNEE: ICD-10-CM

## 2022-11-07 DIAGNOSIS — G89.29 CHRONIC PAIN OF RIGHT KNEE: Primary | ICD-10-CM

## 2022-11-07 DIAGNOSIS — M25.561 CHRONIC PAIN OF RIGHT KNEE: Primary | ICD-10-CM

## 2022-11-07 PROCEDURE — 99214 OFFICE O/P EST MOD 30 MIN: CPT | Performed by: NURSE PRACTITIONER

## 2022-11-07 NOTE — PROGRESS NOTES
"Jenn Good is a 47 y.o. female returns for     Chief Complaint   Patient presents with   • Right Knee - Follow-up       HISTORY OF PRESENT ILLNESS:      Mrs. Good is a 47-year-old female who presents today to follow-up on right knee pain.  Patient has had right knee pain for at least the past year.  She has tried Norco, baclofen, diclofenac, and physical therapy.  She has had a recent MRI, she states no one is gone over these results with her yet.  She reports physical therapy has actually resulted in significant improvement in symptoms.  However she does report some locking, popping, and occasional catching.  She does have pain with squatting and pivoting.  Pain is mostly in her lateral knee.        CONCURRENT MEDICAL HISTORY:    The following portions of the patient's history were reviewed and updated as appropriate: allergies, current medications, past family history, past medical history, past social history, past surgical history and problem list.     ROS  No fevers or chills.  No chest pain or shortness of air.  No GI or  disturbances.  Right knee pain.    PHYSICAL EXAMINATION:       Ht 167.6 cm (66\")   Wt 109 kg (240 lb)   LMP  (LMP Unknown)   BMI 38.74 kg/m²     Physical Exam  Vitals and nursing note reviewed.   Constitutional:       General: She is not in acute distress.     Appearance: She is well-developed. She is not toxic-appearing or diaphoretic.   HENT:      Head: Normocephalic.   Eyes:      General: No scleral icterus.  Pulmonary:      Effort: Pulmonary effort is normal. No respiratory distress.   Abdominal:      Palpations: Abdomen is soft.   Musculoskeletal:      Right knee: No effusion.      Instability Tests: Medial Nevaeh test positive and lateral Nevaeh test positive.   Skin:     General: Skin is warm and dry.   Neurological:      Mental Status: She is alert and oriented to person, place, and time.   Psychiatric:         Mood and Affect: Mood normal.         Behavior: Behavior " normal.         Thought Content: Thought content normal.         Judgment: Judgment normal.         GAIT:     []  Normal  [x]  Antalgic    Assistive device: []  None  []  Walker     []  Crutches  [x]  Cane     []  Wheelchair  []  Stretcher    Right Knee Exam     Tenderness   The patient is experiencing tenderness in the lateral joint line and medial joint line.    Range of Motion   Extension: 0   Flexion: 120     Tests   Nevaeh:  Medial - positive Lateral - positive  Varus: negative Valgus: negative  Drawer:  Anterior - negative        Other   Erythema: absent  Sensation: normal  Pulse: present  Swelling: mild  Effusion: no effusion present    Comments:  No evidence of infection.  No significant pain with arc of motion today              No results found.          ASSESSMENT:    Diagnoses and all orders for this visit:    Chronic pain of right knee    Internal derangement of right knee          PLAN    MRI results reviewed with patient, patient has a horizontal lateral meniscus tear.  Patient does have some complaints with mechanical symptoms and exam today is consistent with meniscus pathology.  Patient's most recent GFR was 53, therefore we will avoid oral NSAIDs if we can.  After discussing available treatment options including intra-articular injections and surgical referral, patient declines these options at this time.  Patient reports that she is improving with PT and desires to continue physical therapy at this time.  Signs and symptoms to report and when to seek care explained to patient.  Patient verbalized understanding    Return if symptoms worsen or fail to improve.    EMR Dragon/Transciption Disclaimer: Some of this note may be an electronic transcription/translation of spoken language to printed text.  The electronic translation of spoken language may permit erroneous, or at times, nonsensical words or phrases to be inadvertently transcribed. Although I have reviewed the note for such errors, some  may still exist.       Time spent of a minimum of 30 minutes including the face to face evaluation, reviewing of medical history and prior medial records, reviewing of diagnostic studies, ordering additional tests, documentation, patient education and coordination of care.         This document has been electronically signed by Gayatri GOMEZ on February 27, 2023 09:50 CST       Addendum:    2/27/2023     After reviewing MRI results again today, it was noticed the impression does not match what was stated in the body of the report. I will reach out to radiology to clarify.       EMR Dragon/Transciption Disclaimer: Some of this note may be an electronic transcription/translation of spoken language to printed text.  The electronic translation of spoken language may permit erroneous, or at times, nonsensical words or phrases to be inadvertently transcribed. Although I have reviewed the note for such errors, some may still exist.       This document has been electronically signed by Gayatri GOMEZ on February 27, 2023 09:52 CST

## 2022-11-08 ENCOUNTER — HOSPITAL ENCOUNTER (OUTPATIENT)
Dept: PHYSICAL THERAPY | Facility: HOSPITAL | Age: 47
Setting detail: THERAPIES SERIES
Discharge: HOME | End: 2022-11-08

## 2022-11-08 DIAGNOSIS — G89.29 CHRONIC PAIN OF RIGHT KNEE: Primary | ICD-10-CM

## 2022-11-08 DIAGNOSIS — M25.561 CHRONIC PAIN OF RIGHT KNEE: Primary | ICD-10-CM

## 2022-11-08 DIAGNOSIS — G89.29 OTHER CHRONIC PAIN: ICD-10-CM

## 2022-11-08 DIAGNOSIS — G89.29 CHRONIC PAIN OF RIGHT KNEE: ICD-10-CM

## 2022-11-08 DIAGNOSIS — M25.561 CHRONIC PAIN OF RIGHT KNEE: ICD-10-CM

## 2022-11-08 PROCEDURE — 97110 THERAPEUTIC EXERCISES: CPT

## 2022-11-08 RX ORDER — HYDROCODONE BITARTRATE AND ACETAMINOPHEN 7.5; 325 MG/1; MG/1
1 TABLET ORAL 2 TIMES DAILY PRN
Qty: 60 TABLET | Refills: 0 | Status: SHIPPED | OUTPATIENT
Start: 2022-11-08 | End: 2022-12-12 | Stop reason: SDUPTHER

## 2022-11-08 NOTE — TELEPHONE ENCOUNTER
Rx Refill Note  Requested Prescriptions     Pending Prescriptions Disp Refills   • HYDROcodone-acetaminophen (NORCO) 7.5-325 MG per tablet 60 tablet 0     Sig: Take 1 tablet by mouth 2 (Two) Times a Day As Needed for Moderate Pain.      Last office visit with prescribing clinician: 9/20/2022      Next office visit with prescribing clinician: 12/29/2022            Sia Villagomez MA  11/08/22, 11:34 CST
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
## 2022-11-10 ENCOUNTER — HOSPITAL ENCOUNTER (OUTPATIENT)
Dept: PHYSICAL THERAPY | Facility: HOSPITAL | Age: 47
Setting detail: THERAPIES SERIES
Discharge: HOME | End: 2022-11-10

## 2022-11-10 DIAGNOSIS — G35 MS (MULTIPLE SCLEROSIS): ICD-10-CM

## 2022-11-10 DIAGNOSIS — M25.561 CHRONIC PAIN OF RIGHT KNEE: Primary | ICD-10-CM

## 2022-11-10 DIAGNOSIS — G89.29 CHRONIC PAIN OF RIGHT KNEE: Primary | ICD-10-CM

## 2022-11-10 DIAGNOSIS — M23.91 INTERNAL DERANGEMENT OF RIGHT KNEE: ICD-10-CM

## 2022-11-10 DIAGNOSIS — R26.89 BALANCE PROBLEM: ICD-10-CM

## 2022-11-10 PROCEDURE — 97110 THERAPEUTIC EXERCISES: CPT | Performed by: PHYSICAL THERAPIST

## 2022-11-10 PROCEDURE — 97530 THERAPEUTIC ACTIVITIES: CPT | Performed by: PHYSICAL THERAPIST
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
## 2022-11-15 ENCOUNTER — APPOINTMENT (OUTPATIENT)
Dept: PHYSICAL THERAPY | Facility: HOSPITAL | Age: 47
End: 2022-11-15

## 2022-11-18 ENCOUNTER — APPOINTMENT (OUTPATIENT)
Dept: PHYSICAL THERAPY | Facility: HOSPITAL | Age: 47
End: 2022-11-18

## 2022-11-22 ENCOUNTER — APPOINTMENT (OUTPATIENT)
Dept: PHYSICAL THERAPY | Facility: HOSPITAL | Age: 47
End: 2022-11-22

## 2022-11-23 ENCOUNTER — INFUSION (OUTPATIENT)
Dept: ONCOLOGY | Facility: HOSPITAL | Age: 47
End: 2022-11-23

## 2022-11-23 VITALS
SYSTOLIC BLOOD PRESSURE: 132 MMHG | TEMPERATURE: 98.5 F | HEART RATE: 73 BPM | DIASTOLIC BLOOD PRESSURE: 70 MMHG | RESPIRATION RATE: 18 BRPM

## 2022-11-23 DIAGNOSIS — G35 MULTIPLE SCLEROSIS: Primary | ICD-10-CM

## 2022-11-23 PROCEDURE — A9270 NON-COVERED ITEM OR SERVICE: HCPCS | Performed by: NURSE PRACTITIONER

## 2022-11-23 PROCEDURE — 25010000002 METHYLPREDNISOLONE PER 125 MG: Performed by: NURSE PRACTITIONER

## 2022-11-23 PROCEDURE — 96375 TX/PRO/DX INJ NEW DRUG ADDON: CPT | Performed by: INTERNAL MEDICINE

## 2022-11-23 PROCEDURE — 25010000002 DIPHENHYDRAMINE PER 50 MG: Performed by: NURSE PRACTITIONER

## 2022-11-23 PROCEDURE — 63710000001 ACETAMINOPHEN 325 MG TABLET: Performed by: NURSE PRACTITIONER

## 2022-11-23 PROCEDURE — 96365 THER/PROPH/DIAG IV INF INIT: CPT | Performed by: INTERNAL MEDICINE

## 2022-11-23 PROCEDURE — 96366 THER/PROPH/DIAG IV INF ADDON: CPT | Performed by: INTERNAL MEDICINE

## 2022-11-23 PROCEDURE — 25010000002 OCRELIZUMAB 300 MG/10ML SOLUTION 300 MG VIAL: Performed by: NURSE PRACTITIONER

## 2022-11-23 RX ORDER — ACETAMINOPHEN 325 MG/1
650 TABLET ORAL ONCE
Status: COMPLETED | OUTPATIENT
Start: 2022-11-23 | End: 2022-11-23

## 2022-11-23 RX ORDER — LORAZEPAM 2 MG/ML
0.5 INJECTION INTRAMUSCULAR AS NEEDED
Status: CANCELLED
Start: 2023-05-10 | End: 2023-05-17

## 2022-11-23 RX ORDER — METHYLPREDNISOLONE SODIUM SUCCINATE 125 MG/2ML
100 INJECTION, POWDER, LYOPHILIZED, FOR SOLUTION INTRAMUSCULAR; INTRAVENOUS ONCE
Status: CANCELLED | OUTPATIENT
Start: 2023-05-10

## 2022-11-23 RX ORDER — DIPHENHYDRAMINE HCL 25 MG
25 CAPSULE ORAL AS NEEDED
Status: CANCELLED
Start: 2023-05-10

## 2022-11-23 RX ORDER — ACETAMINOPHEN 325 MG/1
650 TABLET ORAL EVERY 6 HOURS PRN
Status: CANCELLED | OUTPATIENT
Start: 2023-05-10

## 2022-11-23 RX ORDER — METHYLPREDNISOLONE SODIUM SUCCINATE 125 MG/2ML
100 INJECTION, POWDER, LYOPHILIZED, FOR SOLUTION INTRAMUSCULAR; INTRAVENOUS ONCE
Status: COMPLETED | OUTPATIENT
Start: 2022-11-23 | End: 2022-11-23

## 2022-11-23 RX ORDER — DIPHENHYDRAMINE HYDROCHLORIDE 50 MG/ML
25 INJECTION INTRAMUSCULAR; INTRAVENOUS ONCE
Status: CANCELLED | OUTPATIENT
Start: 2023-05-10

## 2022-11-23 RX ORDER — DIPHENHYDRAMINE HYDROCHLORIDE 50 MG/ML
25 INJECTION INTRAMUSCULAR; INTRAVENOUS ONCE
Status: COMPLETED | OUTPATIENT
Start: 2022-11-23 | End: 2022-11-23

## 2022-11-23 RX ORDER — ONDANSETRON 2 MG/ML
4 INJECTION INTRAMUSCULAR; INTRAVENOUS AS NEEDED
Status: CANCELLED
Start: 2023-05-10

## 2022-11-23 RX ORDER — ACETAMINOPHEN 325 MG/1
650 TABLET ORAL ONCE
Status: CANCELLED | OUTPATIENT
Start: 2023-05-10

## 2022-11-23 RX ORDER — SODIUM CHLORIDE 9 MG/ML
250 INJECTION, SOLUTION INTRAVENOUS ONCE
Status: COMPLETED | OUTPATIENT
Start: 2022-11-23 | End: 2022-11-23

## 2022-11-23 RX ORDER — SODIUM CHLORIDE 9 MG/ML
250 INJECTION, SOLUTION INTRAVENOUS ONCE
Status: CANCELLED | OUTPATIENT
Start: 2023-05-10

## 2022-11-23 RX ADMIN — METHYLPREDNISOLONE SODIUM SUCCINATE 100 MG: 125 INJECTION, POWDER, FOR SOLUTION INTRAMUSCULAR; INTRAVENOUS at 08:52

## 2022-11-23 RX ADMIN — ACETAMINOPHEN 650 MG: 325 TABLET, FILM COATED ORAL at 08:36

## 2022-11-23 RX ADMIN — OCRELIZUMAB 600 MG: 300 INJECTION INTRAVENOUS at 09:45

## 2022-11-23 RX ADMIN — DIPHENHYDRAMINE HYDROCHLORIDE 25 MG: 50 INJECTION, SOLUTION INTRAMUSCULAR; INTRAVENOUS at 08:37

## 2022-11-23 RX ADMIN — SODIUM CHLORIDE 250 ML: 9 INJECTION, SOLUTION INTRAVENOUS at 08:36

## 2022-12-12 DIAGNOSIS — G89.29 OTHER CHRONIC PAIN: ICD-10-CM

## 2022-12-12 DIAGNOSIS — G89.29 CHRONIC PAIN OF RIGHT KNEE: ICD-10-CM

## 2022-12-12 DIAGNOSIS — M25.561 CHRONIC PAIN OF RIGHT KNEE: ICD-10-CM

## 2022-12-12 RX ORDER — HYDROCODONE BITARTRATE AND ACETAMINOPHEN 7.5; 325 MG/1; MG/1
1 TABLET ORAL 2 TIMES DAILY PRN
Qty: 60 TABLET | Refills: 0 | Status: SHIPPED | OUTPATIENT
Start: 2022-12-12 | End: 2022-12-29 | Stop reason: SDUPTHER

## 2022-12-12 NOTE — TELEPHONE ENCOUNTER
Rx Refill Note  Requested Prescriptions     Pending Prescriptions Disp Refills   • HYDROcodone-acetaminophen (NORCO) 7.5-325 MG per tablet 60 tablet 0     Sig: Take 1 tablet by mouth 2 (Two) Times a Day As Needed for Moderate Pain.      Last office visit with prescribing clinician: 9/20/2022     Next office visit with prescribing clinician: 12/29/2022                         Sia Villagomez MA  12/12/22, 12:21 CST

## 2022-12-29 ENCOUNTER — OFFICE VISIT (OUTPATIENT)
Dept: FAMILY MEDICINE CLINIC | Facility: CLINIC | Age: 47
End: 2022-12-29
Payer: MEDICARE

## 2022-12-29 VITALS
WEIGHT: 246.3 LBS | HEIGHT: 66 IN | RESPIRATION RATE: 18 BRPM | OXYGEN SATURATION: 98 % | DIASTOLIC BLOOD PRESSURE: 68 MMHG | BODY MASS INDEX: 39.58 KG/M2 | SYSTOLIC BLOOD PRESSURE: 128 MMHG | HEART RATE: 72 BPM

## 2022-12-29 DIAGNOSIS — M25.561 CHRONIC PAIN OF RIGHT KNEE: ICD-10-CM

## 2022-12-29 DIAGNOSIS — G89.29 CHRONIC PAIN OF RIGHT KNEE: ICD-10-CM

## 2022-12-29 DIAGNOSIS — G89.29 OTHER CHRONIC PAIN: ICD-10-CM

## 2022-12-29 PROCEDURE — 99214 OFFICE O/P EST MOD 30 MIN: CPT | Performed by: STUDENT IN AN ORGANIZED HEALTH CARE EDUCATION/TRAINING PROGRAM

## 2022-12-29 RX ORDER — HYDROCODONE BITARTRATE AND ACETAMINOPHEN 7.5; 325 MG/1; MG/1
1 TABLET ORAL 2 TIMES DAILY PRN
Qty: 60 TABLET | Refills: 0 | Status: SHIPPED | OUTPATIENT
Start: 2022-12-29 | End: 2023-03-30 | Stop reason: SDUPTHER

## 2023-01-03 DIAGNOSIS — B37.0 THRUSH: ICD-10-CM
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
## 2023-01-30 DIAGNOSIS — B37.0 THRUSH: ICD-10-CM

## 2023-02-27 ENCOUNTER — OFFICE VISIT (OUTPATIENT)
Dept: ORTHOPEDIC SURGERY | Facility: CLINIC | Age: 48
End: 2023-02-27
Payer: MEDICARE

## 2023-02-27 VITALS — WEIGHT: 244 LBS | BODY MASS INDEX: 39.21 KG/M2 | HEIGHT: 66 IN

## 2023-02-27 DIAGNOSIS — M25.561 CHRONIC PAIN OF RIGHT KNEE: Primary | ICD-10-CM

## 2023-02-27 DIAGNOSIS — G89.29 CHRONIC PAIN OF RIGHT KNEE: Primary | ICD-10-CM

## 2023-02-27 DIAGNOSIS — M23.91 INTERNAL DERANGEMENT OF RIGHT KNEE: ICD-10-CM

## 2023-02-27 PROCEDURE — 99214 OFFICE O/P EST MOD 30 MIN: CPT | Performed by: NURSE PRACTITIONER

## 2023-02-27 PROCEDURE — 20610 DRAIN/INJ JOINT/BURSA W/O US: CPT | Performed by: NURSE PRACTITIONER

## 2023-02-27 RX ORDER — LIDOCAINE HYDROCHLORIDE 10 MG/ML
2 INJECTION, SOLUTION INFILTRATION; PERINEURAL
Status: COMPLETED | OUTPATIENT
Start: 2023-02-27 | End: 2023-02-27

## 2023-02-27 RX ORDER — TRIAMCINOLONE ACETONIDE 40 MG/ML
40 INJECTION, SUSPENSION INTRA-ARTICULAR; INTRAMUSCULAR
Status: COMPLETED | OUTPATIENT
Start: 2023-02-27 | End: 2023-02-27

## 2023-02-27 RX ADMIN — LIDOCAINE HYDROCHLORIDE 2 ML: 10 INJECTION, SOLUTION INFILTRATION; PERINEURAL at 09:48

## 2023-02-27 RX ADMIN — TRIAMCINOLONE ACETONIDE 40 MG: 40 INJECTION, SUSPENSION INTRA-ARTICULAR; INTRAMUSCULAR at 09:48

## 2023-02-27 NOTE — PROGRESS NOTES
"Jenn Good is a 47 y.o. female returns for     Chief Complaint   Patient presents with   • Right Knee - Follow-up       HISTORY OF PRESENT ILLNESS:       Mrs. Good is a 47-year-old female who presents today to follow-up on right knee pain.  The pain has been present for at least the past year.  She has tried Norco, baclofen, diclofenac, physical therapy.  She reports some decrease in symptoms after starting physical therapy, however she reports progress has plateaued.  She continues to have occasional locking, popping.  She does have pain with squatting and pivoting.  At last appointment pain was mostly in her lateral knee.  Today pain is more medial.  But she reports at any given time pain can be in either lateral or medial knee.      CONCURRENT MEDICAL HISTORY:    The following portions of the patient's history were reviewed and updated as appropriate: allergies, current medications, past family history, past medical history, past social history, past surgical history and problem list.     ROS  No fevers or chills.  No chest pain or shortness of air.  No GI or  disturbances.  Right knee pain.    PHYSICAL EXAMINATION:       Ht 167.6 cm (66\")   Wt 111 kg (244 lb)   LMP  (LMP Unknown)   BMI 39.38 kg/m²     Physical Exam  Vitals and nursing note reviewed.   Constitutional:       General: She is not in acute distress.     Appearance: She is well-developed. She is not toxic-appearing.   HENT:      Head: Normocephalic.   Pulmonary:      Effort: Pulmonary effort is normal. No respiratory distress.   Musculoskeletal:      Right knee: No effusion.      Instability Tests: Medial Nevaeh test positive. Lateral Nevaeh test negative.   Skin:     General: Skin is warm and dry.   Neurological:      Mental Status: She is alert and oriented to person, place, and time.   Psychiatric:         Behavior: Behavior normal.         Thought Content: Thought content normal.         Judgment: Judgment normal.         GAIT:  "    []  Normal  []  Antalgic    Assistive device: []  None  []  Walker     []  Crutches  []  Cane     []  Wheelchair  []  Stretcher    Right Knee Exam     Tenderness   The patient is experiencing tenderness in the lateral joint line and medial joint line.    Range of Motion   Extension: 0   Flexion: 130     Tests   Nevaeh:  Medial - positive Lateral - negative  Varus: negative Valgus: negative    Other   Erythema: absent  Sensation: normal  Pulse: present  Swelling: mild  Effusion: no effusion present    Comments:  No evidence of infection.  No significant pain with arc of motion today  Mild pain with medial Nevaeh testing.              No results found.          ASSESSMENT:    Diagnoses and all orders for this visit:    Chronic pain of right knee    Internal derangement of right knee    Other orders  -     Large Joint Arthrocentesis: R knee        Large Joint Arthrocentesis: R knee  Date/Time: 2/27/2023 9:48 AM  Consent given by: patient  Site marked: site marked  Timeout: Immediately prior to procedure a time out was called to verify the correct patient, procedure, equipment, support staff and site/side marked as required   Supporting Documentation  Indications: pain   Procedure Details  Location: knee - R knee  Preparation: Patient was prepped and draped in the usual sterile fashion  Needle size: 22 G  Approach: anteromedial  Medications administered: 40 mg triamcinolone acetonide 40 MG/ML; 2 mL lidocaine 1 %  Patient tolerance: patient tolerated the procedure well with no immediate complications          PLAN      At last appointment pain was more lateral.  Today pain is more medial.  Patient has tried many conservative measures including Norco, baclofen, diclofenac, physical therapy with some relief but no significant relief of symptoms.  After reviewing her MRI results again today, it was noted that MRI does not make it clear whether tears in the medial or lateral meniscus.  Patient has had pain in both of  these areas.  Pain today is more medial.  Patient does continue to have some mechanical symptoms.  Radiology department made aware of MRI results, they are going to read patient's MRI again and make an addendum.  After discussing available treatment options and surgical consult, patient desires to proceed with intra-articular injection today though if intra-articular injection does not provide her significant relief she will likely return to see a surgeon to discuss possible surgical options.  Risk, benefits, alternatives to intra-articular injection explained.  Patient verbalized understanding.  Patient tolerated injection well.    Recommend the following:    -Rest and activity modification as tolerated and based on pain.  -Modified weightbearing of the affected extremity with use of a cane or walker as needed.  -Gradual progression of weightbearing and activity as pain and swelling allow.  -Conditioning and strengthening exercises of the bilateral knees/legs.  -Elevation and ice therapy to the affected knee to minimize pain/swelling/inflammation.             EMR Dragon/Transciption Disclaimer: Some of this note may be an electronic transcription/translation of spoken language to printed text.  The electronic translation of spoken language may permit erroneous, or at times, nonsensical words or phrases to be inadvertently transcribed. Although I have reviewed the note for such errors, some may still exist.       This document has been electronically signed by Gayatri GOMEZ on February 27, 2023 10:07 CST      Return in about 4 weeks (around 3/27/2023), or if symptoms worsen or fail to improve.

## 2023-03-30 ENCOUNTER — OFFICE VISIT (OUTPATIENT)
Dept: FAMILY MEDICINE CLINIC | Facility: CLINIC | Age: 48
End: 2023-03-30
Payer: MEDICARE

## 2023-03-30 VITALS
TEMPERATURE: 96.9 F | HEIGHT: 66 IN | HEART RATE: 88 BPM | BODY MASS INDEX: 39.05 KG/M2 | OXYGEN SATURATION: 99 % | SYSTOLIC BLOOD PRESSURE: 120 MMHG | DIASTOLIC BLOOD PRESSURE: 72 MMHG | WEIGHT: 243 LBS

## 2023-03-30 DIAGNOSIS — Z13.220 NEED FOR LIPID SCREENING: ICD-10-CM

## 2023-03-30 DIAGNOSIS — M25.561 CHRONIC PAIN OF RIGHT KNEE: Primary | ICD-10-CM

## 2023-03-30 DIAGNOSIS — G89.29 CHRONIC PAIN OF RIGHT KNEE: Primary | ICD-10-CM

## 2023-03-30 DIAGNOSIS — G89.29 OTHER CHRONIC PAIN: ICD-10-CM

## 2023-03-30 DIAGNOSIS — R73.03 PREDIABETES: ICD-10-CM

## 2023-03-30 DIAGNOSIS — K21.9 GASTROESOPHAGEAL REFLUX DISEASE, UNSPECIFIED WHETHER ESOPHAGITIS PRESENT: ICD-10-CM

## 2023-03-30 DIAGNOSIS — I10 ESSENTIAL HYPERTENSION: ICD-10-CM

## 2023-03-30 PROCEDURE — 3074F SYST BP LT 130 MM HG: CPT | Performed by: STUDENT IN AN ORGANIZED HEALTH CARE EDUCATION/TRAINING PROGRAM

## 2023-03-30 PROCEDURE — 1160F RVW MEDS BY RX/DR IN RCRD: CPT | Performed by: STUDENT IN AN ORGANIZED HEALTH CARE EDUCATION/TRAINING PROGRAM

## 2023-03-30 PROCEDURE — 99214 OFFICE O/P EST MOD 30 MIN: CPT | Performed by: STUDENT IN AN ORGANIZED HEALTH CARE EDUCATION/TRAINING PROGRAM

## 2023-03-30 PROCEDURE — 1159F MED LIST DOCD IN RCRD: CPT | Performed by: STUDENT IN AN ORGANIZED HEALTH CARE EDUCATION/TRAINING PROGRAM

## 2023-03-30 PROCEDURE — 3078F DIAST BP <80 MM HG: CPT | Performed by: STUDENT IN AN ORGANIZED HEALTH CARE EDUCATION/TRAINING PROGRAM

## 2023-03-30 RX ORDER — HYDROCODONE BITARTRATE AND ACETAMINOPHEN 7.5; 325 MG/1; MG/1
1 TABLET ORAL 2 TIMES DAILY PRN
Qty: 60 TABLET | Refills: 0 | Status: SHIPPED | OUTPATIENT
Start: 2023-03-30

## 2023-03-30 RX ORDER — OMEPRAZOLE 40 MG/1
40 CAPSULE, DELAYED RELEASE ORAL DAILY
Qty: 90 CAPSULE | Refills: 1 | Status: SHIPPED | OUTPATIENT
Start: 2023-03-30

## 2023-03-30 RX ORDER — DILTIAZEM HYDROCHLORIDE 240 MG/1
240 CAPSULE, COATED, EXTENDED RELEASE ORAL DAILY
Qty: 90 CAPSULE | Refills: 3 | Status: SHIPPED | OUTPATIENT
Start: 2023-03-30
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
## 2023-04-03 ENCOUNTER — LAB (OUTPATIENT)
Dept: LAB | Facility: HOSPITAL | Age: 48
End: 2023-04-03
Payer: MEDICARE

## 2023-04-03 LAB
ALBUMIN SERPL-MCNC: 3.7 G/DL (ref 3.5–5.2)
ALBUMIN/GLOB SERPL: 1.4 G/DL
ALP SERPL-CCNC: 99 U/L (ref 39–117)
ALT SERPL W P-5'-P-CCNC: 10 U/L (ref 1–33)
ANION GAP SERPL CALCULATED.3IONS-SCNC: 8 MMOL/L (ref 5–15)
AST SERPL-CCNC: 15 U/L (ref 1–32)
BASOPHILS # BLD AUTO: 0.03 10*3/MM3 (ref 0–0.2)
BASOPHILS NFR BLD AUTO: 0.5 % (ref 0–1.5)
BILIRUB SERPL-MCNC: <0.2 MG/DL (ref 0–1.2)
BUN SERPL-MCNC: 22 MG/DL (ref 6–20)
BUN/CREAT SERPL: 17.6 (ref 7–25)
CALCIUM SPEC-SCNC: 8.8 MG/DL (ref 8.6–10.5)
CHLORIDE SERPL-SCNC: 109 MMOL/L (ref 98–107)
CHOLEST SERPL-MCNC: 233 MG/DL (ref 0–200)
CO2 SERPL-SCNC: 25 MMOL/L (ref 22–29)
CREAT SERPL-MCNC: 1.25 MG/DL (ref 0.57–1)
DEPRECATED RDW RBC AUTO: 42.3 FL (ref 37–54)
EGFRCR SERPLBLD CKD-EPI 2021: 54.3 ML/MIN/1.73
EOSINOPHIL # BLD AUTO: 0.04 10*3/MM3 (ref 0–0.4)
EOSINOPHIL NFR BLD AUTO: 0.7 % (ref 0.3–6.2)
ERYTHROCYTE [DISTWIDTH] IN BLOOD BY AUTOMATED COUNT: 14.8 % (ref 12.3–15.4)
GLOBULIN UR ELPH-MCNC: 2.7 GM/DL
GLUCOSE SERPL-MCNC: 87 MG/DL (ref 65–99)
HBA1C MFR BLD: 5.7 % (ref 4.8–5.6)
HCT VFR BLD AUTO: 35.5 % (ref 34–46.6)
HDLC SERPL-MCNC: 61 MG/DL (ref 40–60)
HGB BLD-MCNC: 11.1 G/DL (ref 12–15.9)
IMM GRANULOCYTES # BLD AUTO: 0.02 10*3/MM3 (ref 0–0.05)
IMM GRANULOCYTES NFR BLD AUTO: 0.3 % (ref 0–0.5)
LDLC SERPL CALC-MCNC: 161 MG/DL (ref 0–100)
LDLC/HDLC SERPL: 2.61 {RATIO}
LYMPHOCYTES # BLD AUTO: 0.85 10*3/MM3 (ref 0.7–3.1)
LYMPHOCYTES NFR BLD AUTO: 14.4 % (ref 19.6–45.3)
MCH RBC QN AUTO: 25 PG (ref 26.6–33)
MCHC RBC AUTO-ENTMCNC: 31.3 G/DL (ref 31.5–35.7)
MCV RBC AUTO: 80 FL (ref 79–97)
MONOCYTES # BLD AUTO: 0.4 10*3/MM3 (ref 0.1–0.9)
MONOCYTES NFR BLD AUTO: 6.8 % (ref 5–12)
NEUTROPHILS NFR BLD AUTO: 4.57 10*3/MM3 (ref 1.7–7)
NEUTROPHILS NFR BLD AUTO: 77.3 % (ref 42.7–76)
NRBC BLD AUTO-RTO: 0 /100 WBC (ref 0–0.2)
PLATELET # BLD AUTO: 356 10*3/MM3 (ref 140–450)
PMV BLD AUTO: 10.4 FL (ref 6–12)
POTASSIUM SERPL-SCNC: 4 MMOL/L (ref 3.5–5.2)
PROT SERPL-MCNC: 6.4 G/DL (ref 6–8.5)
RBC # BLD AUTO: 4.44 10*6/MM3 (ref 3.77–5.28)
SODIUM SERPL-SCNC: 142 MMOL/L (ref 136–145)
TRIGL SERPL-MCNC: 65 MG/DL (ref 0–150)
VLDLC SERPL-MCNC: 11 MG/DL (ref 5–40)
WBC NRBC COR # BLD: 5.91 10*3/MM3 (ref 3.4–10.8)

## 2023-04-03 PROCEDURE — 85025 COMPLETE CBC W/AUTO DIFF WBC: CPT | Performed by: STUDENT IN AN ORGANIZED HEALTH CARE EDUCATION/TRAINING PROGRAM

## 2023-04-03 PROCEDURE — 83036 HEMOGLOBIN GLYCOSYLATED A1C: CPT | Performed by: STUDENT IN AN ORGANIZED HEALTH CARE EDUCATION/TRAINING PROGRAM

## 2023-04-03 PROCEDURE — 80061 LIPID PANEL: CPT | Performed by: STUDENT IN AN ORGANIZED HEALTH CARE EDUCATION/TRAINING PROGRAM

## 2023-04-03 PROCEDURE — 80053 COMPREHEN METABOLIC PANEL: CPT | Performed by: STUDENT IN AN ORGANIZED HEALTH CARE EDUCATION/TRAINING PROGRAM

## 2023-04-04 ENCOUNTER — TRANSCRIBE ORDERS (OUTPATIENT)
Dept: PHYSICAL THERAPY | Facility: HOSPITAL | Age: 48
End: 2023-04-04
Payer: MEDICARE

## 2023-04-04 ENCOUNTER — HOSPITAL ENCOUNTER (OUTPATIENT)
Dept: PHYSICAL THERAPY | Facility: HOSPITAL | Age: 48
Setting detail: THERAPIES SERIES
Discharge: HOME | End: 2023-04-04
Payer: MEDICARE

## 2023-04-04 DIAGNOSIS — G35 MS (MULTIPLE SCLEROSIS): Primary | ICD-10-CM

## 2023-04-04 DIAGNOSIS — R26.89 BALANCE PROBLEM: ICD-10-CM

## 2023-04-04 DIAGNOSIS — G35 MULTIPLE SCLEROSIS: Primary | ICD-10-CM

## 2023-04-04 DIAGNOSIS — Z91.81 AT RISK FOR FALLS: ICD-10-CM

## 2023-04-04 PROCEDURE — 97162 PT EVAL MOD COMPLEX 30 MIN: CPT | Performed by: PHYSICAL THERAPIST
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
## 2023-04-04 NOTE — PROGRESS NOTES
A1c 5.7%, improved from previous.  Kidney function at baseline, cholesterol remains elevated, anemia slightly improved from previous.  Overall, labs are reassuring, no medication changes needed, we will continue to monitor.

## 2023-04-07 ENCOUNTER — HOSPITAL ENCOUNTER (OUTPATIENT)
Dept: PHYSICAL THERAPY | Facility: HOSPITAL | Age: 48
Setting detail: THERAPIES SERIES
Discharge: HOME | End: 2023-04-07
Payer: MEDICARE

## 2023-04-07 DIAGNOSIS — R26.89 BALANCE PROBLEM: ICD-10-CM

## 2023-04-07 DIAGNOSIS — Z91.81 AT RISK FOR FALLS: ICD-10-CM

## 2023-04-07 DIAGNOSIS — G35 MS (MULTIPLE SCLEROSIS): Primary | ICD-10-CM

## 2023-04-07 PROCEDURE — 97110 THERAPEUTIC EXERCISES: CPT
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
## 2023-04-11 ENCOUNTER — HOSPITAL ENCOUNTER (OUTPATIENT)
Dept: PHYSICAL THERAPY | Facility: HOSPITAL | Age: 48
Setting detail: THERAPIES SERIES
Discharge: HOME | End: 2023-04-11
Payer: MEDICARE

## 2023-04-11 DIAGNOSIS — G35 MS (MULTIPLE SCLEROSIS): Primary | ICD-10-CM

## 2023-04-11 DIAGNOSIS — Z91.81 AT RISK FOR FALLS: ICD-10-CM

## 2023-04-11 DIAGNOSIS — R26.89 BALANCE PROBLEM: ICD-10-CM

## 2023-04-11 PROCEDURE — 97110 THERAPEUTIC EXERCISES: CPT

## 2023-04-11 PROCEDURE — 97530 THERAPEUTIC ACTIVITIES: CPT
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
## 2023-04-13 ENCOUNTER — HOSPITAL ENCOUNTER (OUTPATIENT)
Dept: PHYSICAL THERAPY | Facility: HOSPITAL | Age: 48
Setting detail: THERAPIES SERIES
Discharge: HOME | End: 2023-04-13
Payer: MEDICARE

## 2023-04-13 DIAGNOSIS — Z91.81 AT RISK FOR FALLS: ICD-10-CM

## 2023-04-13 DIAGNOSIS — R26.89 BALANCE PROBLEM: ICD-10-CM

## 2023-04-13 DIAGNOSIS — G35 MS (MULTIPLE SCLEROSIS): Primary | ICD-10-CM

## 2023-04-13 PROCEDURE — 97110 THERAPEUTIC EXERCISES: CPT
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
## 2023-04-17 ENCOUNTER — APPOINTMENT (OUTPATIENT)
Dept: PHYSICAL THERAPY | Facility: HOSPITAL | Age: 48
End: 2023-04-17
Payer: MEDICARE

## 2023-04-18 ENCOUNTER — HOSPITAL ENCOUNTER (OUTPATIENT)
Dept: PHYSICAL THERAPY | Facility: HOSPITAL | Age: 48
Setting detail: THERAPIES SERIES
Discharge: HOME | End: 2023-04-18
Payer: MEDICARE

## 2023-04-18 DIAGNOSIS — G35 MS (MULTIPLE SCLEROSIS): Primary | ICD-10-CM

## 2023-04-18 DIAGNOSIS — R26.89 BALANCE PROBLEM: ICD-10-CM

## 2023-04-18 DIAGNOSIS — Z91.81 AT RISK FOR FALLS: ICD-10-CM

## 2023-04-18 PROCEDURE — 97110 THERAPEUTIC EXERCISES: CPT | Performed by: PHYSICAL THERAPIST

## 2023-04-18 PROCEDURE — 97530 THERAPEUTIC ACTIVITIES: CPT | Performed by: PHYSICAL THERAPIST
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
## 2023-04-21 ENCOUNTER — TELEPHONE (OUTPATIENT)
Dept: PHYSICAL THERAPY | Facility: HOSPITAL | Age: 48
End: 2023-04-21
Payer: MEDICARE

## 2023-04-21 NOTE — TELEPHONE ENCOUNTER
Called secondary to no show for appointment. Attempted to call patient but number is not connecting when dialed.

## 2023-04-26 ENCOUNTER — HOSPITAL ENCOUNTER (OUTPATIENT)
Dept: PHYSICAL THERAPY | Facility: HOSPITAL | Age: 48
Setting detail: THERAPIES SERIES
Discharge: HOME | End: 2023-04-26
Payer: MEDICARE

## 2023-04-26 DIAGNOSIS — R26.89 BALANCE PROBLEM: ICD-10-CM

## 2023-04-26 DIAGNOSIS — Z91.81 AT RISK FOR FALLS: ICD-10-CM

## 2023-04-26 DIAGNOSIS — G35 MS (MULTIPLE SCLEROSIS): Primary | ICD-10-CM

## 2023-04-26 PROCEDURE — 97110 THERAPEUTIC EXERCISES: CPT

## 2023-04-26 PROCEDURE — 97112 NEUROMUSCULAR REEDUCATION: CPT
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
## 2023-04-27 ENCOUNTER — HOSPITAL ENCOUNTER (OUTPATIENT)
Dept: PHYSICAL THERAPY | Facility: HOSPITAL | Age: 48
Setting detail: THERAPIES SERIES
Discharge: HOME | End: 2023-04-27
Payer: MEDICARE

## 2023-04-27 DIAGNOSIS — G35 MS (MULTIPLE SCLEROSIS): Primary | ICD-10-CM

## 2023-04-27 DIAGNOSIS — R26.89 BALANCE PROBLEM: ICD-10-CM

## 2023-04-27 DIAGNOSIS — B37.0 THRUSH: ICD-10-CM

## 2023-04-27 DIAGNOSIS — Z91.81 AT RISK FOR FALLS: ICD-10-CM

## 2023-04-27 PROCEDURE — 97110 THERAPEUTIC EXERCISES: CPT
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
## 2023-05-01 ENCOUNTER — APPOINTMENT (OUTPATIENT)
Dept: ONCOLOGY | Facility: HOSPITAL | Age: 48
End: 2023-05-01

## 2023-05-02 ENCOUNTER — TELEPHONE (OUTPATIENT)
Dept: PHYSICAL THERAPY | Facility: HOSPITAL | Age: 48
End: 2023-05-02
Payer: MEDICARE

## 2023-05-02 DIAGNOSIS — R26.89 BALANCE PROBLEM: ICD-10-CM

## 2023-05-02 DIAGNOSIS — G35 MS (MULTIPLE SCLEROSIS): Primary | ICD-10-CM

## 2023-05-02 DIAGNOSIS — Z91.81 AT RISK FOR FALLS: ICD-10-CM

## 2023-05-03 ENCOUNTER — HOSPITAL ENCOUNTER (OUTPATIENT)
Dept: PHYSICAL THERAPY | Facility: HOSPITAL | Age: 48
Setting detail: THERAPIES SERIES
Discharge: HOME | End: 2023-05-03
Payer: MEDICARE

## 2023-05-03 DIAGNOSIS — R26.89 BALANCE PROBLEM: ICD-10-CM

## 2023-05-03 DIAGNOSIS — G35 MS (MULTIPLE SCLEROSIS): Primary | ICD-10-CM

## 2023-05-03 DIAGNOSIS — Z91.81 AT RISK FOR FALLS: ICD-10-CM

## 2023-05-03 PROCEDURE — 97530 THERAPEUTIC ACTIVITIES: CPT | Performed by: PHYSICAL THERAPIST

## 2023-05-03 PROCEDURE — 97110 THERAPEUTIC EXERCISES: CPT | Performed by: PHYSICAL THERAPIST
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
## 2023-05-11 ENCOUNTER — APPOINTMENT (OUTPATIENT)
Dept: PHYSICAL THERAPY | Facility: HOSPITAL | Age: 48
End: 2023-05-11
Payer: MEDICARE

## 2023-05-12 ENCOUNTER — HOSPITAL ENCOUNTER (OUTPATIENT)
Dept: PHYSICAL THERAPY | Facility: HOSPITAL | Age: 48
Setting detail: THERAPIES SERIES
Discharge: HOME | End: 2023-05-12
Payer: MEDICARE

## 2023-05-12 DIAGNOSIS — G35 MS (MULTIPLE SCLEROSIS): Primary | ICD-10-CM

## 2023-05-12 DIAGNOSIS — R26.89 BALANCE PROBLEM: ICD-10-CM

## 2023-05-12 DIAGNOSIS — Z91.81 AT RISK FOR FALLS: ICD-10-CM

## 2023-05-12 PROCEDURE — 97110 THERAPEUTIC EXERCISES: CPT | Performed by: PHYSICAL THERAPIST
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
## 2023-05-23 DIAGNOSIS — G89.29 CHRONIC PAIN OF RIGHT KNEE: ICD-10-CM

## 2023-05-23 DIAGNOSIS — M25.561 CHRONIC PAIN OF RIGHT KNEE: ICD-10-CM

## 2023-05-23 DIAGNOSIS — G89.29 OTHER CHRONIC PAIN: ICD-10-CM

## 2023-05-23 RX ORDER — HYDROCODONE BITARTRATE AND ACETAMINOPHEN 7.5; 325 MG/1; MG/1
1 TABLET ORAL 2 TIMES DAILY PRN
Qty: 60 TABLET | Refills: 0 | Status: SHIPPED | OUTPATIENT
Start: 2023-05-23

## 2023-05-23 NOTE — CODE DOCUMENTATION
Pt infomred of orders stating that she needs to wait 1 hour for observation after Tysabri was infused. She stated that she has been getting this for 9 years and did not want to stay.   91.5

## 2023-06-05 ENCOUNTER — PRIOR AUTHORIZATION (OUTPATIENT)
Dept: FAMILY MEDICINE CLINIC | Facility: CLINIC | Age: 48
End: 2023-06-05
Payer: MEDICARE

## 2023-08-08 ENCOUNTER — OFFICE VISIT (OUTPATIENT)
Dept: ORTHOPEDIC SURGERY | Facility: CLINIC | Age: 48
End: 2023-08-08
Payer: MEDICARE

## 2023-08-08 VITALS — WEIGHT: 237.8 LBS | HEIGHT: 66 IN | BODY MASS INDEX: 38.22 KG/M2

## 2023-08-08 DIAGNOSIS — G89.29 CHRONIC PAIN OF RIGHT KNEE: Primary | ICD-10-CM

## 2023-08-08 DIAGNOSIS — M23.91 INTERNAL DERANGEMENT OF RIGHT KNEE: ICD-10-CM

## 2023-08-08 DIAGNOSIS — M25.561 CHRONIC PAIN OF RIGHT KNEE: Primary | ICD-10-CM

## 2023-08-08 DIAGNOSIS — M23.200 OLD TEAR OF LATERAL MENISCUS OF RIGHT KNEE, UNSPECIFIED TEAR TYPE: ICD-10-CM

## 2023-08-08 RX ORDER — TRIAMCINOLONE ACETONIDE 40 MG/ML
40 INJECTION, SUSPENSION INTRA-ARTICULAR; INTRAMUSCULAR
Status: COMPLETED | OUTPATIENT
Start: 2023-08-08 | End: 2023-08-08

## 2023-08-08 RX ORDER — LIDOCAINE HYDROCHLORIDE 10 MG/ML
2 INJECTION, SOLUTION INFILTRATION; PERINEURAL
Status: COMPLETED | OUTPATIENT
Start: 2023-08-08 | End: 2023-08-08

## 2023-08-08 RX ADMIN — LIDOCAINE HYDROCHLORIDE 2 ML: 10 INJECTION, SOLUTION INFILTRATION; PERINEURAL at 08:33

## 2023-08-08 RX ADMIN — TRIAMCINOLONE ACETONIDE 40 MG: 40 INJECTION, SUSPENSION INTRA-ARTICULAR; INTRAMUSCULAR at 08:33

## 2023-08-08 NOTE — PROGRESS NOTES
"Jenn Good is a 48 y.o. female returns for     Chief Complaint   Patient presents with    Right Knee - Follow-up       HISTORY OF PRESENT ILLNESS:    Mrs. Good is a 48-year-old female who presents today to follow-up on right knee pain.  Patient was seen last in February 2023.  At that time she had tried Norco, baclofen, diclofenac, and physical therapy.  She was given an intra-articular knee injection at last appointment which helped symptoms greatly.  She states she recently went on a cruise and more ill fitting shoes, since then she has had increased pain in her right knee along with some swelling.  No traumatic injury.  She continues to have occasional locking and popping and pain with squatting and pivoting.  Pain today is mostly in lateral knee.  She has had an MRI in the past which did show a lateral meniscus tear.       CONCURRENT MEDICAL HISTORY:    The following portions of the patient's history were reviewed and updated as appropriate: allergies, current medications, past family history, past medical history, past social history, past surgical history and problem list.     ROS  No fevers or chills.  No chest pain or shortness of air.  No GI or  disturbances.  Right knee pain.    PHYSICAL EXAMINATION:       Ht 167.6 cm (66\")   Wt 108 kg (237 lb 12.8 oz)   LMP  (LMP Unknown)   BMI 38.38 kg/mý     Physical Exam  Vitals and nursing note reviewed.   Constitutional:       General: She is not in acute distress.     Appearance: She is well-developed. She is not toxic-appearing.   HENT:      Head: Normocephalic.   Pulmonary:      Effort: Pulmonary effort is normal. No respiratory distress.   Musculoskeletal:      Right knee: No effusion.   Skin:     General: Skin is warm and dry.   Neurological:      Mental Status: She is alert and oriented to person, place, and time.   Psychiatric:         Behavior: Behavior normal.         Thought Content: Thought content normal.         Judgment: Judgment normal. "       GAIT:     []  Normal  []  Antalgic    Assistive device: []  None  []  Walker     []  Crutches  []  Cane     []  Wheelchair  []  Stretcher    Right Knee Exam     Tenderness   The patient is experiencing tenderness in the lateral joint line.    Range of Motion   Extension:  0   Flexion:  130     Other   Erythema: absent  Swelling: mild  Effusion: no effusion present    Comments:  No evidence of infection.  Mild pain with arc of motion.            Narrative & Impression   PROCEDURE: XR KNEE BILATERAL AP STANDING (accession [id number]),  XR KNEE 3 VW RIGHT (accession [id number])     VIEWS: 4     INDICATION: Pain     COMPARISON: None     FINDINGS:     - fracture: None    - alignment: Within normal limits    - misc: No joint effusion or significant soft tissue  abnormality right knee. Left knee appears grossly unremarkable     IMPRESSION:  No acute osseous abnormality.        Note:  if pain or symptoms persist beyond reasonable expectations     and follow-up imaging is anticipated,  cross sectional imaging   (CT and/or MRI) is suggested, as is deemed clinically   appropriate.             ASSESSMENT:    Diagnoses and all orders for this visit:    Chronic pain of right knee    Old tear of lateral meniscus of right knee, unspecified tear type    Internal derangement of right knee    Other orders  -     Large Joint Arthrocentesis: R knee          PLAN    Large Joint Arthrocentesis: R knee  Date/Time: 8/8/2023 8:33 AM  Consent given by: patient  Site marked: site marked  Timeout: Immediately prior to procedure a time out was called to verify the correct patient, procedure, equipment, support staff and site/side marked as required   Supporting Documentation  Indications: pain   Procedure Details  Location: knee - R knee  Preparation: Patient was prepped and draped in the usual sterile fashion  Needle size: 22 G  Approach: anteromedial  Medications administered: 40 mg triamcinolone acetonide 40 MG/ML; 2 mL lidocaine 1  %  Patient tolerance: patient tolerated the procedure well with no immediate complications       After discussing available treatment options including risk, benefits, alternatives, patient desires to proceed with intra-articular steroid injection today.  Signs and symptoms to report including when to seek care explained.  Patient verbalized understanding.  If patient symptoms fail to improve with conservative management she may eventually be interested in surgical management.  Patient instructed to return to see surgeon at any time she desires.      Recommend the following:    -Rest and activity modification as tolerated and based on pain.  -Modified weightbearing of the affected extremity with use of a cane or walker as needed.  -Gradual progression of weightbearing and activity as pain and swelling allow.  -Conditioning and strengthening exercises of the bilateral knees/legs.  -Elevation and ice therapy to the affected knee to minimize pain/swelling/inflammation.     EMR Dragon/Transciption Disclaimer: Some of this note may be an electronic transcription/translation of spoken language to printed text.  The electronic translation of spoken language may permit erroneous, or at times, nonsensical words or phrases to be inadvertently transcribed. Although I have reviewed the note for such errors, some may still exist.           This document has been electronically signed by Gayatri GOMEZ on August 8, 2023 09:14 CDT

## 2023-09-06 ENCOUNTER — OFFICE VISIT (OUTPATIENT)
Dept: FAMILY MEDICINE CLINIC | Facility: CLINIC | Age: 48
End: 2023-09-06
Payer: MEDICARE

## 2023-09-06 VITALS
HEIGHT: 66 IN | DIASTOLIC BLOOD PRESSURE: 82 MMHG | WEIGHT: 233.4 LBS | OXYGEN SATURATION: 100 % | HEART RATE: 94 BPM | TEMPERATURE: 97.3 F | BODY MASS INDEX: 37.51 KG/M2 | SYSTOLIC BLOOD PRESSURE: 126 MMHG

## 2023-09-06 DIAGNOSIS — Z00.00 MEDICARE ANNUAL WELLNESS VISIT, SUBSEQUENT: Primary | ICD-10-CM

## 2023-09-06 PROCEDURE — G0439 PPPS, SUBSEQ VISIT: HCPCS | Performed by: STUDENT IN AN ORGANIZED HEALTH CARE EDUCATION/TRAINING PROGRAM

## 2023-09-06 PROCEDURE — 3079F DIAST BP 80-89 MM HG: CPT | Performed by: STUDENT IN AN ORGANIZED HEALTH CARE EDUCATION/TRAINING PROGRAM

## 2023-09-06 PROCEDURE — 3074F SYST BP LT 130 MM HG: CPT | Performed by: STUDENT IN AN ORGANIZED HEALTH CARE EDUCATION/TRAINING PROGRAM
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
